# Patient Record
Sex: MALE | Race: BLACK OR AFRICAN AMERICAN | NOT HISPANIC OR LATINO | Employment: UNEMPLOYED | URBAN - METROPOLITAN AREA
[De-identification: names, ages, dates, MRNs, and addresses within clinical notes are randomized per-mention and may not be internally consistent; named-entity substitution may affect disease eponyms.]

---
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
## 2021-03-27 ENCOUNTER — IMMUNIZATION (OUTPATIENT)
Dept: PRIMARY CARE CLINIC | Facility: CLINIC | Age: 21
End: 2021-03-27
Payer: COMMERCIAL

## 2021-03-27 DIAGNOSIS — Z23 NEED FOR VACCINATION: Primary | ICD-10-CM

## 2021-03-27 PROCEDURE — 91300 COVID-19, MRNA, LNP-S, PF, 30 MCG/0.3 ML DOSE VACCINE: CPT | Mod: PBBFAC | Performed by: EMERGENCY MEDICINE

## 2021-04-17 ENCOUNTER — IMMUNIZATION (OUTPATIENT)
Dept: PRIMARY CARE CLINIC | Facility: CLINIC | Age: 21
End: 2021-04-17
Payer: COMMERCIAL

## 2021-04-17 DIAGNOSIS — Z23 NEED FOR VACCINATION: Primary | ICD-10-CM

## 2021-04-17 PROCEDURE — 91300 COVID-19, MRNA, LNP-S, PF, 30 MCG/0.3 ML DOSE VACCINE: CPT | Mod: S$GLB,,, | Performed by: FAMILY MEDICINE

## 2021-04-17 PROCEDURE — 91300 COVID-19, MRNA, LNP-S, PF, 30 MCG/0.3 ML DOSE VACCINE: ICD-10-PCS | Mod: S$GLB,,, | Performed by: FAMILY MEDICINE

## 2021-04-17 PROCEDURE — 0002A COVID-19, MRNA, LNP-S, PF, 30 MCG/0.3 ML DOSE VACCINE: CPT | Mod: CV19,S$GLB,, | Performed by: FAMILY MEDICINE

## 2021-04-17 PROCEDURE — 0002A COVID-19, MRNA, LNP-S, PF, 30 MCG/0.3 ML DOSE VACCINE: ICD-10-PCS | Mod: CV19,S$GLB,, | Performed by: FAMILY MEDICINE

## 2021-08-10 ENCOUNTER — OFFICE VISIT (OUTPATIENT)
Dept: OPTOMETRY | Facility: CLINIC | Age: 21
End: 2021-08-10
Payer: COMMERCIAL

## 2021-08-10 DIAGNOSIS — Z01.00 EXAMINATION OF EYES AND VISION: Primary | ICD-10-CM

## 2021-08-10 DIAGNOSIS — H52.222 REGULAR ASTIGMATISM OF LEFT EYE: ICD-10-CM

## 2021-08-10 DIAGNOSIS — Z13.5 SCREENING FOR EYE CONDITION: ICD-10-CM

## 2021-08-10 PROCEDURE — 99999 PR PBB SHADOW E&M-EST. PATIENT-LVL II: CPT | Mod: PBBFAC,,, | Performed by: OPTOMETRIST

## 2021-08-10 PROCEDURE — 1159F MED LIST DOCD IN RCRD: CPT | Mod: CPTII,S$GLB,, | Performed by: OPTOMETRIST

## 2021-08-10 PROCEDURE — 92015 DETERMINE REFRACTIVE STATE: CPT | Mod: S$GLB,,, | Performed by: OPTOMETRIST

## 2021-08-10 PROCEDURE — 92004 PR EYE EXAM, NEW PATIENT,COMPREHESV: ICD-10-PCS | Mod: S$GLB,,, | Performed by: OPTOMETRIST

## 2021-08-10 PROCEDURE — 99999 PR PBB SHADOW E&M-EST. PATIENT-LVL II: ICD-10-PCS | Mod: PBBFAC,,, | Performed by: OPTOMETRIST

## 2021-08-10 PROCEDURE — 92004 COMPRE OPH EXAM NEW PT 1/>: CPT | Mod: S$GLB,,, | Performed by: OPTOMETRIST

## 2021-08-10 PROCEDURE — 1159F PR MEDICATION LIST DOCUMENTED IN MEDICAL RECORD: ICD-10-PCS | Mod: CPTII,S$GLB,, | Performed by: OPTOMETRIST

## 2021-08-10 PROCEDURE — 92015 PR REFRACTION: ICD-10-PCS | Mod: S$GLB,,, | Performed by: OPTOMETRIST

## 2022-01-18 ENCOUNTER — PATIENT MESSAGE (OUTPATIENT)
Dept: ADMINISTRATIVE | Facility: HOSPITAL | Age: 22
End: 2022-01-18
Payer: COMMERCIAL

## 2022-05-31 ENCOUNTER — OFFICE VISIT (OUTPATIENT)
Dept: PRIMARY CARE CLINIC | Facility: CLINIC | Age: 22
End: 2022-05-31
Payer: COMMERCIAL

## 2022-05-31 VITALS
DIASTOLIC BLOOD PRESSURE: 70 MMHG | WEIGHT: 235.13 LBS | BODY MASS INDEX: 31.16 KG/M2 | TEMPERATURE: 98 F | HEART RATE: 74 BPM | SYSTOLIC BLOOD PRESSURE: 128 MMHG | RESPIRATION RATE: 18 BRPM | OXYGEN SATURATION: 99 % | HEIGHT: 73 IN

## 2022-05-31 DIAGNOSIS — M25.511 ACUTE PAIN OF RIGHT SHOULDER: Primary | ICD-10-CM

## 2022-05-31 PROCEDURE — 3078F DIAST BP <80 MM HG: CPT | Mod: CPTII,S$GLB,, | Performed by: NURSE PRACTITIONER

## 2022-05-31 PROCEDURE — 3078F PR MOST RECENT DIASTOLIC BLOOD PRESSURE < 80 MM HG: ICD-10-PCS | Mod: CPTII,S$GLB,, | Performed by: NURSE PRACTITIONER

## 2022-05-31 PROCEDURE — 1160F PR REVIEW ALL MEDS BY PRESCRIBER/CLIN PHARMACIST DOCUMENTED: ICD-10-PCS | Mod: CPTII,S$GLB,, | Performed by: NURSE PRACTITIONER

## 2022-05-31 PROCEDURE — 3008F PR BODY MASS INDEX (BMI) DOCUMENTED: ICD-10-PCS | Mod: CPTII,S$GLB,, | Performed by: NURSE PRACTITIONER

## 2022-05-31 PROCEDURE — 1160F RVW MEDS BY RX/DR IN RCRD: CPT | Mod: CPTII,S$GLB,, | Performed by: NURSE PRACTITIONER

## 2022-05-31 PROCEDURE — 1159F MED LIST DOCD IN RCRD: CPT | Mod: CPTII,S$GLB,, | Performed by: NURSE PRACTITIONER

## 2022-05-31 PROCEDURE — 3008F BODY MASS INDEX DOCD: CPT | Mod: CPTII,S$GLB,, | Performed by: NURSE PRACTITIONER

## 2022-05-31 PROCEDURE — 3074F SYST BP LT 130 MM HG: CPT | Mod: CPTII,S$GLB,, | Performed by: NURSE PRACTITIONER

## 2022-05-31 PROCEDURE — 99999 PR PBB SHADOW E&M-EST. PATIENT-LVL V: ICD-10-PCS | Mod: PBBFAC,,, | Performed by: NURSE PRACTITIONER

## 2022-05-31 PROCEDURE — 3074F PR MOST RECENT SYSTOLIC BLOOD PRESSURE < 130 MM HG: ICD-10-PCS | Mod: CPTII,S$GLB,, | Performed by: NURSE PRACTITIONER

## 2022-05-31 PROCEDURE — 99999 PR PBB SHADOW E&M-EST. PATIENT-LVL V: CPT | Mod: PBBFAC,,, | Performed by: NURSE PRACTITIONER

## 2022-05-31 PROCEDURE — 99213 OFFICE O/P EST LOW 20 MIN: CPT | Mod: S$GLB,,, | Performed by: NURSE PRACTITIONER

## 2022-05-31 PROCEDURE — 1159F PR MEDICATION LIST DOCUMENTED IN MEDICAL RECORD: ICD-10-PCS | Mod: CPTII,S$GLB,, | Performed by: NURSE PRACTITIONER

## 2022-05-31 PROCEDURE — 99213 PR OFFICE/OUTPT VISIT, EST, LEVL III, 20-29 MIN: ICD-10-PCS | Mod: S$GLB,,, | Performed by: NURSE PRACTITIONER

## 2022-05-31 NOTE — PROGRESS NOTES
"Disclaimer:  This note is prepared using voice recognition software and as such is likely to have errors and has not been proof read. Please contact me for questions.    Subjective:      Patient ID: Royal Martinez II is a 21 y.o. male.    Chief Complaint: Shoulder Pain    Right shoulder pain x 2 months ago after playing basketball. Arm got locked up with another player, pulled and felt like almost dislocated. Since then has similar sensation with certain movements. No pain at rest. No swelling, bruising, etc. Right hand dominant. NO past injuries of shoulder.      Review of Systems   Musculoskeletal: Positive for arthralgias (right shoulder).       Objective:   /70 (BP Location: Left arm, Patient Position: Sitting, BP Method: Large (Manual))   Pulse 74   Temp 98 °F (36.7 °C) (Tympanic)   Resp 18   Ht 6' 1" (1.854 m)   Wt 106.7 kg (235 lb 1.9 oz)   SpO2 99%   BMI 31.02 kg/m²   Physical Exam  Vitals reviewed.   Constitutional:       General: He is not in acute distress.     Appearance: Normal appearance. He is well-developed. He is not diaphoretic.   HENT:      Head: Normocephalic and atraumatic.      Right Ear: External ear normal.      Left Ear: External ear normal.      Nose: Nose normal.   Eyes:      General:         Right eye: No discharge.         Left eye: No discharge.      Conjunctiva/sclera: Conjunctivae normal.   Cardiovascular:      Rate and Rhythm: Normal rate.   Pulmonary:      Effort: Pulmonary effort is normal. No respiratory distress.   Musculoskeletal:         General: Tenderness (R lateral shoulder, no bony TTP or deformity, normal ROM but pain with abduction >90 degrees, no crepitus, no swelling, ecchymosis, or wounds; distal strength, sensation, and pulses are all normal) present. No swelling or deformity. Normal range of motion.      Cervical back: Normal range of motion and neck supple.   Skin:     General: Skin is warm and dry.      Findings: No rash.   Neurological:      General: No " focal deficit present.      Mental Status: He is alert and oriented to person, place, and time.   Psychiatric:         Behavior: Behavior normal.         Thought Content: Thought content normal.         Judgment: Judgment normal.       Assessment:     1. Acute pain of right shoulder       Plan:   Acute pain of right shoulder  -     Ambulatory referral/consult to Physical/Occupational Therapy; Future; Expected date: 06/07/2022  -     X-ray Shoulder 2 or More Views Right; Future; Expected date: 05/31/2022  -     Ambulatory referral/consult to Orthopedics; Future; Expected date: 06/07/2022    Rest, ice PRN, NSAIDs PRN, and ortho/physical therapy follow up. X-ray pending. Will review when results are available. Mr. Martinez verbalizes understanding of home care, follow up, and red flag symptoms that warrant immediate re-evaluation. He agrees to plan.      Follow up if symptoms worsen or fail to improve.    There are no Patient Instructions on file for this visit.

## 2022-07-11 ENCOUNTER — OFFICE VISIT (OUTPATIENT)
Dept: INTERNAL MEDICINE | Facility: CLINIC | Age: 22
End: 2022-07-11
Payer: COMMERCIAL

## 2022-07-11 ENCOUNTER — LAB VISIT (OUTPATIENT)
Dept: LAB | Facility: HOSPITAL | Age: 22
End: 2022-07-11
Attending: FAMILY MEDICINE
Payer: COMMERCIAL

## 2022-07-11 VITALS
WEIGHT: 234.38 LBS | BODY MASS INDEX: 30.08 KG/M2 | HEIGHT: 74 IN | DIASTOLIC BLOOD PRESSURE: 80 MMHG | OXYGEN SATURATION: 98 % | SYSTOLIC BLOOD PRESSURE: 148 MMHG | HEART RATE: 98 BPM | RESPIRATION RATE: 18 BRPM

## 2022-07-11 DIAGNOSIS — Z11.59 ENCOUNTER FOR HEPATITIS C SCREENING TEST FOR LOW RISK PATIENT: ICD-10-CM

## 2022-07-11 DIAGNOSIS — Z00.00 ROUTINE PHYSICAL EXAMINATION: Primary | ICD-10-CM

## 2022-07-11 DIAGNOSIS — Z11.4 ENCOUNTER FOR SCREENING FOR HIV: ICD-10-CM

## 2022-07-11 DIAGNOSIS — Z00.00 ROUTINE PHYSICAL EXAMINATION: ICD-10-CM

## 2022-07-11 LAB
ALBUMIN SERPL BCP-MCNC: 4.4 G/DL (ref 3.5–5.2)
ALP SERPL-CCNC: 73 U/L (ref 55–135)
ALT SERPL W/O P-5'-P-CCNC: 30 U/L (ref 10–44)
ANION GAP SERPL CALC-SCNC: 9 MMOL/L (ref 8–16)
AST SERPL-CCNC: 19 U/L (ref 10–40)
BILIRUB SERPL-MCNC: 0.4 MG/DL (ref 0.1–1)
BUN SERPL-MCNC: 19 MG/DL (ref 6–20)
CALCIUM SERPL-MCNC: 9.8 MG/DL (ref 8.7–10.5)
CHLORIDE SERPL-SCNC: 104 MMOL/L (ref 95–110)
CO2 SERPL-SCNC: 27 MMOL/L (ref 23–29)
CREAT SERPL-MCNC: 1.2 MG/DL (ref 0.5–1.4)
EST. GFR  (AFRICAN AMERICAN): >60 ML/MIN/1.73 M^2
EST. GFR  (NON AFRICAN AMERICAN): >60 ML/MIN/1.73 M^2
GLUCOSE SERPL-MCNC: 90 MG/DL (ref 70–110)
POTASSIUM SERPL-SCNC: 5 MMOL/L (ref 3.5–5.1)
PROT SERPL-MCNC: 7.5 G/DL (ref 6–8.4)
SODIUM SERPL-SCNC: 140 MMOL/L (ref 136–145)

## 2022-07-11 PROCEDURE — 3077F SYST BP >= 140 MM HG: CPT | Mod: CPTII,S$GLB,, | Performed by: FAMILY MEDICINE

## 2022-07-11 PROCEDURE — 1159F PR MEDICATION LIST DOCUMENTED IN MEDICAL RECORD: ICD-10-PCS | Mod: CPTII,S$GLB,, | Performed by: FAMILY MEDICINE

## 2022-07-11 PROCEDURE — 80053 COMPREHEN METABOLIC PANEL: CPT | Performed by: FAMILY MEDICINE

## 2022-07-11 PROCEDURE — 85025 COMPLETE CBC W/AUTO DIFF WBC: CPT | Performed by: FAMILY MEDICINE

## 2022-07-11 PROCEDURE — 99395 PR PREVENTIVE VISIT,EST,18-39: ICD-10-PCS | Mod: S$GLB,,, | Performed by: FAMILY MEDICINE

## 2022-07-11 PROCEDURE — 87389 HIV-1 AG W/HIV-1&-2 AB AG IA: CPT | Performed by: FAMILY MEDICINE

## 2022-07-11 PROCEDURE — 99395 PREV VISIT EST AGE 18-39: CPT | Mod: S$GLB,,, | Performed by: FAMILY MEDICINE

## 2022-07-11 PROCEDURE — 3077F PR MOST RECENT SYSTOLIC BLOOD PRESSURE >= 140 MM HG: ICD-10-PCS | Mod: CPTII,S$GLB,, | Performed by: FAMILY MEDICINE

## 2022-07-11 PROCEDURE — 3079F PR MOST RECENT DIASTOLIC BLOOD PRESSURE 80-89 MM HG: ICD-10-PCS | Mod: CPTII,S$GLB,, | Performed by: FAMILY MEDICINE

## 2022-07-11 PROCEDURE — 99999 PR PBB SHADOW E&M-EST. PATIENT-LVL III: ICD-10-PCS | Mod: PBBFAC,,, | Performed by: FAMILY MEDICINE

## 2022-07-11 PROCEDURE — 1159F MED LIST DOCD IN RCRD: CPT | Mod: CPTII,S$GLB,, | Performed by: FAMILY MEDICINE

## 2022-07-11 PROCEDURE — 3079F DIAST BP 80-89 MM HG: CPT | Mod: CPTII,S$GLB,, | Performed by: FAMILY MEDICINE

## 2022-07-11 PROCEDURE — 36415 COLL VENOUS BLD VENIPUNCTURE: CPT | Performed by: FAMILY MEDICINE

## 2022-07-11 PROCEDURE — 3008F BODY MASS INDEX DOCD: CPT | Mod: CPTII,S$GLB,, | Performed by: FAMILY MEDICINE

## 2022-07-11 PROCEDURE — 86803 HEPATITIS C AB TEST: CPT | Performed by: FAMILY MEDICINE

## 2022-07-11 PROCEDURE — 3008F PR BODY MASS INDEX (BMI) DOCUMENTED: ICD-10-PCS | Mod: CPTII,S$GLB,, | Performed by: FAMILY MEDICINE

## 2022-07-11 PROCEDURE — 99999 PR PBB SHADOW E&M-EST. PATIENT-LVL III: CPT | Mod: PBBFAC,,, | Performed by: FAMILY MEDICINE

## 2022-07-11 NOTE — PROGRESS NOTES
Royal Martinez   07/12/2022  [id number]    Lety Grande MD  Patient Care Team:  Lety Grande MD as PCP - General (Internal Medicine)  Deisi Hyde MA as Care Coordinator    Has the patient seen any provider outside of the network since the last visit ? (no). If yes, HIPPA forms completed and records requested.      Visit Type:a scheduled routine follow-up visit    Chief Complaint:  Chief Complaint   Patient presents with    Annual Exam       History of Present Illness:  HPI  Mr. Martinez presents today for his annual exam.   Reviewed medical, social, surgical and family history    Elevated BP today patient states this has never been the case.   Will continue healthy lifestyle repeat in 1week      Review of Systems   Constitutional: Negative for chills and fever.   HENT: Negative for congestion and tinnitus.    Eyes: Negative for blurred vision, pain and discharge.   Respiratory: Negative for cough and wheezing.    Cardiovascular: Negative for chest pain, palpitations, orthopnea and leg swelling.   Gastrointestinal: Negative for abdominal pain, blood in stool, constipation, diarrhea, heartburn, nausea and vomiting.   Genitourinary: Negative for dysuria, flank pain, frequency, hematuria and urgency.   Skin: Negative for itching and rash.   Neurological: Negative for dizziness, tingling and headaches.   Psychiatric/Behavioral: Negative for depression.         Screening Questionnaires:    In the last two weeks how often have you felt down, depressed, or hopeless ( no )    In the last two weeks how often have you had little interest or pleasure in doing  (no )    In the last two weeks how often have you been bothered by the following problems:  1. Feeling nervous, anxious, or on edge ( no )    2. Not being able to stop or control worrying ( no)    3. Worrying too much about different things ( no)    4. Trouble relaxing ( no )    5. Being so restless that it is hard to sit still  (no )    6. Becoming easily  annoyed or irritable (no)    7. Feeling afraid as if something awful might happen (no )    How often do you have a drink containing Alcohol? denied     Do you exercise  (yes ) very active    Do you take a baby Aspirin daily ( no)    Do you have an advance directive ( no ) The patient was given information regarding Living Will/Durable Power-of- if requested.     The following were reviewed: Active problem list, medication list, allergies, family history, social history, and Health Maintenance.     History:  Past Medical History:   Diagnosis Date    ALLERGIC RHINITIS     Allergic rhinitis 2020    Asthma, currently inactive     Childhood asthma 2020    Obesity     Overweight(278.02)     Patellar tendonitis of left knee      Past Surgical History:   Procedure Laterality Date    CIRCUMCISION       Family History   Problem Relation Age of Onset    Alcohol abuse Paternal Grandfather         now     Hypertension Father     Obesity Father     Hypertension Maternal Grandfather     Hypertension Mother     Breast cancer Mother 40    Diabetes Unknown         maternal side    Acne Maternal Aunt     Psoriasis Cousin     Diabetes Maternal Uncle     Depression Neg Hx     Suicidality Neg Hx     Eczema Neg Hx     Melanoma Neg Hx     Lupus Neg Hx      Social History     Socioeconomic History    Marital status: Single   Occupational History    Occupation: Student   Tobacco Use    Smoking status: Never Smoker    Smokeless tobacco: Never Used   Substance and Sexual Activity    Alcohol use: No    Drug use: Never    Sexual activity: Not Currently   Social History Narrative    Lives with parents and sib, no pets.    11th grade- [employer].                                  Patient Active Problem List   Diagnosis    Body mass index (BMI) greater than or equal to 95th percentile for age in pediatric patient    Acanthosis nigricans    Regular astigmatism of both eyes     Allergic rhinitis    Childhood asthma     Review of patient's allergies indicates:  No Known Allergies    Health Maintenance  Health Maintenance Topics with due status: Not Due       Topic Last Completion Date    Influenza Vaccine 11/23/2020     Health Maintenance Due   Topic Date Due    TETANUS VACCINE  07/12/2022       Medications:  Current Outpatient Medications on File Prior to Visit   Medication Sig Dispense Refill    cetirizine (ZYRTEC) 10 mg chewable tablet Take 10 mg by mouth once daily. (Patient not taking: No sig reported) 30 tablet 2     No current facility-administered medications on file prior to visit.       Medications have been reviewed and reconciled with patient at visit today.    Barriers to medications present (no )    Adverse reactions to current medications (no)    Over the counter medications reviewed (Yes) and if needed added to active Medication list.    Exam:  Vitals:    07/11/22 1337   BP: (!) 148/80   Pulse:    Resp:      Weight: 106.3 kg (234 lb 5.6 oz)   Body mass index is 30.09 kg/m².      Physical Exam  Vitals and nursing note reviewed.   Constitutional:       Appearance: He is not ill-appearing.   HENT:      Head: Normocephalic and atraumatic.      Right Ear: External ear normal.      Left Ear: External ear normal.   Eyes:      General: No scleral icterus.        Right eye: No discharge.         Left eye: No discharge.      Pupils: Pupils are equal, round, and reactive to light.   Neck:      Thyroid: No thyromegaly.   Cardiovascular:      Rate and Rhythm: Normal rate and regular rhythm.      Heart sounds: Normal heart sounds. No murmur heard.  Pulmonary:      Effort: Pulmonary effort is normal. No respiratory distress.      Breath sounds: Normal breath sounds. No wheezing.   Abdominal:      General: Bowel sounds are normal. There is no distension.      Palpations: Abdomen is soft.      Tenderness: There is no abdominal tenderness.   Musculoskeletal:         General: Normal  range of motion.      Cervical back: Normal range of motion and neck supple.   Skin:     General: Skin is warm.      Findings: No erythema or rash.   Neurological:      Mental Status: He is alert and oriented to person, place, and time.      Coordination: Coordination normal.      Deep Tendon Reflexes: Reflexes are normal and symmetric.   Psychiatric:         Behavior: Behavior normal.         Laboratory Reviewed: (Yes)  Lab Results   Component Value Date    WBC 7.33 07/11/2022    HGB 14.4 07/11/2022    HCT 45.6 07/11/2022     07/11/2022    CHOL 138 11/24/2020    TRIG 25 (L) 11/24/2020    HDL 67 11/24/2020    ALT 30 07/11/2022    AST 19 07/11/2022     07/11/2022    K 5.0 07/11/2022     07/11/2022    CREATININE 1.2 07/11/2022    BUN 19 07/11/2022    CO2 27 07/11/2022    TSH 1.992 11/24/2020    GLUF 91 10/26/2012    HGBA1C 5.2 09/19/2016       Assessment:  The primary encounter diagnosis was Routine physical examination. Diagnoses of Encounter for hepatitis C screening test for low risk patient and Encounter for screening for HIV were also pertinent to this visit.    Plan:  Routine physical examination  -     Comprehensive Metabolic Panel; Future; Expected date: 07/11/2022  -     CBC Auto Differential; Future; Expected date: 07/11/2022    Encounter for hepatitis C screening test for low risk patient  -     Hepatitis C Antibody; Future; Expected date: 07/11/2022    Encounter for screening for HIV  -     HIV 1/2 Ag/Ab (4th Gen); Future; Expected date: 07/11/2022      -Patient's lab results were reviewed and discussed with patient  -Treatment options and alternatives were discussed with the patient. Patient expressed understanding. Patient was given the opportunity to ask questions and be an active participant in their medical care. Patient had no further questions or concerns at this time.   -Documentation of patient's health and condition was obtained from family member who was present during  visit.  -Patient is an overall moderate risk for health complications from their medical conditions.     Follow up: follow up 1 week for BP check   1 year for annual       Care Plan/Goals: Reviewed No   Goals      30 Mins activity 4x/week       Healthy Plate at Meals                After visit summary printed and given to patient upon discharge.  Patient goals and care plan are included in After visit summary.

## 2022-07-12 LAB
BASOPHILS # BLD AUTO: 0.07 K/UL (ref 0–0.2)
BASOPHILS NFR BLD: 1 % (ref 0–1.9)
DIFFERENTIAL METHOD: ABNORMAL
EOSINOPHIL # BLD AUTO: 0.3 K/UL (ref 0–0.5)
EOSINOPHIL NFR BLD: 3.8 % (ref 0–8)
ERYTHROCYTE [DISTWIDTH] IN BLOOD BY AUTOMATED COUNT: 13.2 % (ref 11.5–14.5)
HCT VFR BLD AUTO: 45.6 % (ref 40–54)
HCV AB SERPL QL IA: NEGATIVE
HGB BLD-MCNC: 14.4 G/DL (ref 14–18)
HIV 1+2 AB+HIV1 P24 AG SERPL QL IA: NEGATIVE
IMM GRANULOCYTES # BLD AUTO: 0.01 K/UL (ref 0–0.04)
IMM GRANULOCYTES NFR BLD AUTO: 0.1 % (ref 0–0.5)
LYMPHOCYTES # BLD AUTO: 2 K/UL (ref 1–4.8)
LYMPHOCYTES NFR BLD: 27.1 % (ref 18–48)
MCH RBC QN AUTO: 30 PG (ref 27–31)
MCHC RBC AUTO-ENTMCNC: 31.6 G/DL (ref 32–36)
MCV RBC AUTO: 95 FL (ref 82–98)
MONOCYTES # BLD AUTO: 0.6 K/UL (ref 0.3–1)
MONOCYTES NFR BLD: 8.7 % (ref 4–15)
NEUTROPHILS # BLD AUTO: 4.3 K/UL (ref 1.8–7.7)
NEUTROPHILS NFR BLD: 59.3 % (ref 38–73)
NRBC BLD-RTO: 0 /100 WBC
PLATELET # BLD AUTO: 197 K/UL (ref 150–450)
PMV BLD AUTO: 11.5 FL (ref 9.2–12.9)
RBC # BLD AUTO: 4.8 M/UL (ref 4.6–6.2)
WBC # BLD AUTO: 7.33 K/UL (ref 3.9–12.7)

## 2022-07-18 ENCOUNTER — CLINICAL SUPPORT (OUTPATIENT)
Dept: INTERNAL MEDICINE | Facility: CLINIC | Age: 22
End: 2022-07-18
Payer: COMMERCIAL

## 2022-07-18 VITALS — DIASTOLIC BLOOD PRESSURE: 62 MMHG | SYSTOLIC BLOOD PRESSURE: 154 MMHG | HEART RATE: 106 BPM

## 2022-07-18 DIAGNOSIS — I10 HYPERTENSION, UNSPECIFIED TYPE: Primary | ICD-10-CM

## 2022-07-18 PROCEDURE — 99999 PR PBB SHADOW E&M-EST. PATIENT-LVL II: CPT | Mod: PBBFAC,,,

## 2022-07-18 PROCEDURE — 99999 PR PBB SHADOW E&M-EST. PATIENT-LVL II: ICD-10-PCS | Mod: PBBFAC,,,

## 2022-07-18 NOTE — PROGRESS NOTES
Pt here for bp check  bp 154/62 pulse 106  MD notified   Recommendation to send him to cardiology   appt made  And pt notified

## 2022-07-27 ENCOUNTER — OFFICE VISIT (OUTPATIENT)
Dept: CARDIOLOGY | Facility: CLINIC | Age: 22
End: 2022-07-27
Payer: COMMERCIAL

## 2022-07-27 ENCOUNTER — PATIENT MESSAGE (OUTPATIENT)
Dept: CARDIOLOGY | Facility: CLINIC | Age: 22
End: 2022-07-27

## 2022-07-27 VITALS
SYSTOLIC BLOOD PRESSURE: 138 MMHG | HEIGHT: 74 IN | DIASTOLIC BLOOD PRESSURE: 80 MMHG | HEART RATE: 92 BPM | BODY MASS INDEX: 30.21 KG/M2 | WEIGHT: 235.44 LBS

## 2022-07-27 DIAGNOSIS — R03.0 ELEVATED BLOOD PRESSURE READING WITHOUT DIAGNOSIS OF HYPERTENSION: ICD-10-CM

## 2022-07-27 PROCEDURE — 99203 PR OFFICE/OUTPT VISIT, NEW, LEVL III, 30-44 MIN: ICD-10-PCS | Mod: S$GLB,,, | Performed by: INTERNAL MEDICINE

## 2022-07-27 PROCEDURE — 1159F PR MEDICATION LIST DOCUMENTED IN MEDICAL RECORD: ICD-10-PCS | Mod: CPTII,S$GLB,, | Performed by: INTERNAL MEDICINE

## 2022-07-27 PROCEDURE — 3079F DIAST BP 80-89 MM HG: CPT | Mod: CPTII,S$GLB,, | Performed by: INTERNAL MEDICINE

## 2022-07-27 PROCEDURE — 3079F PR MOST RECENT DIASTOLIC BLOOD PRESSURE 80-89 MM HG: ICD-10-PCS | Mod: CPTII,S$GLB,, | Performed by: INTERNAL MEDICINE

## 2022-07-27 PROCEDURE — 99999 PR PBB SHADOW E&M-EST. PATIENT-LVL III: ICD-10-PCS | Mod: PBBFAC,,, | Performed by: INTERNAL MEDICINE

## 2022-07-27 PROCEDURE — 99999 PR PBB SHADOW E&M-EST. PATIENT-LVL III: CPT | Mod: PBBFAC,,, | Performed by: INTERNAL MEDICINE

## 2022-07-27 PROCEDURE — 3075F SYST BP GE 130 - 139MM HG: CPT | Mod: CPTII,S$GLB,, | Performed by: INTERNAL MEDICINE

## 2022-07-27 PROCEDURE — 3008F BODY MASS INDEX DOCD: CPT | Mod: CPTII,S$GLB,, | Performed by: INTERNAL MEDICINE

## 2022-07-27 PROCEDURE — 1159F MED LIST DOCD IN RCRD: CPT | Mod: CPTII,S$GLB,, | Performed by: INTERNAL MEDICINE

## 2022-07-27 PROCEDURE — 3075F PR MOST RECENT SYSTOLIC BLOOD PRESS GE 130-139MM HG: ICD-10-PCS | Mod: CPTII,S$GLB,, | Performed by: INTERNAL MEDICINE

## 2022-07-27 PROCEDURE — 1160F PR REVIEW ALL MEDS BY PRESCRIBER/CLIN PHARMACIST DOCUMENTED: ICD-10-PCS | Mod: CPTII,S$GLB,, | Performed by: INTERNAL MEDICINE

## 2022-07-27 PROCEDURE — 1160F RVW MEDS BY RX/DR IN RCRD: CPT | Mod: CPTII,S$GLB,, | Performed by: INTERNAL MEDICINE

## 2022-07-27 PROCEDURE — 99203 OFFICE O/P NEW LOW 30 MIN: CPT | Mod: S$GLB,,, | Performed by: INTERNAL MEDICINE

## 2022-07-27 PROCEDURE — 3008F PR BODY MASS INDEX (BMI) DOCUMENTED: ICD-10-PCS | Mod: CPTII,S$GLB,, | Performed by: INTERNAL MEDICINE

## 2022-07-27 NOTE — PROGRESS NOTES
Subjective:   Patient ID:  Royal Martinez II is a 22 y.o. male who presents for evaluation of elevated blood pressure without mention of hypertension      HPI: Very pleasant young man with no significant medical history or family history of cardiovascular disease presents because of a reading of 160mm Hg systolic found on a routine wellness visit at school.  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    He is doing well with no new symptoms or cardiovascular complaints and no change in exercise capacity.  He denies chest discomfort, VOGEL, palpitations, PND/orthopnea, lightheadedness and syncope.    He works out virtually every day and eats healthfully.  He's been keeping track of his BPs at home on an accurate cuff and every reading is under 125mm Hg systolic.    Past Medical History:   Diagnosis Date    ALLERGIC RHINITIS     Allergic rhinitis 2020    Asthma, currently inactive     Childhood asthma 2020    Obesity     Overweight(278.02)     Patellar tendonitis of left knee        Past Surgical History:   Procedure Laterality Date    CIRCUMCISION         Social History     Tobacco Use    Smoking status: Never Smoker    Smokeless tobacco: Never Used   Substance Use Topics    Alcohol use: No    Drug use: Never       Family History   Problem Relation Age of Onset    Alcohol abuse Paternal Grandfather         now     Hypertension Father     Obesity Father     Hypertension Maternal Grandfather     Hypertension Mother     Breast cancer Mother 40    Diabetes Unknown         maternal side    Acne Maternal Aunt     Psoriasis Cousin     Diabetes Maternal Uncle     Depression Neg Hx     Suicidality Neg Hx     Eczema Neg Hx     Melanoma Neg Hx     Lupus Neg Hx        No current outpatient medications on file.     No current facility-administered medications for this visit.       Review of patient's allergies indicates:  No  Known Allergies    Review of Systems   Constitutional: Negative.   HENT: Negative.    Eyes: Negative.    Cardiovascular: Negative.  Negative for chest pain, dyspnea on exertion, near-syncope, orthopnea and palpitations.   Respiratory: Negative.  Negative for cough and shortness of breath.    Endocrine: Negative.    Hematologic/Lymphatic: Negative.    Skin: Negative.    Musculoskeletal: Negative.    Gastrointestinal: Negative.    Genitourinary: Negative.    Neurological: Negative.    Psychiatric/Behavioral: Negative.      Objective:   Physical Exam  Vitals reviewed.   Constitutional:       Appearance: He is well-developed.   HENT:      Head: Normocephalic and atraumatic.   Eyes:      General: No scleral icterus.     Conjunctiva/sclera: Conjunctivae normal.   Neck:      Vascular: No JVD.   Cardiovascular:      Rate and Rhythm: Normal rate and regular rhythm.      Pulses: Intact distal pulses.      Heart sounds: Normal heart sounds. No murmur heard.    No friction rub. No gallop.   Pulmonary:      Effort: Pulmonary effort is normal.      Breath sounds: Normal breath sounds. No wheezing or rales.   Abdominal:      General: Bowel sounds are normal. There is no distension.      Palpations: Abdomen is soft.      Tenderness: There is no abdominal tenderness.   Musculoskeletal:         General: Normal range of motion.      Cervical back: Normal range of motion and neck supple.   Skin:     General: Skin is warm and dry.      Findings: No erythema or rash.   Neurological:      Mental Status: He is alert and oriented to person, place, and time.   Psychiatric:         Behavior: Behavior normal.         Thought Content: Thought content normal.         Judgment: Judgment normal.         Lab Results   Component Value Date    WBC 7.33 07/11/2022    HGB 14.4 07/11/2022    HCT 45.6 07/11/2022    MCV 95 07/11/2022     07/11/2022         Chemistry        Component Value Date/Time     07/11/2022 1350    K 5.0 07/11/2022 1350      07/11/2022 1350    CO2 27 07/11/2022 1350    BUN 19 07/11/2022 1350    CREATININE 1.2 07/11/2022 1350    GLU 90 07/11/2022 1350        Component Value Date/Time    CALCIUM 9.8 07/11/2022 1350    ALKPHOS 73 07/11/2022 1350    AST 19 07/11/2022 1350    ALT 30 07/11/2022 1350    BILITOT 0.4 07/11/2022 1350    ESTGFRAFRICA >60.0 07/11/2022 1350    EGFRNONAA >60.0 07/11/2022 1350            Lab Results   Component Value Date    CHOL 138 11/24/2020    CHOL 120 09/19/2016    CHOL 131 10/26/2012     Lab Results   Component Value Date    HDL 67 11/24/2020    HDL 40 09/19/2016    HDL 40 09/19/2016     Lab Results   Component Value Date    LDLCALC 66.0 11/24/2020    LDLCALC 65.6 09/19/2016    LDLCALC 69.0 10/26/2012     Lab Results   Component Value Date    TRIG 25 (L) 11/24/2020    TRIG 72 09/19/2016    TRIG 65 10/26/2012     Lab Results   Component Value Date    CHOLHDL 48.6 11/24/2020    CHOLHDL 33.3 09/19/2016    CHOLHDL 37.4 10/26/2012       Lab Results   Component Value Date    TSH 1.992 11/24/2020       Lab Results   Component Value Date    HGBA1C 5.2 09/19/2016         Assessment:     1. Elevated blood pressure reading without diagnosis of hypertension        Plan:     Reassurance    Continue current medicines.    Diet/exercise goals reinforced.      F/U PRN

## 2022-09-24 ENCOUNTER — IMMUNIZATION (OUTPATIENT)
Dept: INTERNAL MEDICINE | Facility: CLINIC | Age: 22
End: 2022-09-24
Payer: COMMERCIAL

## 2022-09-24 PROCEDURE — 90686 FLU VACCINE (QUAD) GREATER THAN OR EQUAL TO 3YO PRESERVATIVE FREE IM: ICD-10-PCS | Mod: S$GLB,,, | Performed by: FAMILY MEDICINE

## 2022-09-24 PROCEDURE — 90471 FLU VACCINE (QUAD) GREATER THAN OR EQUAL TO 3YO PRESERVATIVE FREE IM: ICD-10-PCS | Mod: S$GLB,,, | Performed by: FAMILY MEDICINE

## 2022-09-24 PROCEDURE — 90686 IIV4 VACC NO PRSV 0.5 ML IM: CPT | Mod: S$GLB,,, | Performed by: FAMILY MEDICINE

## 2022-09-24 PROCEDURE — 90471 IMMUNIZATION ADMIN: CPT | Mod: S$GLB,,, | Performed by: FAMILY MEDICINE

## 2022-11-22 ENCOUNTER — OFFICE VISIT (OUTPATIENT)
Dept: INTERNAL MEDICINE | Facility: CLINIC | Age: 22
End: 2022-11-22
Payer: COMMERCIAL

## 2022-11-22 VITALS
OXYGEN SATURATION: 100 % | HEIGHT: 74 IN | HEART RATE: 90 BPM | SYSTOLIC BLOOD PRESSURE: 138 MMHG | TEMPERATURE: 97 F | BODY MASS INDEX: 31.01 KG/M2 | DIASTOLIC BLOOD PRESSURE: 60 MMHG | WEIGHT: 241.63 LBS

## 2022-11-22 DIAGNOSIS — J06.9 VIRAL URI WITH COUGH: ICD-10-CM

## 2022-11-22 DIAGNOSIS — R05.9 COUGH, UNSPECIFIED TYPE: Primary | ICD-10-CM

## 2022-11-22 PROCEDURE — 99999 PR PBB SHADOW E&M-EST. PATIENT-LVL IV: CPT | Mod: PBBFAC,,, | Performed by: PHYSICIAN ASSISTANT

## 2022-11-22 PROCEDURE — 3078F PR MOST RECENT DIASTOLIC BLOOD PRESSURE < 80 MM HG: ICD-10-PCS | Mod: CPTII,S$GLB,, | Performed by: PHYSICIAN ASSISTANT

## 2022-11-22 PROCEDURE — 1160F RVW MEDS BY RX/DR IN RCRD: CPT | Mod: CPTII,S$GLB,, | Performed by: PHYSICIAN ASSISTANT

## 2022-11-22 PROCEDURE — 99214 PR OFFICE/OUTPT VISIT, EST, LEVL IV, 30-39 MIN: ICD-10-PCS | Mod: S$GLB,,, | Performed by: PHYSICIAN ASSISTANT

## 2022-11-22 PROCEDURE — 1160F PR REVIEW ALL MEDS BY PRESCRIBER/CLIN PHARMACIST DOCUMENTED: ICD-10-PCS | Mod: CPTII,S$GLB,, | Performed by: PHYSICIAN ASSISTANT

## 2022-11-22 PROCEDURE — 99214 OFFICE O/P EST MOD 30 MIN: CPT | Mod: S$GLB,,, | Performed by: PHYSICIAN ASSISTANT

## 2022-11-22 PROCEDURE — 1159F PR MEDICATION LIST DOCUMENTED IN MEDICAL RECORD: ICD-10-PCS | Mod: CPTII,S$GLB,, | Performed by: PHYSICIAN ASSISTANT

## 2022-11-22 PROCEDURE — 99999 PR PBB SHADOW E&M-EST. PATIENT-LVL IV: ICD-10-PCS | Mod: PBBFAC,,, | Performed by: PHYSICIAN ASSISTANT

## 2022-11-22 PROCEDURE — 3075F SYST BP GE 130 - 139MM HG: CPT | Mod: CPTII,S$GLB,, | Performed by: PHYSICIAN ASSISTANT

## 2022-11-22 PROCEDURE — 3075F PR MOST RECENT SYSTOLIC BLOOD PRESS GE 130-139MM HG: ICD-10-PCS | Mod: CPTII,S$GLB,, | Performed by: PHYSICIAN ASSISTANT

## 2022-11-22 PROCEDURE — 3078F DIAST BP <80 MM HG: CPT | Mod: CPTII,S$GLB,, | Performed by: PHYSICIAN ASSISTANT

## 2022-11-22 PROCEDURE — 1159F MED LIST DOCD IN RCRD: CPT | Mod: CPTII,S$GLB,, | Performed by: PHYSICIAN ASSISTANT

## 2022-11-22 PROCEDURE — 3008F BODY MASS INDEX DOCD: CPT | Mod: CPTII,S$GLB,, | Performed by: PHYSICIAN ASSISTANT

## 2022-11-22 PROCEDURE — 3008F PR BODY MASS INDEX (BMI) DOCUMENTED: ICD-10-PCS | Mod: CPTII,S$GLB,, | Performed by: PHYSICIAN ASSISTANT

## 2022-11-22 RX ORDER — PROMETHAZINE HYDROCHLORIDE AND DEXTROMETHORPHAN HYDROBROMIDE 6.25; 15 MG/5ML; MG/5ML
5 SYRUP ORAL EVERY 6 HOURS PRN
Qty: 118 ML | Refills: 0 | Status: SHIPPED | OUTPATIENT
Start: 2022-11-22 | End: 2022-11-29

## 2022-11-22 RX ORDER — BENZONATATE 200 MG/1
200 CAPSULE ORAL 3 TIMES DAILY PRN
Qty: 30 CAPSULE | Refills: 0 | Status: SHIPPED | OUTPATIENT
Start: 2022-11-22 | End: 2022-12-02

## 2022-11-22 NOTE — PROGRESS NOTES
Subjective:      Patient ID: Royal Martinez II is a 22 y.o. male.    Chief Complaint: Cough    Cough  This is a new problem. The current episode started today. The cough is Productive of sputum. Associated symptoms include nasal congestion, postnasal drip and rhinorrhea. Pertinent negatives include no chest pain, chills, ear congestion, ear pain, fever, headaches, heartburn, hemoptysis, myalgias, rash, sore throat, shortness of breath, sweats, weight loss or wheezing. He has tried nothing for the symptoms. His past medical history is significant for asthma. There is no history of bronchiectasis, bronchitis, COPD, emphysema, environmental allergies or pneumonia.     Patient Active Problem List   Diagnosis    Body mass index (BMI) greater than or equal to 95th percentile for age in pediatric patient    Acanthosis nigricans    Regular astigmatism of both eyes    Allergic rhinitis    Childhood asthma    Elevated blood pressure reading without diagnosis of hypertension         Current Outpatient Medications:     benzonatate (TESSALON) 200 MG capsule, Take 1 capsule (200 mg total) by mouth 3 (three) times daily as needed for Cough., Disp: 30 capsule, Rfl: 0    promethazine-dextromethorphan (PROMETHAZINE-DM) 6.25-15 mg/5 mL Syrp, Take 5 mLs by mouth every 6 (six) hours as needed (cough)., Disp: 118 mL, Rfl: 0    Review of Systems   Constitutional:  Negative for activity change, appetite change, chills, diaphoresis, fatigue, fever, unexpected weight change and weight loss.   HENT:  Positive for postnasal drip and rhinorrhea. Negative for congestion, ear pain, hearing loss, sore throat, trouble swallowing and voice change.    Eyes: Negative.  Negative for visual disturbance.   Respiratory:  Positive for cough. Negative for hemoptysis, choking, chest tightness, shortness of breath and wheezing.    Cardiovascular:  Negative for chest pain, palpitations and leg swelling.   Gastrointestinal:  Negative for abdominal  "distention, abdominal pain, blood in stool, constipation, diarrhea, heartburn, nausea and vomiting.   Endocrine: Negative for cold intolerance, heat intolerance, polydipsia and polyuria.   Genitourinary: Negative.  Negative for difficulty urinating and frequency.   Musculoskeletal:  Negative for arthralgias, back pain, gait problem, joint swelling and myalgias.   Skin:  Negative for color change, pallor, rash and wound.   Allergic/Immunologic: Negative for environmental allergies.   Neurological:  Negative for dizziness, tremors, weakness, light-headedness, numbness and headaches.   Hematological:  Negative for adenopathy.   Psychiatric/Behavioral:  Negative for behavioral problems, confusion, self-injury, sleep disturbance and suicidal ideas. The patient is not nervous/anxious.    Objective:   /60 (BP Location: Right arm, Patient Position: Sitting, BP Method: Large (Manual))   Pulse 90   Temp 97.2 °F (36.2 °C) (Tympanic)   Ht 6' 2" (1.88 m)   Wt 109.6 kg (241 lb 10 oz)   SpO2 100%   BMI 31.02 kg/m²     Physical Exam  Vitals and nursing note reviewed.   Constitutional:       General: He is not in acute distress.     Appearance: Normal appearance. He is well-developed. He is not ill-appearing, toxic-appearing or diaphoretic.   HENT:      Head: Normocephalic and atraumatic.      Right Ear: Hearing, tympanic membrane, ear canal and external ear normal. No tenderness. There is no impacted cerumen.      Left Ear: Hearing, tympanic membrane, ear canal and external ear normal. No tenderness. There is no impacted cerumen.      Nose: Mucosal edema, congestion and rhinorrhea present.      Right Sinus: Maxillary sinus tenderness and frontal sinus tenderness present.      Left Sinus: Maxillary sinus tenderness and frontal sinus tenderness present.      Mouth/Throat:      Mouth: Mucous membranes are moist. No oral lesions.      Dentition: Normal dentition. No dental caries or dental abscesses.      Tongue: No " lesions.      Palate: No lesions.      Pharynx: Uvula midline. No pharyngeal swelling, oropharyngeal exudate, posterior oropharyngeal erythema or uvula swelling.      Tonsils: No tonsillar exudate or tonsillar abscesses.   Eyes:      General:         Right eye: No discharge.         Left eye: No discharge.      Extraocular Movements: Extraocular movements intact.      Conjunctiva/sclera: Conjunctivae normal.      Pupils: Pupils are equal, round, and reactive to light.   Cardiovascular:      Rate and Rhythm: Normal rate and regular rhythm.      Heart sounds: Normal heart sounds. No murmur heard.    No friction rub. No gallop.   Pulmonary:      Effort: Pulmonary effort is normal. No respiratory distress.      Breath sounds: Normal breath sounds. No wheezing or rales.   Musculoskeletal:      Cervical back: Normal range of motion and neck supple.   Lymphadenopathy:      Cervical: No cervical adenopathy.   Skin:     General: Skin is warm.      Coloration: Skin is not pale.      Findings: No erythema or rash.   Neurological:      Mental Status: He is alert and oriented to person, place, and time.   Psychiatric:         Mood and Affect: Mood normal.         Behavior: Behavior normal.         Thought Content: Thought content normal.         Judgment: Judgment normal.       Assessment:     1. Cough, unspecified type    2. Viral URI with cough      Plan:   Cough, unspecified type  -     POCT Influenza A/B  -     POCT COVID-19 Rapid Screening  -     benzonatate (TESSALON) 200 MG capsule; Take 1 capsule (200 mg total) by mouth 3 (three) times daily as needed for Cough.  Dispense: 30 capsule; Refill: 0  -     promethazine-dextromethorphan (PROMETHAZINE-DM) 6.25-15 mg/5 mL Syrp; Take 5 mLs by mouth every 6 (six) hours as needed (cough).  Dispense: 118 mL; Refill: 0    Viral URI with cough  -otc symptomatic relief medications.     Follow up if symptoms worsen or fail to improve.

## 2022-12-24 PROBLEM — J32.9 SINUSITIS: Status: ACTIVE | Noted: 2022-12-24

## 2022-12-24 PROBLEM — H66.90 OTITIS MEDIA: Status: ACTIVE | Noted: 2022-12-24

## 2023-02-10 ENCOUNTER — OFFICE VISIT (OUTPATIENT)
Dept: INTERNAL MEDICINE | Facility: CLINIC | Age: 23
End: 2023-02-10
Payer: COMMERCIAL

## 2023-02-10 VITALS
TEMPERATURE: 98 F | SYSTOLIC BLOOD PRESSURE: 144 MMHG | DIASTOLIC BLOOD PRESSURE: 76 MMHG | HEIGHT: 74 IN | HEART RATE: 84 BPM | BODY MASS INDEX: 31.55 KG/M2 | WEIGHT: 245.81 LBS | OXYGEN SATURATION: 99 %

## 2023-02-10 DIAGNOSIS — T14.8XXA SPRAIN AND STRAIN: Primary | ICD-10-CM

## 2023-02-10 PROCEDURE — 3078F PR MOST RECENT DIASTOLIC BLOOD PRESSURE < 80 MM HG: ICD-10-PCS | Mod: CPTII,S$GLB,, | Performed by: EMERGENCY MEDICINE

## 2023-02-10 PROCEDURE — 3077F SYST BP >= 140 MM HG: CPT | Mod: CPTII,S$GLB,, | Performed by: EMERGENCY MEDICINE

## 2023-02-10 PROCEDURE — 3008F PR BODY MASS INDEX (BMI) DOCUMENTED: ICD-10-PCS | Mod: CPTII,S$GLB,, | Performed by: EMERGENCY MEDICINE

## 2023-02-10 PROCEDURE — 3078F DIAST BP <80 MM HG: CPT | Mod: CPTII,S$GLB,, | Performed by: EMERGENCY MEDICINE

## 2023-02-10 PROCEDURE — 99212 PR OFFICE/OUTPT VISIT, EST, LEVL II, 10-19 MIN: ICD-10-PCS | Mod: S$GLB,,, | Performed by: EMERGENCY MEDICINE

## 2023-02-10 PROCEDURE — 3008F BODY MASS INDEX DOCD: CPT | Mod: CPTII,S$GLB,, | Performed by: EMERGENCY MEDICINE

## 2023-02-10 PROCEDURE — 99999 PR PBB SHADOW E&M-EST. PATIENT-LVL III: ICD-10-PCS | Mod: PBBFAC,,, | Performed by: EMERGENCY MEDICINE

## 2023-02-10 PROCEDURE — 3077F PR MOST RECENT SYSTOLIC BLOOD PRESSURE >= 140 MM HG: ICD-10-PCS | Mod: CPTII,S$GLB,, | Performed by: EMERGENCY MEDICINE

## 2023-02-10 PROCEDURE — 1159F PR MEDICATION LIST DOCUMENTED IN MEDICAL RECORD: ICD-10-PCS | Mod: CPTII,S$GLB,, | Performed by: EMERGENCY MEDICINE

## 2023-02-10 PROCEDURE — 99999 PR PBB SHADOW E&M-EST. PATIENT-LVL III: CPT | Mod: PBBFAC,,, | Performed by: EMERGENCY MEDICINE

## 2023-02-10 PROCEDURE — 1159F MED LIST DOCD IN RCRD: CPT | Mod: CPTII,S$GLB,, | Performed by: EMERGENCY MEDICINE

## 2023-02-10 PROCEDURE — 99212 OFFICE O/P EST SF 10 MIN: CPT | Mod: S$GLB,,, | Performed by: EMERGENCY MEDICINE

## 2023-02-11 NOTE — PROGRESS NOTES
Subjective:       Patient ID: Royal Martinez II is a 22 y.o. male.    Chief Complaint: Ankle Injury    Ankle Injury   Pertinent negatives include no numbness.     22 yr AAM, no PMH, [employer] student, here c/o slight pain R ankle/Achilles area while playing basket ball yesterday, no fall. He can move around easily, no swelling or redness at the tendon, can felx and extend, invert jenny R foot, no tenderness over Achilles tendon.     Past Medical History:   Diagnosis Date    ALLERGIC RHINITIS     Allergic rhinitis 11/23/2020    Asthma, currently inactive     Childhood asthma 11/23/2020    Obesity     Overweight(278.02)     Patellar tendonitis of left knee      Past Surgical History:   Procedure Laterality Date    CIRCUMCISION       Past Surgical History:   Procedure Laterality Date    CIRCUMCISION       Social History     Socioeconomic History    Marital status: Single   Occupational History    Occupation: Student   Tobacco Use    Smoking status: Never    Smokeless tobacco: Never   Substance and Sexual Activity    Alcohol use: No    Drug use: Never    Sexual activity: Not Currently   Social History Narrative    Lives with parents and sib, no pets.    11th grade- [employer].                                  Review of patient's allergies indicates:  No Known Allergies  Current Outpatient Medications   Medication Sig    fluticasone propionate (FLONASE) 50 mcg/actuation nasal spray 1 spray (50 mcg total) by Each Nostril route 2 (two) times daily as needed for Rhinitis (nasal congestion). (Patient not taking: Reported on 2/10/2023)    ibuprofen (ADVIL,MOTRIN) 800 MG tablet Take 1 tablet (800 mg total) by mouth every 6 (six) hours as needed for Pain. (Patient not taking: Reported on 2/10/2023)     No current facility-administered medications for this visit.           Review of Systems   Constitutional: Negative.  Negative for appetite change, chills, fatigue and fever.   HENT: Negative.  Negative for congestion,  ear pain, facial swelling, hearing loss, nosebleeds, postnasal drip, rhinorrhea, sinus pressure, sneezing, sore throat, trouble swallowing and voice change.    Eyes: Negative.  Negative for pain and redness.   Respiratory: Negative.  Negative for cough, chest tightness, shortness of breath and wheezing.    Cardiovascular: Negative.  Negative for chest pain, palpitations and leg swelling.   Gastrointestinal:  Negative for abdominal pain, blood in stool, constipation, diarrhea, nausea and vomiting.   Endocrine: Negative for cold intolerance, heat intolerance, polydipsia, polyphagia and polyuria.   Genitourinary:  Negative for difficulty urinating, dysuria, flank pain, frequency, hematuria, scrotal swelling, testicular pain and urgency.   Musculoskeletal:  Negative for arthralgias, back pain, gait problem, joint swelling, myalgias, neck pain and neck stiffness.   Skin:  Negative for pallor, rash and wound.   Allergic/Immunologic: Negative for environmental allergies and food allergies.   Neurological: Negative.  Negative for dizziness, tremors, seizures, syncope, facial asymmetry, speech difficulty, weakness, light-headedness, numbness and headaches.   Hematological: Negative.  Does not bruise/bleed easily.   Psychiatric/Behavioral: Negative.     All other systems reviewed and are negative.    Objective:      Physical Exam  Vitals and nursing note reviewed.   Constitutional:       Appearance: Normal appearance. He is well-developed.      Comments: Big an tall man, NAD, normal gait   HENT:      Head: Normocephalic and atraumatic.      Right Ear: External ear normal.      Left Ear: External ear normal.      Nose: No congestion.      Mouth/Throat:      Mouth: Mucous membranes are moist.      Pharynx: Oropharynx is clear.      Comments: Very narrow oropharynx  Eyes:      General: No scleral icterus.     Conjunctiva/sclera: Conjunctivae normal.      Pupils: Pupils are equal, round, and reactive to light.   Cardiovascular:       Rate and Rhythm: Normal rate and regular rhythm.      Pulses: Normal pulses.      Heart sounds: Normal heart sounds.   Pulmonary:      Effort: Pulmonary effort is normal. No respiratory distress.      Breath sounds: Normal breath sounds. No wheezing or rales.   Abdominal:      General: Abdomen is flat. Bowel sounds are normal. There is no distension.      Palpations: Abdomen is soft.      Tenderness: There is no abdominal tenderness. There is no guarding.   Genitourinary:     Rectum: Normal.      Comments: deferred  Musculoskeletal:         General: Normal range of motion.      Cervical back: Normal range of motion and neck supple.   Skin:     General: Skin is warm and dry.      Capillary Refill: Capillary refill takes less than 2 seconds.      Coloration: Skin is not jaundiced.   Neurological:      General: No focal deficit present.      Mental Status: He is alert and oriented to person, place, and time.      Deep Tendon Reflexes: Reflexes are normal and symmetric.   Psychiatric:         Behavior: Behavior normal.         Thought Content: Thought content normal.         Judgment: Judgment normal.       Assessment:     Vitals:    02/10/23 1722   BP: (!) 144/76   Pulse: 84   Temp: 97.6 °F (36.4 °C)     1. Sprain and strain: rest, apply moist heat prn, Motrin prn.

## 2023-02-16 ENCOUNTER — PATIENT OUTREACH (OUTPATIENT)
Dept: ADMINISTRATIVE | Facility: HOSPITAL | Age: 23
End: 2023-02-16
Payer: COMMERCIAL

## 2023-02-16 NOTE — PROGRESS NOTES
Patient due for the following    COVID-19 Vaccine (4 - Booster for Pfizer series)    TETANUS VACCINE       Immunizations: reviewed and updated  Care Everywhere: triggered  Care Teams: up to date  Outreach:Sean

## 2023-04-05 ENCOUNTER — TELEPHONE (OUTPATIENT)
Dept: ORTHOPEDICS | Facility: CLINIC | Age: 23
End: 2023-04-05
Payer: COMMERCIAL

## 2023-04-05 DIAGNOSIS — M25.561 RIGHT KNEE PAIN, UNSPECIFIED CHRONICITY: Primary | ICD-10-CM

## 2023-04-06 ENCOUNTER — HOSPITAL ENCOUNTER (OUTPATIENT)
Dept: RADIOLOGY | Facility: HOSPITAL | Age: 23
Discharge: HOME | End: 2023-04-06
Attending: ORTHOPAEDIC SURGERY
Payer: COMMERCIAL

## 2023-04-06 ENCOUNTER — OFFICE VISIT (OUTPATIENT)
Dept: ORTHOPEDICS | Facility: CLINIC | Age: 23
End: 2023-04-06
Payer: COMMERCIAL

## 2023-04-06 VITALS — WEIGHT: 245.81 LBS | HEIGHT: 74 IN | BODY MASS INDEX: 31.55 KG/M2

## 2023-04-06 DIAGNOSIS — M25.561 RIGHT KNEE PAIN, UNSPECIFIED CHRONICITY: ICD-10-CM

## 2023-04-06 DIAGNOSIS — S83.281A TEAR OF LATERAL MENISCUS OF RIGHT KNEE, CURRENT, UNSPECIFIED TEAR TYPE, INITIAL ENCOUNTER: Primary | ICD-10-CM

## 2023-04-06 PROCEDURE — 99204 OFFICE O/P NEW MOD 45 MIN: CPT | Mod: S$GLB,,, | Performed by: ORTHOPAEDIC SURGERY

## 2023-04-06 PROCEDURE — 73564 X-RAY EXAM KNEE 4 OR MORE: CPT | Mod: 26,RT,, | Performed by: RADIOLOGY

## 2023-04-06 PROCEDURE — 73564 XR KNEE ORTHO RIGHT WITH FLEXION: ICD-10-PCS | Mod: 26,RT,, | Performed by: RADIOLOGY

## 2023-04-06 PROCEDURE — 73562 XR KNEE ORTHO RIGHT WITH FLEXION: ICD-10-PCS | Mod: 26,LT,, | Performed by: RADIOLOGY

## 2023-04-06 PROCEDURE — 3008F BODY MASS INDEX DOCD: CPT | Mod: CPTII,S$GLB,, | Performed by: ORTHOPAEDIC SURGERY

## 2023-04-06 PROCEDURE — 3008F PR BODY MASS INDEX (BMI) DOCUMENTED: ICD-10-PCS | Mod: CPTII,S$GLB,, | Performed by: ORTHOPAEDIC SURGERY

## 2023-04-06 PROCEDURE — 99999 PR PBB SHADOW E&M-EST. PATIENT-LVL III: ICD-10-PCS | Mod: PBBFAC,,, | Performed by: ORTHOPAEDIC SURGERY

## 2023-04-06 PROCEDURE — 1159F MED LIST DOCD IN RCRD: CPT | Mod: CPTII,S$GLB,, | Performed by: ORTHOPAEDIC SURGERY

## 2023-04-06 PROCEDURE — 73562 X-RAY EXAM OF KNEE 3: CPT | Mod: 26,LT,, | Performed by: RADIOLOGY

## 2023-04-06 PROCEDURE — 1159F PR MEDICATION LIST DOCUMENTED IN MEDICAL RECORD: ICD-10-PCS | Mod: CPTII,S$GLB,, | Performed by: ORTHOPAEDIC SURGERY

## 2023-04-06 PROCEDURE — 99999 PR PBB SHADOW E&M-EST. PATIENT-LVL III: CPT | Mod: PBBFAC,,, | Performed by: ORTHOPAEDIC SURGERY

## 2023-04-06 PROCEDURE — 99204 PR OFFICE/OUTPT VISIT, NEW, LEVL IV, 45-59 MIN: ICD-10-PCS | Mod: S$GLB,,, | Performed by: ORTHOPAEDIC SURGERY

## 2023-04-06 PROCEDURE — 73564 X-RAY EXAM KNEE 4 OR MORE: CPT | Mod: TC,RT

## 2023-04-06 NOTE — PATIENT INSTRUCTIONS
Assessment:  Royal Martinez II is a  22 y.o. male   [employer] Student with a chief complaint of Pain of the Right Knee    Right knee possible Lateral Meniscus Tear vs IT Band    Encounter Diagnosis   Name Primary?    Tear of lateral meniscus of right knee, current, unspecified tear type, initial encounter Yes      Plan:  MRI of Right Knee    Follow-up: After MRI or sooner if there are any problems between now and then.    Leave Review:   Google: Leave Google Review  Healthgrades: Leave Healthgrades Review    After Hours Number: [phone]

## 2023-04-06 NOTE — PROGRESS NOTES
Patient ID: Royal Martinez II  YOB: 2000  MRN: [id number]    Chief Complaint: Pain of the Right Knee      Referred By: Jones    History of Present Illness: Royal Martinez II is a  22 y.o. male   [employer] Student with a chief complaint of Pain of the Right Knee    Royal is here today for R knee pain. He rates his pain as a 2/10. He doesn't remember any kind of injury that caused the pain. He thinks it is most likely from playing basketball. He started having pain around 3/23/23. He describes his pain as dull. He is not taking any medication and is not going to PT. His pain is worsened by flexion. His pain is alleviated by RICE. He states that his pain has gotten better since the injury occurred.      HPI    Past Medical History:   Past Medical History:   Diagnosis Date    ALLERGIC RHINITIS     Allergic rhinitis 2020    Asthma, currently inactive     Childhood asthma 2020    Obesity     Overweight(278.02)     Patellar tendonitis of left knee      Past Surgical History:   Procedure Laterality Date    CIRCUMCISION       Family History   Problem Relation Age of Onset    Alcohol abuse Paternal Grandfather         now     Hypertension Father     Obesity Father     Hypertension Maternal Grandfather     Hypertension Mother     Breast cancer Mother 40    Diabetes Unknown         maternal side    Acne Maternal Aunt     Psoriasis Cousin     Diabetes Maternal Uncle     Depression Neg Hx     Suicidality Neg Hx     Eczema Neg Hx     Melanoma Neg Hx     Lupus Neg Hx      Social History     Socioeconomic History    Marital status: Single   Occupational History    Occupation: Student   Tobacco Use    Smoking status: Never    Smokeless tobacco: Never   Substance and Sexual Activity    Alcohol use: No    Drug use: Never    Sexual activity: Not Currently   Social History Narrative    Lives with parents and sib, no pets.    11th grade- [employer].                                   Medication List with Changes/Refills   Current Medications    FLUTICASONE PROPIONATE (FLONASE) 50 MCG/ACTUATION NASAL SPRAY    1 spray (50 mcg total) by Each Nostril route 2 (two) times daily as needed for Rhinitis (nasal congestion).    IBUPROFEN (ADVIL,MOTRIN) 800 MG TABLET    Take 1 tablet (800 mg total) by mouth every 6 (six) hours as needed for Pain.     Review of patient's allergies indicates:  No Known Allergies  ROS    Physical Exam:   Body mass index is 31.56 kg/m².  There were no vitals filed for this visit.   GENERAL: Well appearing, appropriate for stated age, no acute distress.  CARDIOVASCULAR: Pulses regular by peripheral palpation.  PULMONARY: Respirations are even and non-labored.  NEURO: Awake, alert, and oriented x 3.  PSYCH: Mood & affect are appropriate.  HEENT: Head is normocephalic and atraumatic.  Ortho/SPM Exam  Right knee: mild eff  + mcmurrays  Ttp lat JL  No pain along IT band  0-130  Stable to v/v at 0/30  1a lachman  Calf soft NT  Intact EHL, FHL, gastrocsoleus, and tibialis anterior. Sensation intact to light touch in superficial peroneal, deep peroneal, tibial, sural, and saphenous nerve distributions. Foot warm and well perfused with capillary refill of less than 2 seconds and palpable pedal pulses.    Imaging:    X-ray Knee Ortho Right with Flexion  Narrative: EXAMINATION:  XR KNEE ORTHO RIGHT WITH FLEXION    CLINICAL HISTORY:  Pain in right knee    TECHNIQUE:  AP standing views of both knees, AP flexion views of both knees, lateral view of the right knee and Merchant views of both knees    COMPARISON:  12/07/2017    FINDINGS:  The joint spaces of all 3 compartments of the right knee are well maintained.  No joint effusion.  No acute fracture or dislocation.  Impression: 1.  As above    Electronically signed by: Jefe Edouard DO  Date:    04/06/2023  Time:    14:03      Relevant imaging results reviewed and interpreted by me, discussed with the patient and / or family today.      Other Tests:         Patient Instructions   Assessment:  Royal Martinez II is a  22 y.o. male   [employer] Student with a chief complaint of Pain of the Right Knee    Right knee possible Lateral Meniscus Tear vs IT Band    Encounter Diagnosis   Name Primary?    Tear of lateral meniscus of right knee, current, unspecified tear type, initial encounter Yes      Plan:  MRI of Right Knee    Follow-up: After MRI or sooner if there are any problems between now and then.    Leave Review:   Google: Leave Google Review  Healthgrades: Leave Healthgrades Review    After Hours Number: [phone]      Provider Note/Medical Decision Making:       I discussed worrisome and red flag signs and symptoms with the patient. The patient expressed understanding and agreed to alert me immediately or to go to the emergency room if they experience any of these.   Treatment plan was developed with input from the patient/family, and they expressed understanding and agreement with the plan. All questions were answered today.          Brian Torres MD  Orthopaedic Surgery & Sports Medicine       Disclaimer: This note was prepared using a voice recognition system and is likely to have sound alike errors within the text.

## 2023-04-13 ENCOUNTER — HOSPITAL ENCOUNTER (OUTPATIENT)
Dept: RADIOLOGY | Facility: HOSPITAL | Age: 23
Discharge: HOME | End: 2023-04-13
Attending: ORTHOPAEDIC SURGERY
Payer: COMMERCIAL

## 2023-04-13 DIAGNOSIS — S83.281A TEAR OF LATERAL MENISCUS OF RIGHT KNEE, CURRENT, UNSPECIFIED TEAR TYPE, INITIAL ENCOUNTER: ICD-10-CM

## 2023-04-13 PROCEDURE — 73721 MRI JNT OF LWR EXTRE W/O DYE: CPT | Mod: TC,PN,RT

## 2023-04-13 PROCEDURE — 73721 MRI JNT OF LWR EXTRE W/O DYE: CPT | Mod: 26,RT,, | Performed by: RADIOLOGY

## 2023-04-13 PROCEDURE — 73721 MRI KNEE WITHOUT CONTRAST RIGHT: ICD-10-PCS | Mod: 26,RT,, | Performed by: RADIOLOGY

## 2023-04-14 ENCOUNTER — OFFICE VISIT (OUTPATIENT)
Dept: ORTHOPEDICS | Facility: CLINIC | Age: 23
End: 2023-04-14
Payer: COMMERCIAL

## 2023-04-14 VITALS — WEIGHT: 245 LBS | HEIGHT: 74 IN | BODY MASS INDEX: 31.44 KG/M2

## 2023-04-14 DIAGNOSIS — S83.261D PERIPHERAL TEAR OF LATERAL MENISCUS OF RIGHT KNEE AS CURRENT INJURY, SUBSEQUENT ENCOUNTER: Primary | ICD-10-CM

## 2023-04-14 PROCEDURE — 99214 PR OFFICE/OUTPT VISIT, EST, LEVL IV, 30-39 MIN: ICD-10-PCS | Mod: S$GLB,,, | Performed by: ORTHOPAEDIC SURGERY

## 2023-04-14 PROCEDURE — 99999 PR PBB SHADOW E&M-EST. PATIENT-LVL III: ICD-10-PCS | Mod: PBBFAC,,, | Performed by: ORTHOPAEDIC SURGERY

## 2023-04-14 PROCEDURE — 3008F PR BODY MASS INDEX (BMI) DOCUMENTED: ICD-10-PCS | Mod: CPTII,S$GLB,, | Performed by: ORTHOPAEDIC SURGERY

## 2023-04-14 PROCEDURE — 99214 OFFICE O/P EST MOD 30 MIN: CPT | Mod: S$GLB,,, | Performed by: ORTHOPAEDIC SURGERY

## 2023-04-14 PROCEDURE — 1159F MED LIST DOCD IN RCRD: CPT | Mod: CPTII,S$GLB,, | Performed by: ORTHOPAEDIC SURGERY

## 2023-04-14 PROCEDURE — 3008F BODY MASS INDEX DOCD: CPT | Mod: CPTII,S$GLB,, | Performed by: ORTHOPAEDIC SURGERY

## 2023-04-14 PROCEDURE — 1159F PR MEDICATION LIST DOCUMENTED IN MEDICAL RECORD: ICD-10-PCS | Mod: CPTII,S$GLB,, | Performed by: ORTHOPAEDIC SURGERY

## 2023-04-14 PROCEDURE — 99999 PR PBB SHADOW E&M-EST. PATIENT-LVL III: CPT | Mod: PBBFAC,,, | Performed by: ORTHOPAEDIC SURGERY

## 2023-04-14 NOTE — PROGRESS NOTES
Patient ID: Royal Martinez II  YOB: 2000  MRN: [id number]    Chief Complaint: Pain and Swelling of the Right Knee      Referred By: Jones    History of Present Illness: Royal Martinez II is a  22 y.o. male   [employer] Student with a chief complaint of Pain and Swelling of the Right Knee    The patient presents today for follow up evaluation of his right knee. He presents today to review his MRI. Overall no major changes since last visiti.   Recall from initial visit: Royal is here today for R knee pain. He rates his pain as a 2/10. He doesn't remember any kind of injury that caused the pain. He thinks it is most likely from playing basketball. He started having pain around 3/23/23. He describes his pain as dull. He is not taking any medication and is not going to PT. His pain is worsened by flexion. His pain is alleviated by RICE. He states that his pain has gotten better since the injury occurred.      HPI    Past Medical History:   Past Medical History:   Diagnosis Date    ALLERGIC RHINITIS     Allergic rhinitis 2020    Asthma, currently inactive     Childhood asthma 2020    Obesity     Overweight(278.02)     Patellar tendonitis of left knee      Past Surgical History:   Procedure Laterality Date    CIRCUMCISION       Family History   Problem Relation Age of Onset    Alcohol abuse Paternal Grandfather         now     Hypertension Father     Obesity Father     Hypertension Maternal Grandfather     Hypertension Mother     Breast cancer Mother 40    Diabetes Unknown         maternal side    Acne Maternal Aunt     Psoriasis Cousin     Diabetes Maternal Uncle     Depression Neg Hx     Suicidality Neg Hx     Eczema Neg Hx     Melanoma Neg Hx     Lupus Neg Hx      Social History     Socioeconomic History    Marital status: Single   Occupational History    Occupation: Student   Tobacco Use    Smoking status: Never    Smokeless tobacco: Never   Substance and Sexual Activity    Alcohol use: No     Drug use: Never    Sexual activity: Not Currently   Social History Narrative    Lives with parents and sib, no pets.    11th grade- [employer].                                  Medication List with Changes/Refills   Current Medications    FLUTICASONE PROPIONATE (FLONASE) 50 MCG/ACTUATION NASAL SPRAY    1 spray (50 mcg total) by Each Nostril route 2 (two) times daily as needed for Rhinitis (nasal congestion).    IBUPROFEN (ADVIL,MOTRIN) 800 MG TABLET    Take 1 tablet (800 mg total) by mouth every 6 (six) hours as needed for Pain.     Review of patient's allergies indicates:  No Known Allergies  ROS    Physical Exam:   Body mass index is 31.46 kg/m².  There were no vitals filed for this visit.   GENERAL: Well appearing, appropriate for stated age, no acute distress.  CARDIOVASCULAR: Pulses regular by peripheral palpation.  PULMONARY: Respirations are even and non-labored.  NEURO: Awake, alert, and oriented x 3.  PSYCH: Mood & affect are appropriate.  HEENT: Head is normocephalic and atraumatic.  Ortho/SPM Exam  Right knee: mild eff  + mcmurrays  Ttp lat JL  No pain along IT band  0-130  Stable to v/v at 0/30  1a lachman  Calf soft NT  Intact EHL, FHL, gastrocsoleus, and tibialis anterior. Sensation intact to light touch in superficial peroneal, deep peroneal, tibial, sural, and saphenous nerve distributions. Foot warm and well perfused with capillary refill of less than 2 seconds and palpable pedal pulses.    Imaging:    MRI Knee Without Contrast Right  Narrative: EXAMINATION:  MRI KNEE WITHOUT CONTRAST RIGHT    CLINICAL HISTORY:  Meniscal tear, untreated, new symptoms;Other tear of lateral meniscus, current injury, right knee, initial encounter    TECHNIQUE:  MR right knee performed multiplanar T1, proton density, T2, and STIR weighted sequences.    COMPARISON:  Right knee x-ray 04/06/2023.    FINDINGS:  There is extensive peripheral longitudinal tear involving the lateral meniscus, body and  posterior horn, involving the inferior articular surface.    There is additional horizontal tear component extending through the superior and inferior articular surfaces, near the free edge posterior horn lateral meniscus, towards the posterior junctional zone.    Medial meniscus, collateral ligaments, and cruciate ligaments maintain normal signal intensity morphology.    The extensor mechanism is intact.  There is a large knee joint effusion.  There is a thickened medial patella plica noted.    The chondral surfaces are well preserved.    The bone marrow signal intensity is normal.    There is no soft tissue swelling.  Impression: 1. Extensive lateral meniscal tear involving peripheral inferior articular surface contiguously involving body and posterior horn.  There is a separate oblique horizontal tear posterior horn lateral meniscus involving superior and inferior articular surfaces.  2. Large knee joint effusion.  Thickened medial patella plica.    Electronically signed by: Tariq Power MD  Date:    04/13/2023  Time:    16:54      Relevant imaging results reviewed and interpreted by me, discussed with the patient and / or family today.     Other Tests:         Patient Instructions   Assessment:  Royal Martinez II is a  22 y.o. male   [employer] Student with a chief complaint of Pain and Swelling of the Right Knee    Right knee lateral meniscus tear (acute)    Encounter Diagnosis   Name Primary?    Peripheral tear of lateral meniscus of right knee as current injury, subsequent encounter Yes        Plan:  Had a long discussion with the patient in regards to his diagnosis and treatment options as well as the surgical timing and rehabilitation course. Discussed surgery, pros,cons, risks, techniques and postoperative restrictions at length. All questions were answered.   Ultimately, Patient would like to proceed with surgery in [place]. He will let us know if he needs a referral.     Follow-up: PRN or sooner if there are  any problems between now and then.    Leave Review:   Google: Leave Google Review  Healthgrades: Leave Healthgrades Review    After Hours Number: [phone]      Provider Note/Medical Decision Making:       I had a long discussion with the patient and any present family regarding treatment options. I explained that although the meniscus will usually not heal on its own, not all meniscus tears need surgery. We discussed that many people will improve with therapy and nonoperative management. We discussed the blood supply of the meniscus and the fact that some patients and some tear patterns are more amenable to repair. We discussed that when performing operative treatment of meniscus tears, we attempt to repair tears that we think need to be repaired and have a good chance of healing. If we do perform a meniscectomy, we attempt to leave as much meniscus intact as possible. We discussed the implications of having a torn or deficient meniscus. We discussed the rehab, weight bearing, and postoperative differences between repair and resection, and we discussed postoperative expectations.  I had a long discussion with the patient about treatment options, including operative and nonoperative treatments. We discussed pros and cons of each including risks pertinent to surgery including pain, infection, bleeding, damage to adjacent structures like nerves and blood vessels, failure to heal, need for future surgeries, stiffness, instability, loss of limb, anesthesia risks like stroke, blood clot, loss of life. We discussed the possibility of need for later hardware removal in the case that hardware was used. We discussed common and uncommon risks, and discussed patient specific factors that may increase the risks present with surgery. All questions were answered. The patient expressed understanding of the pros and cons of surgery and wanted to proceed with surgical treatment.  I discussed worrisome and red flag signs and  symptoms with the patient. The patient expressed understanding and agreed to alert me immediately or to go to the emergency room if they experience any of these.   Treatment plan was developed with input from the patient/family, and they expressed understanding and agreement with the plan. All questions were answered today.          Brian Torres MD  Orthopaedic Surgery & Sports Medicine       Disclaimer: This note was prepared using a voice recognition system and is likely to have sound alike errors within the text.     I, Ruth Campbell, acted as a scribe for Brian Torres MD for the duration of this office visit.

## 2023-04-14 NOTE — PATIENT INSTRUCTIONS
Assessment:  Royal Martinez II is a  22 y.o. male   [employer] Student with a chief complaint of Pain and Swelling of the Right Knee    Right knee lateral meniscus tear (acute)    Encounter Diagnosis   Name Primary?    Peripheral tear of lateral meniscus of right knee as current injury, subsequent encounter Yes        Plan:  Had a long discussion with the patient in regards to his diagnosis and treatment options as well as the surgical timing and rehabilitation course. Discussed surgery, pros,cons, risks, techniques and postoperative restrictions at length. All questions were answered.   Ultimately, Patient would like to proceed with surgery in [place]. He will let us know if he needs a referral.     Follow-up: PRN or sooner if there are any problems between now and then.    Leave Review:   Google: Leave Google Review  Healthgrades: Leave Healthgrades Review    After Hours Number: [phone]

## 2023-04-17 ENCOUNTER — PATIENT MESSAGE (OUTPATIENT)
Dept: ORTHOPEDICS | Facility: CLINIC | Age: 23
End: 2023-04-17
Payer: COMMERCIAL

## 2023-04-24 ENCOUNTER — PATIENT OUTREACH (OUTPATIENT)
Dept: ADMINISTRATIVE | Facility: HOSPITAL | Age: 23
End: 2023-04-24
Payer: COMMERCIAL

## 2023-04-24 ENCOUNTER — PATIENT MESSAGE (OUTPATIENT)
Dept: ADMINISTRATIVE | Facility: HOSPITAL | Age: 23
End: 2023-04-24
Payer: COMMERCIAL

## 2023-04-24 NOTE — PROGRESS NOTES
Patient due for the following    COVID-19 Vaccine (4 - Booster for Pfizer series)    TETANUS VACCINE       Immunizations: reviewed and updated  Care Everywhere: triggered  Care Teams: up to date  Outreach: completed

## 2023-04-25 ENCOUNTER — TELEPHONE (OUTPATIENT)
Dept: PRIMARY CARE CLINIC | Facility: CLINIC | Age: 23
End: 2023-04-25
Payer: COMMERCIAL

## 2023-04-25 ENCOUNTER — PATIENT OUTREACH (OUTPATIENT)
Dept: ADMINISTRATIVE | Facility: HOSPITAL | Age: 23
End: 2023-04-25
Payer: COMMERCIAL

## 2023-04-25 VITALS — SYSTOLIC BLOOD PRESSURE: 123 MMHG | DIASTOLIC BLOOD PRESSURE: 65 MMHG

## 2023-04-25 NOTE — PROGRESS NOTES
Patient due for the following   Health Maintenance Due   Topic Date Due    COVID-19 Vaccine (4 - Booster for Pfizer series) 03/23/2022    TETANUS VACCINE  07/12/2022      Recorded remote blood pressure reading 123/65

## 2023-05-09 ENCOUNTER — OFFICE VISIT (OUTPATIENT)
Dept: SPORTS MEDICINE | Facility: CLINIC | Age: 23
End: 2023-05-09
Payer: COMMERCIAL

## 2023-05-09 VITALS
BODY MASS INDEX: 31.83 KG/M2 | HEIGHT: 74 IN | SYSTOLIC BLOOD PRESSURE: 145 MMHG | WEIGHT: 248 LBS | HEART RATE: 91 BPM | DIASTOLIC BLOOD PRESSURE: 79 MMHG

## 2023-05-09 DIAGNOSIS — M25.461 EFFUSION OF RIGHT KNEE: ICD-10-CM

## 2023-05-09 DIAGNOSIS — M23.91 INTERNAL DERANGEMENT OF RIGHT KNEE: ICD-10-CM

## 2023-05-09 DIAGNOSIS — S83.281A ACUTE LATERAL MENISCUS TEAR OF RIGHT KNEE, INITIAL ENCOUNTER: Primary | ICD-10-CM

## 2023-05-09 PROCEDURE — 99204 PR OFFICE/OUTPT VISIT, NEW, LEVL IV, 45-59 MIN: ICD-10-PCS | Mod: S$GLB,,, | Performed by: PHYSICIAN ASSISTANT

## 2023-05-09 PROCEDURE — 3008F BODY MASS INDEX DOCD: CPT | Mod: CPTII,S$GLB,, | Performed by: PHYSICIAN ASSISTANT

## 2023-05-09 PROCEDURE — 3077F PR MOST RECENT SYSTOLIC BLOOD PRESSURE >= 140 MM HG: ICD-10-PCS | Mod: CPTII,S$GLB,, | Performed by: PHYSICIAN ASSISTANT

## 2023-05-09 PROCEDURE — 3078F PR MOST RECENT DIASTOLIC BLOOD PRESSURE < 80 MM HG: ICD-10-PCS | Mod: CPTII,S$GLB,, | Performed by: PHYSICIAN ASSISTANT

## 2023-05-09 PROCEDURE — 1159F MED LIST DOCD IN RCRD: CPT | Mod: CPTII,S$GLB,, | Performed by: PHYSICIAN ASSISTANT

## 2023-05-09 PROCEDURE — 3077F SYST BP >= 140 MM HG: CPT | Mod: CPTII,S$GLB,, | Performed by: PHYSICIAN ASSISTANT

## 2023-05-09 PROCEDURE — 3078F DIAST BP <80 MM HG: CPT | Mod: CPTII,S$GLB,, | Performed by: PHYSICIAN ASSISTANT

## 2023-05-09 PROCEDURE — 3008F PR BODY MASS INDEX (BMI) DOCUMENTED: ICD-10-PCS | Mod: CPTII,S$GLB,, | Performed by: PHYSICIAN ASSISTANT

## 2023-05-09 PROCEDURE — 99999 PR PBB SHADOW E&M-EST. PATIENT-LVL III: ICD-10-PCS | Mod: PBBFAC,,, | Performed by: PHYSICIAN ASSISTANT

## 2023-05-09 PROCEDURE — 1160F PR REVIEW ALL MEDS BY PRESCRIBER/CLIN PHARMACIST DOCUMENTED: ICD-10-PCS | Mod: CPTII,S$GLB,, | Performed by: PHYSICIAN ASSISTANT

## 2023-05-09 PROCEDURE — 99204 OFFICE O/P NEW MOD 45 MIN: CPT | Mod: S$GLB,,, | Performed by: PHYSICIAN ASSISTANT

## 2023-05-09 PROCEDURE — 1160F RVW MEDS BY RX/DR IN RCRD: CPT | Mod: CPTII,S$GLB,, | Performed by: PHYSICIAN ASSISTANT

## 2023-05-09 PROCEDURE — 99999 PR PBB SHADOW E&M-EST. PATIENT-LVL III: CPT | Mod: PBBFAC,,, | Performed by: PHYSICIAN ASSISTANT

## 2023-05-09 PROCEDURE — 1159F PR MEDICATION LIST DOCUMENTED IN MEDICAL RECORD: ICD-10-PCS | Mod: CPTII,S$GLB,, | Performed by: PHYSICIAN ASSISTANT

## 2023-05-23 ENCOUNTER — OFFICE VISIT (OUTPATIENT)
Dept: SPORTS MEDICINE | Facility: CLINIC | Age: 23
End: 2023-05-23
Payer: COMMERCIAL

## 2023-05-23 ENCOUNTER — HOSPITAL ENCOUNTER (OUTPATIENT)
Dept: RADIOLOGY | Facility: HOSPITAL | Age: 23
Discharge: HOME | End: 2023-05-23
Attending: STUDENT IN AN ORGANIZED HEALTH CARE EDUCATION/TRAINING PROGRAM
Payer: COMMERCIAL

## 2023-05-23 VITALS
HEIGHT: 74 IN | SYSTOLIC BLOOD PRESSURE: 138 MMHG | BODY MASS INDEX: 31.83 KG/M2 | WEIGHT: 248 LBS | DIASTOLIC BLOOD PRESSURE: 71 MMHG | HEART RATE: 82 BPM

## 2023-05-23 DIAGNOSIS — S83.281A ACUTE LATERAL MENISCUS TEAR OF RIGHT KNEE, INITIAL ENCOUNTER: Primary | ICD-10-CM

## 2023-05-23 DIAGNOSIS — S83.281A ACUTE LATERAL MENISCUS TEAR OF RIGHT KNEE, INITIAL ENCOUNTER: ICD-10-CM

## 2023-05-23 DIAGNOSIS — S83.271A COMPLEX TEAR OF LATERAL MENISCUS OF RIGHT KNEE, UNSPECIFIED WHETHER OLD OR CURRENT TEAR, INITIAL ENCOUNTER: Primary | ICD-10-CM

## 2023-05-23 PROCEDURE — 1159F PR MEDICATION LIST DOCUMENTED IN MEDICAL RECORD: ICD-10-PCS | Mod: CPTII,S$GLB,, | Performed by: ORTHOPAEDIC SURGERY

## 2023-05-23 PROCEDURE — 99999 PR PBB SHADOW E&M-EST. PATIENT-LVL III: ICD-10-PCS | Mod: PBBFAC,,, | Performed by: ORTHOPAEDIC SURGERY

## 2023-05-23 PROCEDURE — 77073 BONE LENGTH STUDIES: CPT | Mod: 26,,, | Performed by: RADIOLOGY

## 2023-05-23 PROCEDURE — 3078F DIAST BP <80 MM HG: CPT | Mod: CPTII,S$GLB,, | Performed by: ORTHOPAEDIC SURGERY

## 2023-05-23 PROCEDURE — 99215 OFFICE O/P EST HI 40 MIN: CPT | Mod: S$GLB,,, | Performed by: ORTHOPAEDIC SURGERY

## 2023-05-23 PROCEDURE — 3075F SYST BP GE 130 - 139MM HG: CPT | Mod: CPTII,S$GLB,, | Performed by: ORTHOPAEDIC SURGERY

## 2023-05-23 PROCEDURE — 1159F MED LIST DOCD IN RCRD: CPT | Mod: CPTII,S$GLB,, | Performed by: ORTHOPAEDIC SURGERY

## 2023-05-23 PROCEDURE — 3078F PR MOST RECENT DIASTOLIC BLOOD PRESSURE < 80 MM HG: ICD-10-PCS | Mod: CPTII,S$GLB,, | Performed by: ORTHOPAEDIC SURGERY

## 2023-05-23 PROCEDURE — 77073 BONE LENGTH STUDIES: CPT | Mod: TC

## 2023-05-23 PROCEDURE — 3008F PR BODY MASS INDEX (BMI) DOCUMENTED: ICD-10-PCS | Mod: CPTII,S$GLB,, | Performed by: ORTHOPAEDIC SURGERY

## 2023-05-23 PROCEDURE — 3075F PR MOST RECENT SYSTOLIC BLOOD PRESS GE 130-139MM HG: ICD-10-PCS | Mod: CPTII,S$GLB,, | Performed by: ORTHOPAEDIC SURGERY

## 2023-05-23 PROCEDURE — 77073 XR HIP TO ANKLE: ICD-10-PCS | Mod: 26,,, | Performed by: RADIOLOGY

## 2023-05-23 PROCEDURE — 3008F BODY MASS INDEX DOCD: CPT | Mod: CPTII,S$GLB,, | Performed by: ORTHOPAEDIC SURGERY

## 2023-05-23 PROCEDURE — 99999 PR PBB SHADOW E&M-EST. PATIENT-LVL III: CPT | Mod: PBBFAC,,, | Performed by: ORTHOPAEDIC SURGERY

## 2023-05-23 PROCEDURE — 99215 PR OFFICE/OUTPT VISIT, EST, LEVL V, 40-54 MIN: ICD-10-PCS | Mod: S$GLB,,, | Performed by: ORTHOPAEDIC SURGERY

## 2023-05-23 NOTE — PROGRESS NOTES
CC: Right knee pain    22 y.o. Male who presents as a new patient to me.  He recently graduated from [employer] in [employer] Science. Complaint is right knee pain and swelling after an injury sustained playing basketball 1.5 month ago. He was seen at [facility] in [place] (Dr. Brian Torres) and referred here to us for consideration of surgery as he is moving back home after graduation. Patient says he felt a pop whenever he was jumping up playing basketball. He felt immediate pain and swelling in the knee afterwards. MRI ordered by outside provider demonstrates extensive tear of the lateral meniscus. He states his pain was initially lateral in the knee. Denies pain today although he has been limiting activities due to injury and has not returned to  basketball.  No prominent mechanical symptoms, occasional popping but no locking. Worse with activities but hasn't really been active since injury. Better with rest. Treatment thus far has included rest, activity modifications, oral medications and self directed therapy. Here today to discuss diagnosis and treatment options.      BMI 32    PMHx notable for none.   Negative for tobacco.   Negative for diabetes.     Pain Score:   1    REVIEW OF SYSTEMS:   Constitution: Negative. Negative for chills, fever and night sweats.    Hematologic/Lymphatic: Negative for bleeding problem. Does not bruise/bleed easily.   Skin: Negative for dry skin, itching and rash.   Musculoskeletal: Negative for falls. Positive for right knee pain and muscle weakness.     All other review of symptoms were reviewed and found to be noncontributory.     PAST MEDICAL HISTORY:   Past Medical History:   Diagnosis Date    ALLERGIC RHINITIS     Allergic rhinitis 11/23/2020    Asthma, currently inactive     Childhood asthma 11/23/2020    Obesity     Overweight(278.02)     Patellar tendonitis of left knee      PAST SURGICAL HISTORY:   Past Surgical History:   Procedure Laterality Date    CIRCUMCISION    "    FAMILY HISTORY:   Family History   Problem Relation Age of Onset    Alcohol abuse Paternal Grandfather         now     Hypertension Father     Obesity Father     Hypertension Maternal Grandfather     Hypertension Mother     Breast cancer Mother 40    Diabetes Unknown         maternal side    Acne Maternal Aunt     Psoriasis Cousin     Diabetes Maternal Uncle     Depression Neg Hx     Suicidality Neg Hx     Eczema Neg Hx     Melanoma Neg Hx     Lupus Neg Hx      SOCIAL HISTORY:   Social History     Socioeconomic History    Marital status: Single   Occupational History    Occupation: Student   Tobacco Use    Smoking status: Never    Smokeless tobacco: Never   Substance and Sexual Activity    Alcohol use: No    Drug use: Never    Sexual activity: Not Currently   Social History Narrative    Lives with parents and sib, no pets.    11th grade- [employer].                                  MEDICATIONS:     Current Outpatient Medications:     fluticasone propionate (FLONASE) 50 mcg/actuation nasal spray, 1 spray (50 mcg total) by Each Nostril route 2 (two) times daily as needed for Rhinitis (nasal congestion). (Patient not taking: Reported on 2023), Disp: 15 g, Rfl: 0    ibuprofen (ADVIL,MOTRIN) 800 MG tablet, Take 1 tablet (800 mg total) by mouth every 6 (six) hours as needed for Pain. (Patient not taking: Reported on 2023), Disp: 20 tablet, Rfl: 0    ALLERGIES:   Review of patient's allergies indicates:  No Known Allergies     PHYSICAL EXAMINATION:  /71   Pulse 82   Ht 6' 2" (1.88 m)   Wt 112.5 kg (248 lb)   BMI 31.84 kg/m²   General: Well-developed well-nourished 22 y.o. malein no acute distress   Cardiovascular: Regular rhythm by palpation of distal pulse, normal color and temperature, no concerning varicosities on symptomatic side   Lungs: No labored breathing or wheezing appreciated   Neuro: Alert and oriented ×3   Psychiatric: well oriented to person, place and time, " demonstrates normal mood and affect   Skin: No rashes, lesions or ulcers, normal temperature, turgor, and texture on involved extremity    Ortho/SPM Exam  Examination of the right knee demonstrates intact extensor mechanism. 1-2+ effusion. Central patellar tracking. No patellar apprehension. Normal patellar mobility. Full extension. Flexion to 130. Mild pain/discomfort posterior/laterally with forced flexion.Tenderness along the lateral joint line, none medially. Positive Imelda's for pain laterally today.  Negative Lachman.  Negative pivot shift.  Stable to varus/valgus stress testing at 0 and 30 deg. Negative posterior drawer. NVI.    IMAGING:  X-rays including standing, weight bearing AP and flexion bilateral knees, RIGHT knee lateral and sunrise views ordered and images reviewed by me show:    No fracture or dislocation. No degenerative changes    Full-length standing radiographs show valgus standing alignment more so on the right than the left with an approximate 6.5 degree correction angle.    MRI Right Knee 4/13/23:  1. Extensive lateral meniscal tear involving peripheral inferior articular surface contiguously involving body and posterior horn.  There is a separate oblique horizontal tear posterior horn lateral meniscus involving superior and inferior articular surfaces.  2. Large knee joint effusion.  Thickened medial patella plica.    MRI reviewed by me and discussed with patient. Study shows: Complex lateral meniscal tear with longitudinal tear involving body as well as horizontal component extending into posterior horn.    ASSESSMENT:      ICD-10-CM ICD-9-CM   1. Complex tear of lateral meniscus of right knee, unspecified whether old or current tear, initial encounter  S83.271A 836.1     Genu valgum    PLAN:       -Findings and treatment options were discussed with the patient and his mother, both operative and non operative.  This is a complex appearing lateral meniscus tear and I think there likely  is some degree of a chronic component.  Perhaps pre-existing pathology recently symptomatic.  We discussed the significance of his underlying valgus malalignment.  This measures at about 6.5° correction angle.  No pre-existing clinical issues by report.  Surgery is recommended given his ongoing pain and swelling.  We discussed the details of arthroscopic intervention to include possible inside-out lateral meniscus repair versus partial meniscectomy in the event of very poor tissue quality and irreparability.  The goal would be to repair this tissue however I do think there is some increased risk for tissue nonhealing or retear in the setting of his malalignment.  The only way to correct that would be a distal femoral osteotomy but we agreed to hold off on that for now.  He does not have interest in that.  The expected postop rehab and recovery course was reviewed with the patient and his mother.  They do wish to proceed.  -Plan for a Right knee arthroscopic lateral meniscus repair versus partial meniscectomy as indicated. The details of such were discussed to include the expected postop rehab and recovery course.    -Planned DOS for 6/12/23.   -No clearance needed.  I would like to have him measured for a lateral  brace at his preop appointment.  We will plan to convert to a lateral  brace at the 6 week appointment post surgery.  -RTC for preoperative appointment   -All questions answered    Informed Consent:    The details of the surgical procedure were explained, including the location of probable incisions and a description of possible hardware and/or grafts to be used. Alternatives to both operative and non-operative options with associated risks and benefits were discussed. The patient understands the likely convalescence after surgery and, in particular, the expected postop rehab and recovery course. The outlined risks and potential complications of the proposed procedure include but are not  limited to: infection, poor wound healing, scarring, deformity, stiffness, swelling, continued or recurrent pain, instability, hardware or prosthetic failure if implanted, symptomatic hardware requiring removal, weakness, neurovascular injury, numbness, chronic regional pain disorder, tissue nonhealing/irreparability/retear, subsequent contralateral limb injury or pathology, chondral injury, arthritis, fracture, blood clot formation, inability to return to previous level of activity, anesthetic or regional block complication up to death, need for additional procedure as indicated intraoperatively, and potential need for further surgery.    The patient was also informed and understands that the risks of surgery are greater for patients with a current condition or history of heart disease, obesity, clotting disorders, recurrent infections, steroid use, current or past smoking, and factors such as sedentary lifestyle and noncompliance with medications, therapy or follow-up. The degree of the increased risk is hard to estimate with any degree of precision. If applicable, smoking cessation was discussed.     All questions were answered. The patient has verbalized understanding of these issues and wishes to proceed with the surgery as discussed.

## 2023-05-26 ENCOUNTER — PATIENT MESSAGE (OUTPATIENT)
Dept: PREADMISSION TESTING | Facility: HOSPITAL | Age: 23
End: 2023-05-26
Payer: COMMERCIAL

## 2023-05-26 NOTE — ANESTHESIA PAT ROS NOTE
05/26/2023  Royal Martinez II is a 22 y.o., male.      Pre-op Assessment    I have reviewed the Patient Summary Reports.       I have reviewed the Medications.     Review of Systems  Anesthesia Hx:  No previous Anesthesia   Neg history of prior surgery.             Social:  Non-Smoker, No Alcohol Use       Hematology/Oncology:  Hematology Normal   Oncology Normal                                   EENT/Dental:  chronic allergic rhinitis Regular astigmatism of both eyes          Cardiovascular:  Cardiovascular Normal Exercise tolerance: good                Denies VOGEL.                            Pulmonary:    Asthma asymptomatic  Denies Shortness of breath.   Childhood asthma               Renal/:  Renal/ Normal  Denies Chronic Renal Disease.   Circumcision             Hepatic/GI:  Hepatic/GI Normal     Denies GERD. Denies Liver Disease.            Musculoskeletal:     Acute lateral meniscus tear of right knee,  Patellar Tendinitis of Left Knee              Neurological:  Neurology Normal      Denies Headaches. Denies Seizures.                                Endocrine:  Denies Diabetes. Denies Hypothyroidism.        Obesity / BMI > 30  Dermatological:  Acanthosis nigricans   Psych:  Psychiatric Normal                   Past Medical History:   Diagnosis Date    ALLERGIC RHINITIS     Allergic rhinitis 11/23/2020    Asthma, currently inactive     Childhood asthma 11/23/2020    Obesity     Overweight(278.02)     Patellar tendonitis of left knee      Past Surgical History:   Procedure Laterality Date    CIRCUMCISION         Anesthesia Assessment: Preoperative EQUATION    Planned Procedure: Procedure(s) (LRB):  ARTHROSCOPY,KNEE,WITH MENISCUS REPAIR, LATERAL (Right)  Requested Anesthesia Type:General  Surgeon: ALEXANDER Emanuel MD  Service: Orthopedics  Known or anticipated Date of Surgery:6/12/2023    Surgeon notes:  reviewed    Electronic QUestionnaire Assessment completed via nurse interview with patient.        Triage considerations:     The patient has no apparent active cardiac condition (No unstable coronary Syndrome such as severe unstable angina or recent [<1 month] myocardial infarction, decompensated CHF, severe valvular   disease or significant arrhythmia)    Previous anesthesia records:None    Last PCP note: 3-6 months ago , [facility]       Other important co-morbidities: Obesity        EKG 11/23/2020:  Vent. Rate : 077 BPM     Atrial Rate : 077 BPM      P-R Int : 174 ms          QRS Dur : 098 ms       QT Int : 384 ms       P-R-T Axes : 062 063 035 degrees      QTc Int : 434 ms   Normal sinus rhythm   Early repolarization   Normal ECG   When compared with ECG of 2001 09:09,   Voltage in the precordial leads has increased   Axis is now normal   Confirmed by Bryce Brur MD ([id number]) on 11/23/2020 4:34:20 PM               Instructions given. (See in Nurse's note)      Ht: 6'2  Wt: 248 lb  BMI: 31.84  VAccinated

## 2023-06-08 ENCOUNTER — OFFICE VISIT (OUTPATIENT)
Dept: SPORTS MEDICINE | Facility: CLINIC | Age: 23
End: 2023-06-08
Payer: COMMERCIAL

## 2023-06-08 VITALS
WEIGHT: 242 LBS | HEART RATE: 82 BPM | SYSTOLIC BLOOD PRESSURE: 137 MMHG | DIASTOLIC BLOOD PRESSURE: 75 MMHG | HEIGHT: 74 IN | BODY MASS INDEX: 31.06 KG/M2

## 2023-06-08 DIAGNOSIS — S83.271A COMPLEX TEAR OF LATERAL MENISCUS OF RIGHT KNEE, UNSPECIFIED WHETHER OLD OR CURRENT TEAR, INITIAL ENCOUNTER: Primary | ICD-10-CM

## 2023-06-08 PROCEDURE — 99999 PR PBB SHADOW E&M-EST. PATIENT-LVL III: CPT | Mod: PBBFAC,,, | Performed by: PHYSICIAN ASSISTANT

## 2023-06-08 PROCEDURE — 99999 PR PBB SHADOW E&M-EST. PATIENT-LVL III: ICD-10-PCS | Mod: PBBFAC,,, | Performed by: PHYSICIAN ASSISTANT

## 2023-06-08 PROCEDURE — 99499 NO LOS: ICD-10-PCS | Mod: S$GLB,,, | Performed by: PHYSICIAN ASSISTANT

## 2023-06-08 PROCEDURE — 99499 UNLISTED E&M SERVICE: CPT | Mod: S$GLB,,, | Performed by: PHYSICIAN ASSISTANT

## 2023-06-08 RX ORDER — SODIUM CHLORIDE 9 MG/ML
INJECTION, SOLUTION INTRAVENOUS CONTINUOUS
Status: CANCELLED | OUTPATIENT
Start: 2023-06-08

## 2023-06-08 RX ORDER — CEFAZOLIN SODIUM 2 G/50ML
2 SOLUTION INTRAVENOUS
Status: CANCELLED | OUTPATIENT
Start: 2023-06-08

## 2023-06-08 RX ORDER — ASPIRIN 81 MG/1
81 TABLET ORAL 2 TIMES DAILY
Qty: 28 TABLET | Refills: 0 | Status: SHIPPED | OUTPATIENT
Start: 2023-06-08 | End: 2024-03-05

## 2023-06-08 RX ORDER — OXYCODONE HYDROCHLORIDE 5 MG/1
5-10 TABLET ORAL
Qty: 28 TABLET | Refills: 0 | Status: SHIPPED | OUTPATIENT
Start: 2023-06-08 | End: 2024-03-05

## 2023-06-08 RX ORDER — ONDANSETRON 4 MG/1
4 TABLET, ORALLY DISINTEGRATING ORAL EVERY 8 HOURS PRN
Qty: 30 TABLET | Refills: 0 | Status: SHIPPED | OUTPATIENT
Start: 2023-06-08 | End: 2024-03-05

## 2023-06-08 NOTE — H&P
Royal Martinez II  is here for a completion of his perioperative paperwork. he  Is scheduled to undergo  Right knee arthroscopic lateral meniscus repair versus partial meniscectomy as indicated on 2023.  He is a healthy individual and does not need clearance for this procedure.     Risks, indications and benefits of the surgical procedure were discussed with the patient. All questions with regard to surgery, rehab, expected return to functional activities, activities of daily living and recreational endeavors were answered to his satisfaction.    Discussed COVID-19 with the patient, they are aware of our current policies and procedures, were given the option of delaying surgery, and they elect to proceed.    Patient was informed and understands the risks of surgery are greater for patients with a current condition or hx of heart disease, obesity, clotting disorders, recurrent infections, steroid use, current or past smoking, and factors such as sedentary lifestyle and noncompliance with medications, therapy or f/u. The degree of the increased risk is hard to estimate w/ any degree of precision.    Once no other questions were asked, a brief history and physical exam was then performed.    PAST MEDICAL HISTORY:   Past Medical History:   Diagnosis Date    ALLERGIC RHINITIS     Allergic rhinitis 2020    Asthma, currently inactive     Childhood asthma 2020    Obesity     Overweight(278.02)     Patellar tendonitis of left knee      PAST SURGICAL HISTORY:   Past Surgical History:   Procedure Laterality Date    CIRCUMCISION       FAMILY HISTORY:   Family History   Problem Relation Age of Onset    Alcohol abuse Paternal Grandfather         now     Hypertension Father     Obesity Father     Hypertension Maternal Grandfather     Hypertension Mother     Breast cancer Mother 40    Diabetes Unknown         maternal side    Acne Maternal Aunt     Psoriasis Cousin     Diabetes Maternal Uncle     Depression  Neg Hx     Suicidality Neg Hx     Eczema Neg Hx     Melanoma Neg Hx     Lupus Neg Hx      SOCIAL HISTORY:   Social History     Socioeconomic History    Marital status: Single   Occupational History    Occupation: Student   Tobacco Use    Smoking status: Never    Smokeless tobacco: Never   Substance and Sexual Activity    Alcohol use: No    Drug use: Never    Sexual activity: Not Currently   Social History Narrative    Lives with parents and sib, no pets.    11th grade- [employer] and [employer].                                    MEDICATIONS:   Current Outpatient Medications:     fluticasone propionate (FLONASE) 50 mcg/actuation nasal spray, 1 spray (50 mcg total) by Each Nostril route 2 (two) times daily as needed for Rhinitis (nasal congestion). (Patient not taking: Reported on 4/14/2023), Disp: 15 g, Rfl: 0    ibuprofen (ADVIL,MOTRIN) 800 MG tablet, Take 1 tablet (800 mg total) by mouth every 6 (six) hours as needed for Pain. (Patient not taking: Reported on 4/14/2023), Disp: 20 tablet, Rfl: 0  ALLERGIES: Review of patient's allergies indicates:  No Known Allergies    Review of Systems   Constitution: Negative. Negative for chills, fever and night sweats.   HENT: Negative for congestion and headaches.    Eyes: Negative for blurred vision, left vision loss and right vision loss.   Cardiovascular: Negative for chest pain and syncope.   Respiratory: Negative for cough and shortness of breath.    Endocrine: Negative for polydipsia, polyphagia and polyuria.   Hematologic/Lymphatic: Negative for bleeding problem. Does not bruise/bleed easily.   Skin: Negative for dry skin, itching and rash.   Musculoskeletal: Negative for falls and muscle weakness.   Gastrointestinal: Negative for abdominal pain and bowel incontinence.   Genitourinary: Negative for bladder incontinence and nocturia.   Neurological: Negative for disturbances in coordination, loss of balance and seizures.   Psychiatric/Behavioral: Negative for  depression. The patient does not have insomnia.    Allergic/Immunologic: Negative for hives and persistent infections.     PHYSICAL EXAM:  GEN: A&Ox3, WD WN NAD  HEENT: WNL  CHEST: CTAB, no W/R/R  HEART: RRR, no M/R/G   ABD: Soft, NT ND, BS x4 QUADS  MS: Refer to previous note for detailed MS exam  NEURO: CN II-XII intact       The surgical consent was then reviewed with the patient, who agreed with all the contents of the consent form and it was signed.     PHYSICAL THERAPY:  He was also instructed regarding physical therapy and will begin on POD#1-3. He is doing physical therapy at [facility].    POST OP CARE: Instructions were reviewed including care of the wound and dressing after surgery and when he can shower.     PAIN MANAGEMENT: Royal Juan RIOS was instructed regarding the Polar ice unit that will be in place after surgery and his postoperative pain medications.     MEDICATION:  Roxicodone 5 mg 1-2 q 4 hours PRN for pain  Zofran 4 mg q 8 hours PRN for nausea and vomiting.  Aspirin 81mg BID x 2 weeks for DVT prophylaxis starting on the evening after surgery.      Post op meds to be delivered bedside prior to discharge. Deliver to family if patient is in surgery at 5pm.     Patient was instructed to purchase and take Colace to counter possible GI side effects of taking opiates.     DVT prophylaxis was discussed with the patient today including risk factors for developing DVTs and history of DVTs. The patient was asked if any specific recommendations were given from the doctor/s that did pre-operative surgical clearance.      If the patient was previously taking 81mg baby aspirin, they were told to not take additional baby aspirin, using the above stated aspirin and to restart the 81mg aspirin daily after completion of the aspirin dose.      Patient was also told to buy over the counter Prilosec medication and take it once daily for GI protection as long as they are taking NSAIDs or  Aspirin.     The patient was told that narcotic pain medications may make them drowsy and instructions were given to not sign legal documents, drive or operate heavy machinery, cars, or equipment while under the influence of narcotic medications.     As there were no other questions to be asked, he was given my business card along with Dr. Emanuel's business card if he has any questions or concerns prior to surgery or in the postop period.

## 2023-06-08 NOTE — H&P (VIEW-ONLY)
Royal Martinez II  is here for a completion of his perioperative paperwork. he  Is scheduled to undergo  Right knee arthroscopic lateral meniscus repair versus partial meniscectomy as indicated on 2023.  He is a healthy individual and does not need clearance for this procedure.     Risks, indications and benefits of the surgical procedure were discussed with the patient. All questions with regard to surgery, rehab, expected return to functional activities, activities of daily living and recreational endeavors were answered to his satisfaction.    Discussed COVID-19 with the patient, they are aware of our current policies and procedures, were given the option of delaying surgery, and they elect to proceed.    Patient was informed and understands the risks of surgery are greater for patients with a current condition or hx of heart disease, obesity, clotting disorders, recurrent infections, steroid use, current or past smoking, and factors such as sedentary lifestyle and noncompliance with medications, therapy or f/u. The degree of the increased risk is hard to estimate w/ any degree of precision.    Once no other questions were asked, a brief history and physical exam was then performed.    PAST MEDICAL HISTORY:   Past Medical History:   Diagnosis Date    ALLERGIC RHINITIS     Allergic rhinitis 2020    Asthma, currently inactive     Childhood asthma 2020    Obesity     Overweight(278.02)     Patellar tendonitis of left knee      PAST SURGICAL HISTORY:   Past Surgical History:   Procedure Laterality Date    CIRCUMCISION       FAMILY HISTORY:   Family History   Problem Relation Age of Onset    Alcohol abuse Paternal Grandfather         now     Hypertension Father     Obesity Father     Hypertension Maternal Grandfather     Hypertension Mother     Breast cancer Mother 40    Diabetes Unknown         maternal side    Acne Maternal Aunt     Psoriasis Cousin     Diabetes Maternal Uncle     Depression  Neg Hx     Suicidality Neg Hx     Eczema Neg Hx     Melanoma Neg Hx     Lupus Neg Hx      SOCIAL HISTORY:   Social History     Socioeconomic History    Marital status: Single   Occupational History    Occupation: Student   Tobacco Use    Smoking status: Never    Smokeless tobacco: Never   Substance and Sexual Activity    Alcohol use: No    Drug use: Never    Sexual activity: Not Currently   Social History Narrative    Lives with parents and sib, no pets.    11th grade- [employer].                                    MEDICATIONS:   Current Outpatient Medications:     fluticasone propionate (FLONASE) 50 mcg/actuation nasal spray, 1 spray (50 mcg total) by Each Nostril route 2 (two) times daily as needed for Rhinitis (nasal congestion). (Patient not taking: Reported on 4/14/2023), Disp: 15 g, Rfl: 0    ibuprofen (ADVIL,MOTRIN) 800 MG tablet, Take 1 tablet (800 mg total) by mouth every 6 (six) hours as needed for Pain. (Patient not taking: Reported on 4/14/2023), Disp: 20 tablet, Rfl: 0  ALLERGIES: Review of patient's allergies indicates:  No Known Allergies    Review of Systems   Constitution: Negative. Negative for chills, fever and night sweats.   HENT: Negative for congestion and headaches.    Eyes: Negative for blurred vision, left vision loss and right vision loss.   Cardiovascular: Negative for chest pain and syncope.   Respiratory: Negative for cough and shortness of breath.    Endocrine: Negative for polydipsia, polyphagia and polyuria.   Hematologic/Lymphatic: Negative for bleeding problem. Does not bruise/bleed easily.   Skin: Negative for dry skin, itching and rash.   Musculoskeletal: Negative for falls and muscle weakness.   Gastrointestinal: Negative for abdominal pain and bowel incontinence.   Genitourinary: Negative for bladder incontinence and nocturia.   Neurological: Negative for disturbances in coordination, loss of balance and seizures.   Psychiatric/Behavioral: Negative for  depression. The patient does not have insomnia.    Allergic/Immunologic: Negative for hives and persistent infections.     PHYSICAL EXAM:  GEN: A&Ox3, WD WN NAD  HEENT: WNL  CHEST: CTAB, no W/R/R  HEART: RRR, no M/R/G   ABD: Soft, NT ND, BS x4 QUADS  MS: Refer to previous note for detailed MS exam  NEURO: CN II-XII intact       The surgical consent was then reviewed with the patient, who agreed with all the contents of the consent form and it was signed.     PHYSICAL THERAPY:  He was also instructed regarding physical therapy and will begin on POD#1-3. He is doing physical therapy at [facility].    POST OP CARE: Instructions were reviewed including care of the wound and dressing after surgery and when he can shower.     PAIN MANAGEMENT: Royal Juan RIOS was instructed regarding the Polar ice unit that will be in place after surgery and his postoperative pain medications.     MEDICATION:  Roxicodone 5 mg 1-2 q 4 hours PRN for pain  Zofran 4 mg q 8 hours PRN for nausea and vomiting.  Aspirin 81mg BID x 2 weeks for DVT prophylaxis starting on the evening after surgery.      Post op meds to be delivered bedside prior to discharge. Deliver to family if patient is in surgery at 5pm.     Patient was instructed to purchase and take Colace to counter possible GI side effects of taking opiates.     DVT prophylaxis was discussed with the patient today including risk factors for developing DVTs and history of DVTs. The patient was asked if any specific recommendations were given from the doctor/s that did pre-operative surgical clearance.      If the patient was previously taking 81mg baby aspirin, they were told to not take additional baby aspirin, using the above stated aspirin and to restart the 81mg aspirin daily after completion of the aspirin dose.      Patient was also told to buy over the counter Prilosec medication and take it once daily for GI protection as long as they are taking NSAIDs or  Aspirin.     The patient was told that narcotic pain medications may make them drowsy and instructions were given to not sign legal documents, drive or operate heavy machinery, cars, or equipment while under the influence of narcotic medications.     As there were no other questions to be asked, he was given my business card along with Dr. Emanuel's business card if he has any questions or concerns prior to surgery or in the postop period.

## 2023-06-09 ENCOUNTER — ANESTHESIA EVENT (OUTPATIENT)
Dept: SURGERY | Facility: HOSPITAL | Age: 23
End: 2023-06-09
Payer: COMMERCIAL

## 2023-06-09 ENCOUNTER — TELEPHONE (OUTPATIENT)
Dept: SPORTS MEDICINE | Facility: CLINIC | Age: 23
End: 2023-06-09
Payer: COMMERCIAL

## 2023-06-12 ENCOUNTER — ANESTHESIA (OUTPATIENT)
Dept: SURGERY | Facility: HOSPITAL | Age: 23
End: 2023-06-12
Payer: COMMERCIAL

## 2023-06-13 ENCOUNTER — HOSPITAL ENCOUNTER (OUTPATIENT)
Facility: HOSPITAL | Age: 23
Discharge: HOME | End: 2023-06-13
Attending: ORTHOPAEDIC SURGERY | Admitting: ORTHOPAEDIC SURGERY
Payer: COMMERCIAL

## 2023-06-13 ENCOUNTER — PATIENT MESSAGE (OUTPATIENT)
Dept: SURGERY | Facility: HOSPITAL | Age: 23
End: 2023-06-13
Payer: COMMERCIAL

## 2023-06-13 VITALS
HEART RATE: 90 BPM | SYSTOLIC BLOOD PRESSURE: 136 MMHG | RESPIRATION RATE: 16 BRPM | OXYGEN SATURATION: 96 % | DIASTOLIC BLOOD PRESSURE: 62 MMHG | TEMPERATURE: 98 F | HEIGHT: 74 IN | BODY MASS INDEX: 29.52 KG/M2 | WEIGHT: 230 LBS

## 2023-06-13 DIAGNOSIS — S83.271A COMPLEX TEAR OF LATERAL MENISCUS OF RIGHT KNEE, UNSPECIFIED WHETHER OLD OR CURRENT TEAR, INITIAL ENCOUNTER: ICD-10-CM

## 2023-06-13 DIAGNOSIS — S83.281A ACUTE LATERAL MENISCUS TEAR OF RIGHT KNEE: Primary | ICD-10-CM

## 2023-06-13 PROCEDURE — D9220A PRA ANESTHESIA: ICD-10-PCS | Mod: ANES,,, | Performed by: ANESTHESIOLOGY

## 2023-06-13 PROCEDURE — 99900035 HC TECH TIME PER 15 MIN (STAT)

## 2023-06-13 PROCEDURE — 64448 NJX AA&/STRD FEM NRV NFS IMG: CPT | Performed by: ANESTHESIOLOGY

## 2023-06-13 PROCEDURE — 63600175 PHARM REV CODE 636 W HCPCS: Performed by: ANESTHESIOLOGY

## 2023-06-13 PROCEDURE — G0289 PR ARTHRO, LOOSE BODY + CHONDRO: ICD-10-PCS | Mod: RT,,, | Performed by: ORTHOPAEDIC SURGERY

## 2023-06-13 PROCEDURE — 63600175 PHARM REV CODE 636 W HCPCS: Performed by: STUDENT IN AN ORGANIZED HEALTH CARE EDUCATION/TRAINING PROGRAM

## 2023-06-13 PROCEDURE — 37000008 HC ANESTHESIA 1ST 15 MINUTES: Performed by: ORTHOPAEDIC SURGERY

## 2023-06-13 PROCEDURE — 71000039 HC RECOVERY, EACH ADD'L HOUR: Performed by: ORTHOPAEDIC SURGERY

## 2023-06-13 PROCEDURE — 29882 ARTHRS KNE SRG MNISC RPR M/L: CPT | Mod: 22,RT,, | Performed by: ORTHOPAEDIC SURGERY

## 2023-06-13 PROCEDURE — 63600175 PHARM REV CODE 636 W HCPCS: Performed by: PHYSICIAN ASSISTANT

## 2023-06-13 PROCEDURE — C1713 ANCHOR/SCREW BN/BN,TIS/BN: HCPCS | Performed by: ORTHOPAEDIC SURGERY

## 2023-06-13 PROCEDURE — 27201423 OPTIME MED/SURG SUP & DEVICES STERILE SUPPLY: Performed by: ORTHOPAEDIC SURGERY

## 2023-06-13 PROCEDURE — 36000711: Performed by: ORTHOPAEDIC SURGERY

## 2023-06-13 PROCEDURE — 64448 ADDUCTOR CANAL CATHETER: ICD-10-PCS | Mod: 59,RT,, | Performed by: ANESTHESIOLOGY

## 2023-06-13 PROCEDURE — 71000015 HC POSTOP RECOV 1ST HR: Performed by: ORTHOPAEDIC SURGERY

## 2023-06-13 PROCEDURE — 94761 N-INVAS EAR/PLS OXIMETRY MLT: CPT

## 2023-06-13 PROCEDURE — 25000003 PHARM REV CODE 250: Performed by: PHYSICIAN ASSISTANT

## 2023-06-13 PROCEDURE — D9220A PRA ANESTHESIA: Mod: CRNA,,, | Performed by: NURSE ANESTHETIST, CERTIFIED REGISTERED

## 2023-06-13 PROCEDURE — 37000009 HC ANESTHESIA EA ADD 15 MINS: Performed by: ORTHOPAEDIC SURGERY

## 2023-06-13 PROCEDURE — 25000003 PHARM REV CODE 250: Performed by: NURSE ANESTHETIST, CERTIFIED REGISTERED

## 2023-06-13 PROCEDURE — 29882 PR KNEE SCOPE,MED OR LAT MENIS REPAIR: ICD-10-PCS | Mod: 22,RT,, | Performed by: ORTHOPAEDIC SURGERY

## 2023-06-13 PROCEDURE — G0289 ARTHRO, LOOSE BODY + CHONDRO: HCPCS | Mod: RT,,, | Performed by: ORTHOPAEDIC SURGERY

## 2023-06-13 PROCEDURE — 63600175 PHARM REV CODE 636 W HCPCS: Performed by: NURSE ANESTHETIST, CERTIFIED REGISTERED

## 2023-06-13 PROCEDURE — 63600175 PHARM REV CODE 636 W HCPCS: Performed by: ORTHOPAEDIC SURGERY

## 2023-06-13 PROCEDURE — 25000003 PHARM REV CODE 250: Performed by: ORTHOPAEDIC SURGERY

## 2023-06-13 PROCEDURE — 36000710: Performed by: ORTHOPAEDIC SURGERY

## 2023-06-13 PROCEDURE — 25000003 PHARM REV CODE 250: Performed by: ANESTHESIOLOGY

## 2023-06-13 PROCEDURE — D9220A PRA ANESTHESIA: Mod: ANES,,, | Performed by: ANESTHESIOLOGY

## 2023-06-13 PROCEDURE — 71000033 HC RECOVERY, INTIAL HOUR: Performed by: ORTHOPAEDIC SURGERY

## 2023-06-13 PROCEDURE — D9220A PRA ANESTHESIA: ICD-10-PCS | Mod: CRNA,,, | Performed by: NURSE ANESTHETIST, CERTIFIED REGISTERED

## 2023-06-13 PROCEDURE — 25000003 PHARM REV CODE 250: Performed by: STUDENT IN AN ORGANIZED HEALTH CARE EDUCATION/TRAINING PROGRAM

## 2023-06-13 DEVICE — SYS NDL DELIVERY FAST FIX 360: Type: IMPLANTABLE DEVICE | Site: KNEE | Status: FUNCTIONAL

## 2023-06-13 RX ORDER — ONDANSETRON 2 MG/ML
INJECTION INTRAMUSCULAR; INTRAVENOUS
Status: DISCONTINUED | OUTPATIENT
Start: 2023-06-13 | End: 2023-06-13

## 2023-06-13 RX ORDER — PROPOFOL 10 MG/ML
VIAL (ML) INTRAVENOUS
Status: DISCONTINUED | OUTPATIENT
Start: 2023-06-13 | End: 2023-06-13

## 2023-06-13 RX ORDER — FENTANYL CITRATE 50 UG/ML
25-200 INJECTION, SOLUTION INTRAMUSCULAR; INTRAVENOUS
Status: DISCONTINUED | OUTPATIENT
Start: 2023-06-13 | End: 2023-06-13 | Stop reason: HOSPADM

## 2023-06-13 RX ORDER — BUPIVACAINE HYDROCHLORIDE 5 MG/ML
INJECTION, SOLUTION EPIDURAL; INTRACAUDAL
Status: DISCONTINUED | OUTPATIENT
Start: 2023-06-13 | End: 2023-06-13 | Stop reason: HOSPADM

## 2023-06-13 RX ORDER — SODIUM CHLORIDE 9 MG/ML
INJECTION, SOLUTION INTRAVENOUS CONTINUOUS
Status: DISCONTINUED | OUTPATIENT
Start: 2023-06-13 | End: 2023-06-13 | Stop reason: HOSPADM

## 2023-06-13 RX ORDER — FAMOTIDINE 10 MG/ML
INJECTION INTRAVENOUS
Status: DISCONTINUED | OUTPATIENT
Start: 2023-06-13 | End: 2023-06-13

## 2023-06-13 RX ORDER — LIDOCAINE HYDROCHLORIDE 20 MG/ML
INJECTION INTRAVENOUS
Status: DISCONTINUED | OUTPATIENT
Start: 2023-06-13 | End: 2023-06-13

## 2023-06-13 RX ORDER — METHOCARBAMOL 500 MG/1
500 TABLET, FILM COATED ORAL 4 TIMES DAILY
Status: DISCONTINUED | OUTPATIENT
Start: 2023-06-14 | End: 2023-06-13 | Stop reason: HOSPADM

## 2023-06-13 RX ORDER — ROPIVACAINE HYDROCHLORIDE 5 MG/ML
INJECTION, SOLUTION EPIDURAL; INFILTRATION; PERINEURAL
Status: COMPLETED | OUTPATIENT
Start: 2023-06-13 | End: 2023-06-13

## 2023-06-13 RX ORDER — FENTANYL CITRATE 50 UG/ML
25 INJECTION, SOLUTION INTRAMUSCULAR; INTRAVENOUS EVERY 5 MIN PRN
Status: DISCONTINUED | OUTPATIENT
Start: 2023-06-13 | End: 2023-06-13 | Stop reason: HOSPADM

## 2023-06-13 RX ORDER — ACETAMINOPHEN 500 MG
1000 TABLET ORAL
Status: COMPLETED | OUTPATIENT
Start: 2023-06-13 | End: 2023-06-13

## 2023-06-13 RX ORDER — MIDAZOLAM HYDROCHLORIDE 1 MG/ML
INJECTION, SOLUTION INTRAMUSCULAR; INTRAVENOUS
Status: DISCONTINUED | OUTPATIENT
Start: 2023-06-13 | End: 2023-06-13

## 2023-06-13 RX ORDER — ROPIVACAINE HYDROCHLORIDE 2 MG/ML
INJECTION, SOLUTION EPIDURAL; INFILTRATION; PERINEURAL CONTINUOUS
Status: DISCONTINUED | OUTPATIENT
Start: 2023-06-13 | End: 2023-06-13 | Stop reason: HOSPADM

## 2023-06-13 RX ORDER — EPINEPHRINE 1 MG/ML
INJECTION INTRAMUSCULAR; INTRAVENOUS; SUBCUTANEOUS
Status: DISCONTINUED | OUTPATIENT
Start: 2023-06-13 | End: 2023-06-13 | Stop reason: HOSPADM

## 2023-06-13 RX ORDER — OXYCODONE HYDROCHLORIDE 5 MG/1
5 TABLET ORAL
Status: DISCONTINUED | OUTPATIENT
Start: 2023-06-13 | End: 2023-06-13 | Stop reason: HOSPADM

## 2023-06-13 RX ORDER — HALOPERIDOL 5 MG/ML
0.5 INJECTION INTRAMUSCULAR EVERY 10 MIN PRN
Status: DISCONTINUED | OUTPATIENT
Start: 2023-06-13 | End: 2023-06-13 | Stop reason: HOSPADM

## 2023-06-13 RX ORDER — CELECOXIB 200 MG/1
400 CAPSULE ORAL ONCE
Status: COMPLETED | OUTPATIENT
Start: 2023-06-13 | End: 2023-06-13

## 2023-06-13 RX ORDER — MIDAZOLAM HYDROCHLORIDE 1 MG/ML
.5-4 INJECTION INTRAMUSCULAR; INTRAVENOUS
Status: DISCONTINUED | OUTPATIENT
Start: 2023-06-13 | End: 2023-06-13 | Stop reason: HOSPADM

## 2023-06-13 RX ORDER — VANCOMYCIN HYDROCHLORIDE 500 MG/10ML
INJECTION, POWDER, LYOPHILIZED, FOR SOLUTION INTRAVENOUS
Status: DISCONTINUED | OUTPATIENT
Start: 2023-06-13 | End: 2023-06-13 | Stop reason: HOSPADM

## 2023-06-13 RX ORDER — DEXAMETHASONE SODIUM PHOSPHATE 4 MG/ML
INJECTION, SOLUTION INTRA-ARTICULAR; INTRALESIONAL; INTRAMUSCULAR; INTRAVENOUS; SOFT TISSUE
Status: DISCONTINUED | OUTPATIENT
Start: 2023-06-13 | End: 2023-06-13

## 2023-06-13 RX ORDER — TRANEXAMIC ACID 100 MG/ML
INJECTION, SOLUTION INTRAVENOUS
Status: DISCONTINUED | OUTPATIENT
Start: 2023-06-13 | End: 2023-06-13

## 2023-06-13 RX ORDER — METHOCARBAMOL 500 MG/1
1000 TABLET, FILM COATED ORAL ONCE
Status: COMPLETED | OUTPATIENT
Start: 2023-06-13 | End: 2023-06-13

## 2023-06-13 RX ORDER — HYDROMORPHONE HYDROCHLORIDE 2 MG/ML
INJECTION, SOLUTION INTRAMUSCULAR; INTRAVENOUS; SUBCUTANEOUS
Status: DISCONTINUED | OUTPATIENT
Start: 2023-06-13 | End: 2023-06-13

## 2023-06-13 RX ORDER — FENTANYL CITRATE 50 UG/ML
INJECTION, SOLUTION INTRAMUSCULAR; INTRAVENOUS
Status: DISCONTINUED | OUTPATIENT
Start: 2023-06-13 | End: 2023-06-13

## 2023-06-13 RX ORDER — SODIUM CHLORIDE 0.9 % (FLUSH) 0.9 %
3 SYRINGE (ML) INJECTION
Status: DISCONTINUED | OUTPATIENT
Start: 2023-06-13 | End: 2023-06-13 | Stop reason: HOSPADM

## 2023-06-13 RX ORDER — KETAMINE HCL IN 0.9 % NACL 50 MG/5 ML
SYRINGE (ML) INTRAVENOUS
Status: DISCONTINUED | OUTPATIENT
Start: 2023-06-13 | End: 2023-06-13

## 2023-06-13 RX ADMIN — CELECOXIB 400 MG: 200 CAPSULE ORAL at 11:06

## 2023-06-13 RX ADMIN — PROPOFOL 200 MG: 10 INJECTION, EMULSION INTRAVENOUS at 01:06

## 2023-06-13 RX ADMIN — HYDROMORPHONE HYDROCHLORIDE 1 MG: 2 INJECTION, SOLUTION INTRAMUSCULAR; INTRAVENOUS; SUBCUTANEOUS at 03:06

## 2023-06-13 RX ADMIN — Medication 20 MG: at 01:06

## 2023-06-13 RX ADMIN — SODIUM CHLORIDE, SODIUM GLUCONATE, SODIUM ACETATE, POTASSIUM CHLORIDE, MAGNESIUM CHLORIDE, SODIUM PHOSPHATE, DIBASIC, AND POTASSIUM PHOSPHATE: .53; .5; .37; .037; .03; .012; .00082 INJECTION, SOLUTION INTRAVENOUS at 01:06

## 2023-06-13 RX ADMIN — FENTANYL CITRATE 50 MCG: 50 INJECTION, SOLUTION INTRAMUSCULAR; INTRAVENOUS at 12:06

## 2023-06-13 RX ADMIN — CEFAZOLIN 2 G: 2 INJECTION, POWDER, FOR SOLUTION INTRAMUSCULAR; INTRAVENOUS at 01:06

## 2023-06-13 RX ADMIN — MIDAZOLAM HYDROCHLORIDE 1 MG: 1 INJECTION, SOLUTION INTRAMUSCULAR; INTRAVENOUS at 01:06

## 2023-06-13 RX ADMIN — ROPIVACAINE HYDROCHLORIDE 10 ML: 5 INJECTION EPIDURAL; INFILTRATION; PERINEURAL at 12:06

## 2023-06-13 RX ADMIN — FAMOTIDINE 20 MG: 10 INJECTION, SOLUTION INTRAVENOUS at 01:06

## 2023-06-13 RX ADMIN — HYDROMORPHONE HYDROCHLORIDE 0.2 MG: 2 INJECTION, SOLUTION INTRAMUSCULAR; INTRAVENOUS; SUBCUTANEOUS at 03:06

## 2023-06-13 RX ADMIN — OXYCODONE HYDROCHLORIDE 5 MG: 5 TABLET ORAL at 04:06

## 2023-06-13 RX ADMIN — METHOCARBAMOL 1000 MG: 500 TABLET ORAL at 04:06

## 2023-06-13 RX ADMIN — MIDAZOLAM HYDROCHLORIDE 1 MG: 1 INJECTION, SOLUTION INTRAMUSCULAR; INTRAVENOUS at 12:06

## 2023-06-13 RX ADMIN — HYDROMORPHONE HYDROCHLORIDE 0.4 MG: 2 INJECTION, SOLUTION INTRAMUSCULAR; INTRAVENOUS; SUBCUTANEOUS at 03:06

## 2023-06-13 RX ADMIN — ONDANSETRON 4 MG: 2 INJECTION, SOLUTION INTRAMUSCULAR; INTRAVENOUS at 03:06

## 2023-06-13 RX ADMIN — SODIUM CHLORIDE: 9 INJECTION, SOLUTION INTRAVENOUS at 11:06

## 2023-06-13 RX ADMIN — TRANEXAMIC ACID 1000 MG: 100 INJECTION, SOLUTION INTRAVENOUS at 01:06

## 2023-06-13 RX ADMIN — FENTANYL CITRATE 100 MCG: 50 INJECTION, SOLUTION INTRAMUSCULAR; INTRAVENOUS at 01:06

## 2023-06-13 RX ADMIN — ACETAMINOPHEN 1000 MG: 500 TABLET ORAL at 11:06

## 2023-06-13 RX ADMIN — MIDAZOLAM HYDROCHLORIDE 2 MG: 1 INJECTION, SOLUTION INTRAMUSCULAR; INTRAVENOUS at 12:06

## 2023-06-13 RX ADMIN — Medication: at 04:06

## 2023-06-13 RX ADMIN — LIDOCAINE HYDROCHLORIDE 100 MG: 20 INJECTION INTRAVENOUS at 01:06

## 2023-06-13 RX ADMIN — DEXAMETHASONE SODIUM PHOSPHATE 8 MG: 4 INJECTION, SOLUTION INTRAMUSCULAR; INTRAVENOUS at 01:06

## 2023-06-13 NOTE — ANESTHESIA PROCEDURE NOTES
Intubation    Date/Time: 6/13/2023 1:10 PM  Performed by: Jesus Fernandez CRNA  Authorized by: Wilber Dai MD     Intubation:     Induction:  Intravenous    Intubated:  Postinduction    Mask Ventilation:  Easy mask    Attempts:  1    Attempted By:  CRNA    Difficult Airway Encountered?: No      Complications:  None    Airway Device:  Supraglottic airway/LMA    Airway Device Size:  4.5    Style/Cuff Inflation:  Cuffed    Secured at:  The lips    Placement Verified By:  Capnometry    Complicating Factors:  None    Findings Post-Intubation:  BS equal bilateral and atraumatic/condition of teeth unchanged

## 2023-06-13 NOTE — ANESTHESIA PREPROCEDURE EVALUATION
06/13/2023  Royal Martinez II is a 22 y.o., male.      Pre-op Assessment    I have reviewed the Patient Summary Reports.     I have reviewed the Nursing Notes. I have reviewed the NPO Status.   I have reviewed the Medications.     Review of Systems  Anesthesia Hx:  No previous Anesthesia  Neg history of prior surgery.   Social:  Non-Smoker, No Alcohol Use    Hematology/Oncology:  Hematology Normal   Oncology Normal     EENT/Dental:   chronic allergic rhinitis Regular astigmatism of both eyes   Cardiovascular:  Cardiovascular Normal Exercise tolerance: good   Denies VOGEL.    Pulmonary:   Asthma asymptomatic Denies Shortness of breath. Childhood asthma   Renal/:  Renal/ Normal  Denies Chronic Renal Disease.  Circumcision   Hepatic/GI:  Hepatic/GI Normal  Denies GERD. Denies Liver Disease.    Musculoskeletal:   Acute lateral meniscus tear of right knee,  Patellar Tendinitis of Left Knee   Neurological:  Neurology Normal  Denies Headaches. Denies Seizures.    Endocrine:   Denies Diabetes. Denies Hypothyroidism.  Obesity / BMI > 30  Dermatological:   Acanthosis nigricans   Psych:  Psychiatric Normal           Physical Exam  General: Well nourished and Cooperative    Airway:  Mallampati: II   Mouth Opening: Normal  TM Distance: Normal  Tongue: Normal    Dental:  Intact    Chest/Lungs:  Normal Respiratory Rate, Clear to auscultation    Heart:  Rhythm: Regular Rhythm  Sounds: Normal      Past Medical History:   Diagnosis Date    ALLERGIC RHINITIS     Allergic rhinitis 11/23/2020    Asthma, currently inactive     Childhood asthma 11/23/2020    Obesity     Overweight(278.02)     Patellar tendonitis of left knee      Past Surgical History:   Procedure Laterality Date    CIRCUMCISION         Anesthesia Assessment: Preoperative EQUATION    Planned Procedure: Procedure(s) (LRB):  ARTHROSCOPY,KNEE,WITH MENISCUS  REPAIR, LATERAL (Right)  Requested Anesthesia Type:General  Surgeon: ALEXANDER Emanuel MD  Service: Orthopedics  Known or anticipated Date of Surgery:6/12/2023    Surgeon notes: reviewed    Electronic QUestionnaire Assessment completed via nurse interview with patient.        Triage considerations:     The patient has no apparent active cardiac condition (No unstable coronary Syndrome such as severe unstable angina or recent [<1 month] myocardial infarction, decompensated CHF, severe valvular   disease or significant arrhythmia)    Previous anesthesia records:None    Last PCP note: 3-6 months ago , within [facility]       Other important co-morbidities: Obesity        EKG 11/23/2020:  Vent. Rate : 077 BPM     Atrial Rate : 077 BPM      P-R Int : 174 ms          QRS Dur : 098 ms       QT Int : 384 ms       P-R-T Axes : 062 063 035 degrees      QTc Int : 434 ms   Normal sinus rhythm   Early repolarization   Normal ECG   When compared with ECG of 2001 09:09,   Voltage in the precordial leads has increased   Axis is now normal   Confirmed by Bryce Burr MD ([id number]) on 11/23/2020 4:34:20 PM               Instructions given. (See in Nurse's note)      Ht: 6'2  Wt: 248 lb  BMI: 31.84  VAccinated      Anesthesia Plan  Type of Anesthesia, risks & benefits discussed:    Anesthesia Type: Gen ETT, Regional  Intra-op Monitoring Plan: Standard ASA Monitors  Post Op Pain Control Plan: multimodal analgesia, IV/PO Opioids PRN and peripheral nerve block  Induction:  IV  Informed Consent: Informed consent signed with the Patient and all parties understand the risks and agree with anesthesia plan.  All questions answered.   ASA Score: 1    Ready For Surgery From Anesthesia Perspective.       .

## 2023-06-13 NOTE — PLAN OF CARE
Pre op complete. Patient resting comfortably. Call light in reach. Mother to bedside shortly, will take belongings. Waiting on anesthesia consent, site braxton, and H&P update. Patient needs crutches for discharge/home use.

## 2023-06-13 NOTE — ANESTHESIA POSTPROCEDURE EVALUATION
Anesthesia Post Evaluation    Patient: Royal Martinez II    Procedure(s) Performed: Procedure(s) (LRB):  ARTHROSCOPY KNEE ,WITH MENISCUS REPAIR (Right)  ARTHROSCOPY, KNEE, WITH CHONDROPLASTY (Right)    Final Anesthesia Type: general      Patient location during evaluation: PACU  Patient participation: Yes- Able to Participate  Level of consciousness: awake and alert  Post-procedure vital signs: reviewed and stable  Pain management: adequate  Airway patency: patent    PONV status at discharge: No PONV  Anesthetic complications: no      Cardiovascular status: blood pressure returned to baseline  Respiratory status: unassisted  Hydration status: euvolemic  Follow-up not needed.          Vitals Value Taken Time   /63 06/13/23 1701   Temp 36.7 °C (98 °F) 06/13/23 1613   Pulse 90 06/13/23 1714   Resp 13 06/13/23 1714   SpO2 96 % 06/13/23 1714   Vitals shown include unvalidated device data.      No case tracking events are documented in the log.      Pain/Arleen Score: Pain Rating Prior to Med Admin: 3 (6/13/2023  4:33 PM)  Arleen Score: 9 (6/13/2023  4:13 PM)

## 2023-06-13 NOTE — OP NOTE
[facility]   OPERATIVE REPORT   ORTHOPAEDIC SURGERY   PROVIDER: DR. NEERAJ STEWART    PATIENT INFORMATION   Royal Martinez II 22 y.o. male 2000   MRN: [id number]   LOCATION: [facility]     DATE OF PROCEDURE: 6/13/2023     PREOPERATIVE DIAGNOSES:   Right knee chronic lateral meniscus tear, complex  Right knee chondromalacia  Right knee genu valgum  Right knee medial synovial plica     POSTOPERATIVE DIAGNOSES:   Right knee chronic lateral meniscus tear, complex  Right knee chondromalacia  Right knee genu valgum  Right knee medial synovial plica     PROCEDURES PERFORMED:   Right knee arthroscopic combined inside-out and all inside lateral meniscus repair, complex (CPT 40374-92)  Right knee arthroscopic chondroplasty (patella, lateral femoral condyle, lateral tibial plateau) (CPT 34341)  Right knee arthroscopic limited notchplasty  Right knee arthroscopic plica excision     COMPLEX PROCEDURE:    This was a more complex than usual meniscus repair given the complex multiplanar tear pattern of the lateral meniscus.  There were significant signs of chronicity and the tear extended from the otherwise intact posterior root to the anterior body.  This was an extensive tear that required a combined approach using multiple techniques.  Additional time and implants were needed in the operating room given the large size of the tear in the extensive nature of the pathology.     Surgeon(s) and Role:     * ALEXANDER Stewart MD - Primary Surgeon     * Lionel Oro MD - Resident     ANESTHESIA: General with adductor block     ESTIMATED BLOOD LOSS: 45 cc    IMPLANTS:   Implant Name Type Inv. Item Serial No.  Lot No. LRB No. Used Action   SYS NDL DELIVERY FAST  - [id number]  SYS NDL DELIVERY FAST   [employer] [id number] Right 3 Implanted   SYS NDL DELIVERY FAST  - [id number]  SYS NDL DELIVERY FAST   [employer] [id number] Right 2 Implanted   SYS NDL DELIVERY FAST   - [id number]  [facility] NDL DELIVERY FAST   [employer] [id number] Right 2 Implanted and Explanted      FINDINGS: This was a significantly complex and degenerative lateral meniscus tear extending from the posterior root to the anterior body.  Tissue quality was overall fairly poor.  There was an undersurface horizontal cleavage tear with a radial undersurface tear component with complete displacement.  This portion was non repairable.  In addition there was a separate vertical longitudinal tear of the superior leaflet that extended from the posterior horn to the anterior body.  There was a near complete radial tear of that segment adjacent to the otherwise intact posterior root.  Again tissue quality was quite compromised and there was significant signs of chronicity here.  There was a 25 x 20 mm area of grade 3 chondromalacia of the lateral femoral condyle.  Fairly diffuse grade 2 wear of the lateral tibial plateau.  Small area of grade 2 chondromalacia involving the central eminence of the patella with some lateral patellar maltracking.  Otherwise the articular cartilage elsewhere the knee was intact.  There was no medial meniscus tear.  The cruciate ligaments were normal.    SPECIMENS: None.    COMPLICATIONS: None.     INTRAOPERATIVE COUNTS: Correct.     PROPHYLACTIC IV ANTIBIOTICS: Given per [facility] Protocol.    INDICATIONS FOR PROCEDURE: Royal recently presented to me with a three-month history of right knee pain.  Recent exam and imaging has shown a complex significant lateral meniscus tear with underlying lateral compartment chondromalacia and genu valgum alignment.  The patient has been indicated for arthroscopy.  Full informed consent was obtained prior to proceeding.    DETAILS OF PROCEDURE: After the operative site was identified and marked, informed consent for the procedure was once again obtained today. The patient was then transferred to the OR and placed supine on the table. General anesthesia was  administered. Examination under anesthesia of the right knee demonstrated normal ligamentous stability and full passive range of motion. An arthroscopic leg camacho was utilized and the right lower extremity was prepped and draped in the usual sterile fashion. All bony prominences were well padded. The contralateral lower extremity was supported in a stirrup.     Verbal timeout was confirmed to identify the correct operative site, patient identity and planned operative procedure.     The anterolateral portal was position a bit more lateral than usual for access.  The anteromedial portal was positioned in standard fashion. The scope was introduced and diagnostic arthroscopy was performed.  Findings were documented as above.  This was an extremely complex and degenerative type tear, multi planer.  Tissue quality was poor.  Care was taken to preserve as much meniscal tissue as possible.  Nonetheless there were segments of this tear that were not repairable.  The arthroscopic shaver and biter devices were utilized to remove the undersurface component with the radial tear extension.  The upper leaf had a longitudinal tear through the capsular junction which was unstable.  This upper leaf portion also had a near complete radial tear adjacent to the otherwise intact posterior root.  The arthroscopic shaver was used to gently debride both sides of the tear.  A ball rasp also was utilized to abrasively prep the repair site.  A spinal needle was used for trephination.  The arthroscopic shaver was introduced to perform a limited chondroplasty over the lateral tibial plateau, lateral femoral condyle.  All unstable flaps of torn articular cartilage were removed.  Additionally as noted above, the arthroscopic shaver was utilized to perform chondroplasty over the patella.  The medial synovial plica also was taken down and care was taken to maintain hemostasis throughout.  Attention was turned back towards the lateral meniscus  repair.  Given the complexity to the tear was felt that an inside-out approach would be best in this case.     A total of 5 [employer]hew all-inside 360 fixators were placed over the posterior aspect of the tear. These were placed in a combined vertical mattress, circumferential hay bale, and side to side fashion. The side to side fixator was placed across the posterior, root equivalent, radial tear portion.  An inside-out approach was also utilized in this case.  Dissection was carried down to the interval between the biceps femoris in the ITB band to gain access to the posterolateral capsule.  A spoon was used to protect the posterior neurovascular structures after mobilization of the lateral gastrocnemius musculature.  A total of 9 Arthrex inside-out suture tape needles were placed also in vertical mattress fashion over the femoral and tibial surfaces of the tear for repair of the posterior horn to anterior body segments of the tear. Some sutures were placed in circumferential hay bale configuration given the poor tissue quality.  These sutures were then tied carefully to secure the remainder of the tear. There were a total of 14 suture fixators or suture pairs placed to complete this repair construct.  A probe was introduced afterwards to demonstrate good stability of the remainder of the meniscus.  Good tissue apposition was achieved.  Again care was taken to preserve as much meniscal tissue as possible in this case.  Unfortunately this appeared to be a chronic tear to some extent with lateral compartment breakdown.  This is an at risk knee.     A bur was then used to perform a limited notchplasty anterior to the intact ACL insertion on the medial wall of the lateral femoral condyle.  Bleeding from this area was confirmed. This was performed to augment the healing environment biologically. Incisions were closed in standard fashion and sterile dressings applied. The knee was placed in a T-scope brace set from  0-90 degrees and locked in full hyperextension. A thigh-high SHAMAR hose was also placed. The patient was then extubated and transferred to PACU in stable condition.     POSTOPERATIVE PLAN: TTWB x 6 weeks with the brace locked in extension given the size of the tear and the repair required. Flexion 0-90 degrees x 6 weeks. ASA per protocol x 2 weeks for DVT prophylaxis. I'll see the patient back in my clinic in 2 weeks. PT to start this week. Will transition to a lateral  brace after week 6, which he will wear through week 12 to offload the repair site.

## 2023-06-13 NOTE — PLAN OF CARE
Patient is AAO and VSS.  Tolerating PO and states pain is tolerable.  Dressing CDI.  Patient states they are ready for d/c.  IV removed.  Catheter tip intact.  Caregiver at bedside.  Discharge instructions reviewed and copy given to the patient and caregiver.  Questions answered.  Both verbalized understanding.  Medication delivered to bedside. .  crutches to bedside. Nimbus taught to patient and mother who verbalize understanding. Patient wheeled to car by RN/PCT.

## 2023-06-13 NOTE — TRANSFER OF CARE
"Anesthesia Transfer of Care Note    Patient: Royal Martinez II    Procedure(s) Performed: Procedure(s) (LRB):  ARTHROSCOPY KNEE ,WITH MENISCUS REPAIR (Right)  ARTHROSCOPY, KNEE, WITH CHONDROPLASTY (Right)    Patient location: PACU    Anesthesia Type: general    Transport from OR: Transported from OR on 6-10 L/min O2 by face mask with adequate spontaneous ventilation    Post pain: adequate analgesia    Post assessment: no apparent anesthetic complications    Post vital signs: stable    Level of consciousness: awake and alert    Nausea/Vomiting: no nausea/vomiting    Complications: none    Transfer of care protocol was followed      Last vitals:   Visit Vitals  /61 (BP Location: Left arm, Patient Position: Lying)   Pulse (!) 58   Temp 37.1 °C (98.8 °F) (Tympanic)   Resp 11   Ht 6' 2" (1.88 m)   Wt 104.3 kg (230 lb)   SpO2 99%   BMI 29.53 kg/m²     "

## 2023-06-13 NOTE — ANESTHESIA PROCEDURE NOTES
Adductor canal catheter    Patient location during procedure: pre-op   Block not for primary anesthetic.  Reason for block: at surgeon's request and post-op pain management   Post-op Pain Location: right knee pain   Start time: 6/13/2023 12:52 PM  Timeout: 6/13/2023 12:50 PM   End time: 6/13/2023 12:58 PM    Staffing  Authorizing Provider: Cheryl Byrne MD  Performing Provider: Cheryl Byrne MD    Preanesthetic Checklist  Completed: patient identified, IV checked, site marked, risks and benefits discussed, surgical consent, monitors and equipment checked, pre-op evaluation and timeout performed  Peripheral Block  Patient position: supine  Prep: ChloraPrep and site prepped and draped  Patient monitoring: heart rate, cardiac monitor, continuous pulse ox, continuous capnometry and frequent blood pressure checks  Block type: adductor canal  Laterality: right  Injection technique: continuous  Needle  Needle type: Tuohy   Needle gauge: 17 G  Needle length: 3.5 in  Needle localization: anatomical landmarks and ultrasound guidance  Catheter type: spring wound  Catheter size: 19 G  Test dose: lidocaine 1.5% with Epi 1-to-200,000 and negative   -ultrasound image captured on disc.  Assessment  Injection assessment: negative aspiration, negative parasthesia and local visualized surrounding nerve  Paresthesia pain: none  Heart rate change: no  Slow fractionated injection: yes    Medications:    Medications: ropivacaine (NAROPIN) injection 0.5% - Perineural   10 mL - 6/13/2023 12:55:00 PM    Additional Notes  VSS.  [facility] RN monitoring vitals throughout procedure.  Patient tolerated procedure well.     20 cc of 0.25% ropivacaine with 1/300k epi used as local

## 2023-06-14 NOTE — ANESTHESIA POST-OP PAIN MANAGEMENT
"Acute Pain Service Progress Note    6/14/2023 1546    Patient contacted regarding [facility] infusion follow up. Reports that pain has been well controlled with the infusion pump. Denies signs of local anesthetic toxicity. PNC dressing remains clean, dry, and intact. All questions answered. Reminded patient that the infusion should be discontinued and PNC removed whenever the "infusion complete" alert is seen on the display screen or by POD 5 (6/18/23) at 12pm, whichever comes first. Encouraged patient to call the [facility] 24/7 support line at [phone] or the [facility] line at [phone] for any [facility] related questions/issues. Verbalizes understanding. Will continue to follow up.        "

## 2023-06-14 NOTE — DISCHARGE SUMMARY
[facility])  Orthopedics  Discharge Summary      Patient Name: Royal aMrtinez II  MRN: [id number]  Admission Date: 6/13/2023  Hospital Length of Stay: 0 days  Discharge Date and Time: 6/13/2023  5:31 PM  Attending Physician: Tessa att. providers found   Discharging Provider: Lionel Oro MD  Primary Care Provider: Lety Grande MD    HPI: Royal Martinez II  is here for a completion of his perioperative paperwork. he  Is scheduled to undergo  Right knee arthroscopic lateral meniscus repair versus partial meniscectomy as indicated on 6/12/2023.  He is a healthy individual and does not need clearance for this procedure.      Risks, indications and benefits of the surgical procedure were discussed with the patient. All questions with regard to surgery, rehab, expected return to functional activities, activities of daily living and recreational endeavors were answered to his satisfaction.     Discussed COVID-19 with the patient, they are aware of our current policies and procedures, were given the option of delaying surgery, and they elect to proceed.     Patient was informed and understands the risks of surgery are greater for patients with a current condition or hx of heart disease, obesity, clotting disorders, recurrent infections, steroid use, current or past smoking, and factors such as sedentary lifestyle and noncompliance with medications, therapy or f/u. The degree of the increased risk is hard to estimate w/ any degree of precision.     Once no other questions were asked, a brief history and physical exam was then performed.    Procedure(s) (LRB):  ARTHROSCOPY KNEE ,WITH MENISCUS REPAIR (Right)  ARTHROSCOPY, KNEE, WITH CHONDROPLASTY (Right)      Hospital Course: Patient presented for above procedure.  Tolerated it well and was discharged home POD0 after voiding, tolerating diet, ambulating, pain controlled.  Discharge instructions, follow-up appointment, and med rec are below.          Significant  "Diagnostic Studies: Labs: All labs within the past 24 hours have been reviewed    Pending Diagnostic Studies:       None          There are no hospital problems to display for this patient.     Discharged Condition: good    Disposition: Home or Self Care    Follow Up:    Patient Instructions:      CRUTCHES FOR HOME USE     Order Specific Question Answer Comments   Type: Axillary    Height: 6' 2" (1.88 m)    Weight: 109.8 kg (242 lb)    Length of need (1-99 months): 3      Diet Adult Regular     Other restrictions (specify):   Order Comments: In hinged knee brace locked in -10 degrees hyperextention     Leave dressing on - Keep it clean, dry, and intact until clinic visit     Notify your health care provider if you experience any of the following:  temperature >100.4     Notify your health care provider if you experience any of the following:  persistent nausea and vomiting or diarrhea     Notify your health care provider if you experience any of the following:  severe uncontrolled pain     Notify your health care provider if you experience any of the following:  redness, tenderness, or signs of infection (pain, swelling, redness, odor or green/yellow discharge around incision site)     Notify your health care provider if you experience any of the following:  difficulty breathing or increased cough     Notify your health care provider if you experience any of the following:  severe persistent headache     Notify your health care provider if you experience any of the following:  worsening rash     Notify your health care provider if you experience any of the following:  persistent dizziness, light-headedness, or visual disturbances     Notify your health care provider if you experience any of the following:  increased confusion or weakness     Weight bearing restrictions (specify):   Order Comments: TTWB RLE in hinged knee brace     Medications:  Reconciled Home Medications:      Medication List        CONTINUE taking " these medications      aspirin 81 MG EC tablet  Commonly known as: ECOTRIN  Take 1 tablet (81 mg total) by mouth 2 (two) times a day. for 14 days     ondansetron 4 MG Tbdl  Commonly known as: ZOFRAN-ODT  Take 1 tablet (4 mg total) by mouth every 8 (eight) hours as needed (nausea).     oxyCODONE 5 MG immediate release tablet  Commonly known as: ROXICODONE  Take 1-2 tablets (5-10 mg total) by mouth every 4 to 6 hours as needed for Pain.            ASK your doctor about these medications      fluticasone propionate 50 mcg/actuation nasal spray  Commonly known as: FLONASE  1 spray (50 mcg total) by Each Nostril route 2 (two) times daily as needed for Rhinitis (nasal congestion).     ibuprofen 800 MG tablet  Commonly known as: ADVIL,MOTRIN  Take 1 tablet (800 mg total) by mouth every 6 (six) hours as needed for Pain.              Lionel Oro MD  Orthopedics  [facility])

## 2023-06-16 ENCOUNTER — CLINICAL SUPPORT (OUTPATIENT)
Dept: REHABILITATION | Facility: HOSPITAL | Age: 23
End: 2023-06-16
Payer: COMMERCIAL

## 2023-06-16 DIAGNOSIS — M25.661 DECREASED RANGE OF MOTION (ROM) OF RIGHT KNEE: ICD-10-CM

## 2023-06-16 DIAGNOSIS — M62.81 QUADRICEPS WEAKNESS: ICD-10-CM

## 2023-06-16 DIAGNOSIS — S83.271A COMPLEX TEAR OF LATERAL MENISCUS OF RIGHT KNEE, UNSPECIFIED WHETHER OLD OR CURRENT TEAR, INITIAL ENCOUNTER: ICD-10-CM

## 2023-06-16 PROCEDURE — 97110 THERAPEUTIC EXERCISES: CPT | Performed by: PHYSICAL THERAPIST

## 2023-06-16 PROCEDURE — 97161 PT EVAL LOW COMPLEX 20 MIN: CPT | Performed by: PHYSICAL THERAPIST

## 2023-06-16 NOTE — PLAN OF CARE
[facility]  Physical Therapy Initial Evaluation    Name: Royal Martinez II  Clinic Number: [id number]    Therapy Diagnosis:   Encounter Diagnoses   Name Primary?    Complex tear of lateral meniscus of right knee, unspecified whether old or current tear, initial encounter     Decreased range of motion (ROM) of right knee     Quadriceps weakness      Physician: Rossy Patton*    Physician Orders: PT Eval and Treat  Medical Diagnosis from Referral: Complex tear of lateral meniscus of right knee, unspecified whether old or current tear, initial encounter [S83.271A]  Evaluation Date: 6/16/2023  Authorization Period Expiration: 12/31/2023  Plan of Care Expiration: 12/31/2023  Visit # / Visits authorized: 1/ Eval    Time In: 1500  Time Out: 1615  Total Billable Time: 90 minutes    DOS: 6/13/23    S/p: Right knee arthroscopic combined inside-out and all inside lateral meniscus repair, complex (CPT 58053-25)  Right knee arthroscopic chondroplasty (patella, lateral femoral condyle, lateral tibial plateau) (CPT 26027)  Right knee arthroscopic limited notchplasty  Right knee arthroscopic plica excision    Post-Op Precautions: TTWB x 6 weeks with the brace locked in extension given the size of the tear and the repair required. Flexion 0-90 degrees x 6 weeks. ASA per protocol x 2 weeks for DVT prophylaxis. I'll see the patient back in my clinic in 2 weeks. PT to start this week. Will transition to a lateral  brace after week 6, which he will wear through week 12 to offload the repair site.     Precautions: Standard and Elevated BP reading w/o dx of hypertension    Subjective     Date of onset: 6/13/2023  History of current condition - Royal reports: suffering knee injury after landing from a jump while playing basketball and hearing a pop with associated increased pain and swelling. Pt elected to receive the above mentioned surgical procedure. Pt denies current pain 2/2 to intact pain pump  and pain medication compliance. Pt denies current N&T and radiating sx. Pt denies constitutional signs and sx and sx of infection.     Imaging   MRI KNEE WITHOUT CONTRAST RIGHT     CLINICAL HISTORY:  Meniscal tear, untreated, new symptoms;Other tear of lateral meniscus, current injury, right knee, initial encounter     TECHNIQUE:  MR right knee performed multiplanar T1, proton density, T2, and STIR weighted sequences.     COMPARISON:  Right knee x-ray 04/06/2023.     FINDINGS:  There is extensive peripheral longitudinal tear involving the lateral meniscus, body and posterior horn, involving the inferior articular surface.     There is additional horizontal tear component extending through the superior and inferior articular surfaces, near the free edge posterior horn lateral meniscus, towards the posterior junctional zone.     Medial meniscus, collateral ligaments, and cruciate ligaments maintain normal signal intensity morphology.     The extensor mechanism is intact.  There is a large knee joint effusion.  There is a thickened medial patella plica noted.     The chondral surfaces are well preserved.     The bone marrow signal intensity is normal.     There is no soft tissue swelling.     Impression:     1. Extensive lateral meniscal tear involving peripheral inferior articular surface contiguously involving body and posterior horn.  There is a separate oblique horizontal tear posterior horn lateral meniscus involving superior and inferior articular surfaces.  2. Large knee joint effusion.  Thickened medial patella plica.        Electronically signed by: Tariq Power MD  Date:                                            04/13/2023  Time:                                           16:54    XR KNEE ORTHO RIGHT WITH FLEXION     CLINICAL HISTORY:  Pain in right knee     TECHNIQUE:  AP standing views of both knees, AP flexion views of both knees, lateral view of the right knee and Merchant views of both knees      COMPARISON:  12/07/2017     FINDINGS:  The joint spaces of all 3 compartments of the right knee are well maintained.  No joint effusion.  No acute fracture or dislocation.     Impression:     1.  As above        Electronically signed by: Jefe Edouard DO  Date:                                            04/06/2023  Time:                                           14:03    XR HIP TO ANKLE     CLINICAL HISTORY:  Other tear of lateral meniscus, current injury, right knee, initial encounter     TECHNIQUE:  AP view of lower extremities with metal ruler vertically situated between legs.     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  Minimal mild DJD medial right ankle joint mortise.  Evidence of some foreshortening left femoral length and/or limited elevation left hemipelvis.     Impression:     See results above.        Electronically signed by: Cem Tyler MD  Date:                                            05/23/2023  Time:                                           14:58    Prior Therapy: Not for current condition  Exercise Routine/Sport Participation: Recreational basketball, resistance training  Social History: Staying with parents over summer  Occupation: Student at [employer]  Prior Level of Function: Independent with ADLs/IADLs, and recreational and fitness activities  Current Level of Function: Limited with ADLs/IADLs, and recreational and fitness activities    Pain:  Current: Pt denies current pain 2/2 to intact pain pump and pain medication compliance (pt verbalized 3/10 pain with bending and heel prop)  Location: right knee  Description: Stiff, achy  Aggravating Factors: Sitting, Standing, Bending, Walking, Extension, Flexing, and Getting out of bed/chair  Easing Factors: pain medication, ice, and rest    Pts goals: Return to basketball and weightlifting      Medical History:   Past Medical History:   Diagnosis Date     ALLERGIC RHINITIS     Allergic rhinitis 11/23/2020    Asthma, currently inactive     Childhood asthma 11/23/2020    Obesity     Overweight(278.02)     Patellar tendonitis of left knee        Surgical History:   Royal Martinez II  has a past surgical history that includes Circumcision; arthroscopy,knee,with meniscus repair (Right, 6/13/2023); and Arthroscopic chondroplasty of knee joint (Right, 6/13/2023).    Medications:   Royal has a current medication list which includes the following prescription(s): aspirin, fluticasone propionate, ibuprofen, ondansetron, and oxycodone.    Allergies:   Review of patient's allergies indicates:  No Known Allergies     Objective     Observation: Pt presents A&O x 3 with bandages intact, ACE wrap, SHAAMR hose, and T-scope brace donned    Gait: Pt ambulates NWB with B Aux crutches using a 3-point gait swing through gait pattern      Knee Passive Range of Motion:   Right  Left    Flexion 30 135   Extension Lacking 5 5       Ankle Active Range of Motion: Comparable to unaffected LE      Quad Set: Able to perform trace quad set with practice      Joint Mobility: Unable to formally assess 2/2 to bandages donned       Palpation: TTP inferior and lateral knee through bandaging      Sensation: Intact      Pulses: Intact      Edema:  Visible edema through bandaging. Unable to formally assess 2/2 to bandages donned    Special Tests: Not performed today due to post-op status   Strength: Not performed today due to post-op status.        CMS Impairment/Limitation/Restriction for FOTO Knee Survey    Therapist reviewed FOTO scores for Royal Martinez II on 6/16/2023.   FOTO documents entered into [employer] - see Media section.    Limitation Score: 44%         Treatment     Treatment Time In: 1500  Treatment Time Out: 1630  Total Treatment time separate from Evaluation: 30 minutes     Royal received the treatments listed below:      All units billed as There-ex as per Medicaid rules    Therapeutic exercises to  develop strength and ROM for 30 minutes including:  Heel prop 10' pre and post  Heel slides x 20 w/ 2-3s holds (pt educated not to push past first resistance barrier 2/2 to pain pump and acute post-op status)  Quad set x 10 w/ 10s holds (multiple bouts)  Ankle pumps x 20 (education on use of edema management  T-scope brace adjustment  Education on TTWB during ambulation        Home Exercises and Patient Education Provided     Education provided:   - As mentioned above  - Prognosis, activity modification, goals for therapy, role of therapy for care, exercises/HEP    Written Home Exercises Provided: Yes.  Exercises were reviewed and Royal was able to demonstrate them prior to the end of the session.   Pt received a written copy of exercises to perform at home. Royal demonstrated good  understanding of the education provided.     See EMR under patient instructions for exercises given.     Assessment     Royal is a 22 y.o. male referred to outpatient Physical Therapy with a medical dx of Complex tear of lateral meniscus of right knee, unspecified whether old or current tear, initial encounter [S83.370A], s/p the procedures mentioned above. Pt presents with increased pain and swelling, decreased knee ROM and strength, healing incision sites with limitations in ADLs/IADLs, transfers, ambulation, and stair negotiation with limited ability to participate in work, educational, recreational, and health-related activities.      Pt will benefit from skilled outpatient Physical Therapy to address the deficits stated above and in the chart below, provide pt/family education, and to maximize pt's level of independence. Pt prognosis is Excellent.     Plan of care discussed with patient: Yes  Pt's spiritual, cultural and educational needs considered and patient is agreeable to the plan of care and goals as stated below:       Anticipated Barriers for therapy: None      Medical Necessity is demonstrated by the  following  History  Co-morbidities and personal factors that may impact the plan of care Co-morbidities:   None    Personal Factors:   no deficits     low   Examination  Body Structures and Functions, activity limitations and participation restrictions that may impact the plan of care Body Regions:   lower extremities    Body Systems:    gross symmetry  ROM  strength  gross coordinated movement  balance  gait  transfers  transitions  motor control  edema  scar formation  skin integrity    Participation Restrictions:   Work, educational, recreational, and health-related activities     Activity limitations:   Learning and applying knowledge  no deficits    General Tasks and Commands  no deficits    Communication  no deficits    Mobility  lifting and carrying objects  walking  driving (bike, car, motorcycle)    Self care  washing oneself (bathing, drying, washing hands)  caring for body parts (brushing teeth, shaving, grooming)  toileting  dressing    Domestic Life  shopping  cooking  doing house work (cleaning house, washing dishes, laundry)  assisting others    Interactions/Relationships  no deficits    Life Areas  employment    Community and Social Life  community life  recreation and leisure         moderate   Clinical Presentation stable and uncomplicated low   Decision Making/ Complexity Score: low      Goals:  Short Term Goals: 8-10 weeks  1. Pt will be compliant with HEP 50% of prescribed amount.   2. The pt to demo improvement in R knee ROM to equal L knee ROM pain free   3.  The pt to demo good quad set with proper hyperextension moment of the R knee   4. The pt to demo ambualting with elast restricitve AD witout major compensatory pattern R knee for at least 20 feet      Long Term Goals: 24-36 weeks   Pt will be compliant with % of prescribed amount.   The pt to demo pain free and uncompensated running mechanics x10 min on an indoor treadmill  The pt to demo tolerance to a squat at or bellow parallel  with uncompensated mechanics x10   The pt to demo strength of R LE within 10% of L LE as demo'd on the biodex machine   The pt to demo a deficit of 10% or less on a triple hop, single leg broad jump and crossover hop compared to non operative LE.   The pt will report full participation in ADLs and IADLs without restrictions related to R knee.       Plan   Plan of care Certification: 6/16/2023 to 12/31/2023.    Outpatient Physical Therapy 2-3 times weekly for 36 weeks to include the following interventions: Electrical Stimulation  , Gait Training, Manual Therapy, Moist Heat/ Ice, Neuromuscular Re-ed, Patient Education, Therapeutic Activities, and Therapeutic Exercise.     Charbel Lawrence, PT , DPT  Tristin Santana, PT, DPT, SCS

## 2023-06-18 NOTE — ANESTHESIA POST-OP PAIN MANAGEMENT
Attempted to call Royal Martinez GABRIEL at 4:58 PM on 06/18/2023 regarding the PNC and Nimbus pump and to remind him that the PNC should be removed today.  A voicemail was left encouraging the patient to call with any problems or questions.      Felipe Crane MD   Fellow  Regional Anesthesiology and Acute Pain Medicine  [facility]

## 2023-06-19 ENCOUNTER — CLINICAL SUPPORT (OUTPATIENT)
Dept: REHABILITATION | Facility: HOSPITAL | Age: 23
End: 2023-06-19
Payer: COMMERCIAL

## 2023-06-19 DIAGNOSIS — M25.661 DECREASED RANGE OF MOTION (ROM) OF RIGHT KNEE: Primary | ICD-10-CM

## 2023-06-19 DIAGNOSIS — M62.81 QUADRICEPS WEAKNESS: ICD-10-CM

## 2023-06-19 PROCEDURE — 97110 THERAPEUTIC EXERCISES: CPT | Performed by: PHYSICAL THERAPIST

## 2023-06-19 PROCEDURE — 97140 MANUAL THERAPY 1/> REGIONS: CPT | Performed by: PHYSICAL THERAPIST

## 2023-06-19 PROCEDURE — 97112 NEUROMUSCULAR REEDUCATION: CPT | Performed by: PHYSICAL THERAPIST

## 2023-06-22 ENCOUNTER — CLINICAL SUPPORT (OUTPATIENT)
Dept: REHABILITATION | Facility: HOSPITAL | Age: 23
End: 2023-06-22
Payer: COMMERCIAL

## 2023-06-22 DIAGNOSIS — M25.661 DECREASED RANGE OF MOTION (ROM) OF RIGHT KNEE: Primary | ICD-10-CM

## 2023-06-22 DIAGNOSIS — M62.81 QUADRICEPS WEAKNESS: ICD-10-CM

## 2023-06-22 PROCEDURE — 97112 NEUROMUSCULAR REEDUCATION: CPT

## 2023-06-22 PROCEDURE — 97110 THERAPEUTIC EXERCISES: CPT

## 2023-06-22 PROCEDURE — 97140 MANUAL THERAPY 1/> REGIONS: CPT

## 2023-06-22 NOTE — PROGRESS NOTES
[facility]   Physical Therapy Treatment Note      Name: Royal Martinez II  Clinic Number: [id number]    Therapy Diagnosis:   Encounter Diagnoses   Name Primary?    Decreased range of motion (ROM) of right knee Yes    Quadriceps weakness      Physician: Rossy Patton*    Visit Date: 6/19/2023  Physician Orders: PT Eval and Treat  Medical Diagnosis from Referral: Complex tear of lateral meniscus of right knee, unspecified whether old or current tear, initial encounter [S83.271A]  Evaluation Date: 6/16/2023  Authorization Period Expiration: 12/31/2023  Plan of Care Expiration: 12/31/2023  Visit # / Visits authorized:2/20  PTA Visit #: 0/5     Time In: 1700  Time Out: 1800  Total Billable Time: 60 minutes      PROCEDURES PERFORMED:   Right knee arthroscopic combined inside-out and all inside lateral meniscus repair, complex (CPT 47437-84)  Right knee arthroscopic chondroplasty (patella, lateral femoral condyle, lateral tibial plateau) (CPT 05376)  Right knee arthroscopic limited notchplasty  Right knee arthroscopic plica excision      POSTOPERATIVE PLAN: TTWB x 6 weeks with the brace locked in extension given the size of the tear and the repair required. Flexion 0-90 degrees x 6 weeks. ASA per protocol x 2 weeks for DVT prophylaxis. I'll see the patient back in my clinic in 2 weeks. PT to start this week. Will transition to a lateral  brace after week 6, which he will wear through week 12 to offload the repair site.     Subjective     Pt reports: pt states he is doing well. Got a job offer in  so he may be moving once he can walk.  He was compliant with home exercise program.  Response to previous treatment: n/a  Functional change: n/a    Pain: 3/10  Location: right knee     Objective      Objective Measures updated at progress report unless specified.   Knee Passive Range of Motion:    Right  Left    Flexion 80 135   Extension 2 hyper 5      Treatment     Royal received the  treatments listed below:      therapeutic exercises to develop strength, endurance, and ROM for 20 minutes including:  Passive heel slides  Calf stretch  EOT leg assisted flexion extension    manual therapy techniques: Joint mobilizations were applied to the: R knee for 15 minutes, includin D patella mobs  Hinge mobilization  EOT passive knee flexion.    neuromuscular re-education activities to improve: muscle reeducation for 25 minutes. The following activities were included:  Quad set, 3 position towel rolls 10 x 10 seconds 3 rounds with NMES  SLR in brace  Flexion 3  x10  abd, ext  2 x 20    therapeutic activities to improve functional performance for   minutes, including:  hot pack for  minutes to .    cold pack for  minutes to .    Patient Education and Home Exercises       Education provided:   - reviewed hep and TTWB precaution    Written Home Exercises Provided: yes. Exercises were reviewed and Royal was able to demonstrate them prior to the end of the session.  Royal demonstrated good  understanding of the education provided. See EMR under Patient Instructions for exercises provided during therapy sessions    Assessment     Pt ROM progressing well. Swelling improving, pain reduced. Difficulty with quad engagement at first but improves throughout session. Increased quad sets to on the hour.     Royal Is progressing well towards his goals.   Pt prognosis is Excellent.     Pt will continue to benefit from skilled outpatient physical therapy to address the deficits listed in the problem list box on initial evaluation, provide pt/family education and to maximize pt's level of independence in the home and community environment.     Pt's spiritual, cultural and educational needs considered and pt agreeable to plan of care and goals.     Anticipated barriers to physical therapy: none  Goals:  Short Term Goals: 8-10 weeks  1. Pt will be compliant with HEP 50% of prescribed amount.   2. The pt to demo  improvement in R knee ROM to equal L knee ROM pain free   3.  The pt to demo good quad set with proper hyperextension moment of the R knee   4. The pt to demo ambualting with elast restricitve AD witout major compensatory pattern R knee for at least 20 feet      Long Term Goals: 24-36 weeks   Pt will be compliant with % of prescribed amount.   The pt to demo pain free and uncompensated running mechanics x10 min on an indoor treadmill  The pt to demo tolerance to a squat at or bellow parallel with uncompensated mechanics x10   The pt to demo strength of R LE within 10% of L LE as demo'd on the biodex machine   The pt to demo a deficit of 10% or less on a triple hop, single leg broad jump and crossover hop compared to non operative LE.   The pt will report full participation in ADLs and IADLs without restrictions related to R knee.         Plan   Plan of care Certification: 6/16/2023 to 12/31/2023.     Outpatient Physical Therapy 2-3 times weekly for 36 weeks to include the following interventions: Electrical Stimulation  , Gait Training, Manual Therapy, Moist Heat/ Ice, Neuromuscular Re-ed, Patient Education, Therapeutic Activities, and Therapeutic Exercise.     Tristin Santana, PT

## 2023-06-22 NOTE — PROGRESS NOTES
[facility]   Physical Therapy Treatment Note      Name: Royal Martinez II  Clinic Number: [id number]    Therapy Diagnosis:   Encounter Diagnoses   Name Primary?    Decreased range of motion (ROM) of right knee Yes    Quadriceps weakness      Physician: Rossy Patton*    Visit Date: 6/22/2023  Physician Orders: PT Eval and Treat  Medical Diagnosis from Referral: Complex tear of lateral meniscus of right knee, unspecified whether old or current tear, initial encounter [S83.271A]  Evaluation Date: 6/16/2023  Authorization Period Expiration: 12/31/2023  Plan of Care Expiration: 12/31/2023  Visit # / Visits authorized:3/20  PTA Visit #: 0/5     Time In: 1500  Time Out: 1615  Total Billable Time: 60 minutes      PROCEDURES PERFORMED:   Right knee arthroscopic combined inside-out and all inside lateral meniscus repair, complex (CPT 89038-20)  Right knee arthroscopic chondroplasty (patella, lateral femoral condyle, lateral tibial plateau) (CPT 83306)  Right knee arthroscopic limited notchplasty  Right knee arthroscopic plica excision      POSTOPERATIVE PLAN: TTWB x 6 weeks with the brace locked in extension given the size of the tear and the repair required. Flexion 0-90 degrees x 6 weeks. ASA per protocol x 2 weeks for DVT prophylaxis. I'll see the patient back in my clinic in 2 weeks. PT to start this week. Will transition to a lateral  brace after week 6, which he will wear through week 12 to offload the repair site.     Subjective     Pt reports: the knee is feeling good, just a little anterior knee pain with heel props    He was compliant with home exercise program.  Response to previous treatment: n/a  Functional change: n/a    Pain: 3/10  Location: right knee     Objective      Objective Measures updated at progress report unless specified.   Knee Passive Range of Motion:    Right  Left    Flexion 80 135   Extension 5 hyper 5      Treatment     Royal received the treatments  listed below:      therapeutic exercises to develop strength, endurance, and ROM for 19 minutes including:  Heel prop 10'  Heel slides w/ 2-3s holds and quad sets at the bottom x 20      NP  Passive heel slides  Calf stretch  EOT leg assisted flexion extension    manual therapy techniques: Joint mobilizations were applied to the: R knee for 11 minutes, includin D patella mobs  Fat pad mobs  Hinge mobilization  Pt education on self patellar and fat pad mobs  Fibular head mobs    EOT passive knee flexion.    neuromuscular re-education activities to improve: muscle reeducation for 45 minutes. The following activities were included:    Quad sets x 10 w/ 10s holds  Quad sets w/ strap assist x 10s holds (multpile bouts)    NMES for quad 10/10 on/off w/ 5s ramp for 15' plus set up   -Quad set w/ strap assist 10'   -SAQ w/ strap assist 10'    SLR in brace x 20 each   -flexion   -ABD   -ext     therapeutic activities to improve functional performance for   minutes, including:  hot pack for  minutes to .    cold pack for  minutes to .    Patient Education and Home Exercises       Education provided:   - reviewed hep and TTWB precaution    Written Home Exercises Provided: yes. Exercises were reviewed and Royal was able to demonstrate them prior to the end of the session.  Royal demonstrated good  understanding of the education provided. See EMR under Patient Instructions for exercises provided during therapy sessions    Assessment     Pt ROM continues to progress well. Pt demo'd appropriate quad activation requiring strap assist for only last 5* by EOS. Pt demo'd ability to self-manage patellar and fat pad mobs to improve anterior knee pain with practice.    Royal Is progressing well towards his goals.   Pt prognosis is Excellent.     Pt will continue to benefit from skilled outpatient physical therapy to address the deficits listed in the problem list box on initial evaluation, provide pt/family education and to maximize  pt's level of independence in the home and community environment.     Pt's spiritual, cultural and educational needs considered and pt agreeable to plan of care and goals.     Anticipated barriers to physical therapy: none  Goals:  Short Term Goals: 8-10 weeks  1. Pt will be compliant with HEP 50% of prescribed amount.   2. The pt to demo improvement in R knee ROM to equal L knee ROM pain free   3.  The pt to demo good quad set with proper hyperextension moment of the R knee   4. The pt to demo ambualting with elast restricitve AD witout major compensatory pattern R knee for at least 20 feet      Long Term Goals: 24-36 weeks   Pt will be compliant with % of prescribed amount.   The pt to demo pain free and uncompensated running mechanics x10 min on an indoor treadmill  The pt to demo tolerance to a squat at or bellow parallel with uncompensated mechanics x10   The pt to demo strength of R LE within 10% of L LE as demo'd on the biodex machine   The pt to demo a deficit of 10% or less on a triple hop, single leg broad jump and crossover hop compared to non operative LE.   The pt will report full participation in ADLs and IADLs without restrictions related to R knee.         Plan   Plan of care Certification: 6/16/2023 to 12/31/2023.     Outpatient Physical Therapy 2-3 times weekly for 36 weeks to include the following interventions: Electrical Stimulation  , Gait Training, Manual Therapy, Moist Heat/ Ice, Neuromuscular Re-ed, Patient Education, Therapeutic Activities, and Therapeutic Exercise.     Charbel Lawrence, PT

## 2023-06-26 ENCOUNTER — OFFICE VISIT (OUTPATIENT)
Dept: SPORTS MEDICINE | Facility: CLINIC | Age: 23
End: 2023-06-26
Payer: COMMERCIAL

## 2023-06-26 ENCOUNTER — CLINICAL SUPPORT (OUTPATIENT)
Dept: REHABILITATION | Facility: HOSPITAL | Age: 23
End: 2023-06-26
Payer: COMMERCIAL

## 2023-06-26 VITALS
DIASTOLIC BLOOD PRESSURE: 76 MMHG | SYSTOLIC BLOOD PRESSURE: 127 MMHG | BODY MASS INDEX: 30.54 KG/M2 | HEIGHT: 74 IN | WEIGHT: 238 LBS | HEART RATE: 82 BPM

## 2023-06-26 DIAGNOSIS — M62.81 QUADRICEPS WEAKNESS: ICD-10-CM

## 2023-06-26 DIAGNOSIS — Z98.890 S/P LATERAL MENISCUS REPAIR OF RIGHT KNEE: Primary | ICD-10-CM

## 2023-06-26 DIAGNOSIS — M25.661 DECREASED RANGE OF MOTION (ROM) OF RIGHT KNEE: Primary | ICD-10-CM

## 2023-06-26 PROCEDURE — 99999 PR PBB SHADOW E&M-EST. PATIENT-LVL III: ICD-10-PCS | Mod: PBBFAC,,, | Performed by: PHYSICIAN ASSISTANT

## 2023-06-26 PROCEDURE — 99024 POSTOP FOLLOW-UP VISIT: CPT | Mod: S$GLB,,, | Performed by: PHYSICIAN ASSISTANT

## 2023-06-26 PROCEDURE — 3074F SYST BP LT 130 MM HG: CPT | Mod: CPTII,S$GLB,, | Performed by: PHYSICIAN ASSISTANT

## 2023-06-26 PROCEDURE — 99999 PR PBB SHADOW E&M-EST. PATIENT-LVL III: CPT | Mod: PBBFAC,,, | Performed by: PHYSICIAN ASSISTANT

## 2023-06-26 PROCEDURE — 1160F RVW MEDS BY RX/DR IN RCRD: CPT | Mod: CPTII,S$GLB,, | Performed by: PHYSICIAN ASSISTANT

## 2023-06-26 PROCEDURE — 99024 PR POST-OP FOLLOW-UP VISIT: ICD-10-PCS | Mod: S$GLB,,, | Performed by: PHYSICIAN ASSISTANT

## 2023-06-26 PROCEDURE — 1159F MED LIST DOCD IN RCRD: CPT | Mod: CPTII,S$GLB,, | Performed by: PHYSICIAN ASSISTANT

## 2023-06-26 PROCEDURE — 3008F BODY MASS INDEX DOCD: CPT | Mod: CPTII,S$GLB,, | Performed by: PHYSICIAN ASSISTANT

## 2023-06-26 PROCEDURE — 3078F DIAST BP <80 MM HG: CPT | Mod: CPTII,S$GLB,, | Performed by: PHYSICIAN ASSISTANT

## 2023-06-26 PROCEDURE — 3078F PR MOST RECENT DIASTOLIC BLOOD PRESSURE < 80 MM HG: ICD-10-PCS | Mod: CPTII,S$GLB,, | Performed by: PHYSICIAN ASSISTANT

## 2023-06-26 PROCEDURE — 3074F PR MOST RECENT SYSTOLIC BLOOD PRESSURE < 130 MM HG: ICD-10-PCS | Mod: CPTII,S$GLB,, | Performed by: PHYSICIAN ASSISTANT

## 2023-06-26 PROCEDURE — 1160F PR REVIEW ALL MEDS BY PRESCRIBER/CLIN PHARMACIST DOCUMENTED: ICD-10-PCS | Mod: CPTII,S$GLB,, | Performed by: PHYSICIAN ASSISTANT

## 2023-06-26 PROCEDURE — 1159F PR MEDICATION LIST DOCUMENTED IN MEDICAL RECORD: ICD-10-PCS | Mod: CPTII,S$GLB,, | Performed by: PHYSICIAN ASSISTANT

## 2023-06-26 PROCEDURE — 3008F PR BODY MASS INDEX (BMI) DOCUMENTED: ICD-10-PCS | Mod: CPTII,S$GLB,, | Performed by: PHYSICIAN ASSISTANT

## 2023-06-26 PROCEDURE — 97112 NEUROMUSCULAR REEDUCATION: CPT

## 2023-06-26 PROCEDURE — 97140 MANUAL THERAPY 1/> REGIONS: CPT

## 2023-06-26 PROCEDURE — 97110 THERAPEUTIC EXERCISES: CPT

## 2023-06-26 NOTE — PROGRESS NOTES
S:Royal Martinez II presents for post-operative evaluation.     DATE OF PROCEDURE: 6/13/2023      PROCEDURES PERFORMED:   Right knee arthroscopic combined inside-out and all inside lateral meniscus repair, complex (CPT 06280-55)  Right knee arthroscopic chondroplasty (patella, lateral femoral condyle, lateral tibial plateau) (CPT 71442)  Right knee arthroscopic limited notchplasty  Right knee arthroscopic plica excision    Royal Martinez II reports to be doing well 2wk s/p the above mentioned procedure. Denies fevers, chills, night sweats, chest pain, difficulty breathing, calf pain or tenderness. Going to PT 2xWeek at the [place] location with Katherine. Seeing good progress daily. Has been compliant with weightbearing restrictions. Ambulating using crutches. Pain levels are improving. Has d/c'd pain medication. Completed ASA.    O: The incisions are healing well.  No signs of infection.  Sutures were removed. No significant pain or unusual tenderness. aROM 0-85. Quad activating.    A/P: Patient doing well. Continue TTWB. Will fit for a lateral  brace today. Incisions healing well. Ok to shower with incisions uncovered. No submerging at this point. Plan to follow the rehab plan as previously outlined. RTC in 4 weeks.     POSTOPERATIVE PLAN: TTWB x 6 weeks with the brace locked in extension given the size of the tear and the repair required. Flexion 0-90 degrees x 6 weeks. ASA per protocol x 2 weeks for DVT prophylaxis. I'll see the patient back in my clinic in 2 weeks. PT to start this week. Will transition to a lateral  brace after week 6, which he will wear through week 12 to offload the repair site.

## 2023-06-26 NOTE — PROGRESS NOTES
[facility]   Physical Therapy Treatment Note      Name: Royal Martinez II  Clinic Number: [id number]    Therapy Diagnosis:   Encounter Diagnoses   Name Primary?    Decreased range of motion (ROM) of right knee Yes    Quadriceps weakness      Physician: Rossy Patton*    Visit Date: 6/26/2023  Physician Orders: PT Eval and Treat  Medical Diagnosis from Referral: Complex tear of lateral meniscus of right knee, unspecified whether old or current tear, initial encounter [S83.271A]  Evaluation Date: 6/16/2023  Authorization Period Expiration: 12/31/2023  Plan of Care Expiration: 12/31/2023  Visit # / Visits authorized:3/20  PTA Visit #: 0/5     Time In: 1100  Time Out: 1200  Total Billable Time: 60 minutes      PROCEDURES PERFORMED:   Right knee arthroscopic combined inside-out and all inside lateral meniscus repair, complex (CPT 15731-86)  Right knee arthroscopic chondroplasty (patella, lateral femoral condyle, lateral tibial plateau) (CPT 81796)  Right knee arthroscopic limited notchplasty  Right knee arthroscopic plica excision      POSTOPERATIVE PLAN: TTWB x 6 weeks with the brace locked in extension given the size of the tear and the repair required. Flexion 0-90 degrees x 6 weeks. ASA per protocol x 2 weeks for DVT prophylaxis. I'll see the patient back in my clinic in 2 weeks. PT to start this week. Will transition to a lateral  brace after week 6, which he will wear through week 12 to offload the repair site.     Subjective     Pt reports: the knee is feeling good overall    He was compliant with home exercise program.  Response to previous treatment: n/a  Functional change: n/a    Pain: 3/10  Location: right knee     Objective      Objective Measures updated at progress report unless specified.   Knee Passive Range of Motion:    Right  Left    Flexion 80 135   Extension 5 hyper 5      Treatment     Royal received the treatments listed below:      therapeutic exercises  to develop strength, endurance, and ROM for 16 minutes including:  Heel prop 10'  Heel slides w/ 2-3s holds and quad sets at the bottom x 20      NP  Passive heel slides  Calf stretch  EOT leg assisted flexion extension    manual therapy techniques: Joint mobilizations were applied to the: R knee for 08 minutes, includin D patella mobs  Fat pad mobs  Hinge mobilization      neuromuscular re-education activities to improve: muscle reeducation for 36 minutes. The following activities were included:    Quad sets x 10 w/ 10s holds  Quad sets w/ strap assist x 10s holds (multpile bouts)    NMES for quad 10/10 on/off w/ 5s ramp for 15' plus set up   -Quad set w/ strap assist 5'   -SAQ w/ strap assist 5'   -SLR 5'    SLR 3 x 5    SLR in brace x 20 each   -ABD   -ext     therapeutic activities to improve functional performance for   minutes, including:  hot pack for  minutes to .    cold pack for  minutes to .    Patient Education and Home Exercises       Education provided:   - reviewed hep and TTWB precaution    Written Home Exercises Provided: yes. Exercises were reviewed and Royal was able to demonstrate them prior to the end of the session.  Royal demonstrated good  understanding of the education provided. See EMR under Patient Instructions for exercises provided during therapy sessions    Assessment     Pt presented today with slight stickiness into ext ROM that improved with LLLD, manual, and t/o session. Pt continues to benefit from strap assist for NM priming of quad. Pt demo'd ability to perform SLR with no lag by EOS    Royal Is progressing well towards his goals.   Pt prognosis is Excellent.     Pt will continue to benefit from skilled outpatient physical therapy to address the deficits listed in the problem list box on initial evaluation, provide pt/family education and to maximize pt's level of independence in the home and community environment.     Pt's spiritual, cultural and educational needs  considered and pt agreeable to plan of care and goals.     Anticipated barriers to physical therapy: none  Goals:  Short Term Goals: 8-10 weeks  1. Pt will be compliant with HEP 50% of prescribed amount.   2. The pt to demo improvement in R knee ROM to equal L knee ROM pain free   3.  The pt to demo good quad set with proper hyperextension moment of the R knee   4. The pt to demo ambualting with elast restricitve AD witout major compensatory pattern R knee for at least 20 feet      Long Term Goals: 24-36 weeks   Pt will be compliant with % of prescribed amount.   The pt to demo pain free and uncompensated running mechanics x10 min on an indoor treadmill  The pt to demo tolerance to a squat at or bellow parallel with uncompensated mechanics x10   The pt to demo strength of R LE within 10% of L LE as demo'd on the biodex machine   The pt to demo a deficit of 10% or less on a triple hop, single leg broad jump and crossover hop compared to non operative LE.   The pt will report full participation in ADLs and IADLs without restrictions related to R knee.         Plan   Plan of care Certification: 6/16/2023 to 12/31/2023.     Outpatient Physical Therapy 2-3 times weekly for 36 weeks to include the following interventions: Electrical Stimulation  , Gait Training, Manual Therapy, Moist Heat/ Ice, Neuromuscular Re-ed, Patient Education, Therapeutic Activities, and Therapeutic Exercise.     Charbel Lawrence, PT, DPT

## 2023-06-28 ENCOUNTER — CLINICAL SUPPORT (OUTPATIENT)
Dept: REHABILITATION | Facility: HOSPITAL | Age: 23
End: 2023-06-28
Payer: COMMERCIAL

## 2023-06-28 DIAGNOSIS — M62.81 QUADRICEPS WEAKNESS: ICD-10-CM

## 2023-06-28 DIAGNOSIS — M25.661 DECREASED RANGE OF MOTION (ROM) OF RIGHT KNEE: Primary | ICD-10-CM

## 2023-06-28 PROCEDURE — 97112 NEUROMUSCULAR REEDUCATION: CPT | Performed by: PHYSICAL THERAPIST

## 2023-06-28 PROCEDURE — 97110 THERAPEUTIC EXERCISES: CPT | Performed by: PHYSICAL THERAPIST

## 2023-06-28 PROCEDURE — 97140 MANUAL THERAPY 1/> REGIONS: CPT | Performed by: PHYSICAL THERAPIST

## 2023-06-28 NOTE — PROGRESS NOTES
[facility]   Physical Therapy Treatment Note      Name: Royal Martinez II  Clinic Number: [id number]    Therapy Diagnosis:   Encounter Diagnoses   Name Primary?    Decreased range of motion (ROM) of right knee Yes    Quadriceps weakness      Physician: Rossy Patton*    Visit Date: 6/28/2023  Physician Orders: PT Eval and Treat  Medical Diagnosis from Referral: Complex tear of lateral meniscus of right knee, unspecified whether old or current tear, initial encounter [S83.271A]  Evaluation Date: 6/16/2023  Authorization Period Expiration: 12/31/2023  Plan of Care Expiration: 12/31/2023  Visit # / Visits authorized:5/20  PTA Visit #: 0/5     Time In: 1500  Time Out: 1615  Total Billable Time: 60 minutes      PROCEDURES PERFORMED:   Right knee arthroscopic combined inside-out and all inside lateral meniscus repair, complex (CPT 77253-19)  Right knee arthroscopic chondroplasty (patella, lateral femoral condyle, lateral tibial plateau) (CPT 28530)  Right knee arthroscopic limited notchplasty  Right knee arthroscopic plica excision      POSTOPERATIVE PLAN: TTWB x 6 weeks with the brace locked in extension given the size of the tear and the repair required. Flexion 0-90 degrees x 6 weeks. ASA per protocol x 2 weeks for DVT prophylaxis. I'll see the patient back in my clinic in 2 weeks. PT to start this week. Will transition to a lateral  brace after week 6, which he will wear through week 12 to offload the repair site.     Subjective     Pt reports: doing well. Feels knee is progressing. Still has some swelling he feels is limiting quad control    He was compliant with home exercise program.  Response to previous treatment: n/a  Functional change: n/a    Pain: 3/10  Location: right knee     Objective      Objective Measures updated at progress report unless specified.   Knee Passive Range of Motion:    Right  Left    Flexion 90    Extension 5 hyper 5      Treatment      Royal received the treatments listed below:      therapeutic exercises to develop strength, endurance, and ROM for 16 minutes including:  Heel prop 6, 5 lbs 2 x   Heel slides w/ 2-3s holds and quad sets at the bottom x 20  EOT leg assisted flexion extension    manual therapy techniques: Joint mobilizations were applied to the: R knee for 08 minutes, includin D patella mobs  Fat pad mobs  Hinge mobilization  Scar mobilization lateral incision      neuromuscular re-education activities to improve: muscle reeducation for 36 minutes. The following activities were included:    Quad sets x 10 w/ 10s holds  Quad sets w/ strap assist x 10s holds (multpile bouts)     -Quad set w/ strap assist 5'   -SAQ w/ strap assist 5'   -SLR 5'    SLR 3 x 5    SLR in brace x 20 each   -ABD   -ext     therapeutic activities to improve functional performance for   minutes, including:  hot pack for  minutes to .    cold pack for  minutes to .    Patient Education and Home Exercises       Education provided:   - reviewed hep and TTWB precaution    Written Home Exercises Provided: yes. Exercises were reviewed and Royal was able to demonstrate them prior to the end of the session.  Royal demonstrated good  understanding of the education provided. See EMR under Patient Instructions for exercises provided during therapy sessions    Assessment     Patella and fat pad stiff start of tx. Improved with mobilization and ROM, quad reps. Improved knee flexion from 80-90 at EOT after scar mobilization.     Royal Is progressing well towards his goals.   Pt prognosis is Excellent.     Pt will continue to benefit from skilled outpatient physical therapy to address the deficits listed in the problem list box on initial evaluation, provide pt/family education and to maximize pt's level of independence in the home and community environment.     Pt's spiritual, cultural and educational needs considered and pt agreeable to plan of care and goals.      Anticipated barriers to physical therapy: none  Goals:  Short Term Goals: 8-10 weeks  1. Pt will be compliant with HEP 50% of prescribed amount.   2. The pt to demo improvement in R knee ROM to equal L knee ROM pain free   3.  The pt to demo good quad set with proper hyperextension moment of the R knee   4. The pt to demo ambualting with elast restricitve AD witout major compensatory pattern R knee for at least 20 feet      Long Term Goals: 24-36 weeks   Pt will be compliant with % of prescribed amount.   The pt to demo pain free and uncompensated running mechanics x10 min on an indoor treadmill  The pt to demo tolerance to a squat at or bellow parallel with uncompensated mechanics x10   The pt to demo strength of R LE within 10% of L LE as demo'd on the biodex machine   The pt to demo a deficit of 10% or less on a triple hop, single leg broad jump and crossover hop compared to non operative LE.   The pt will report full participation in ADLs and IADLs without restrictions related to R knee.         Plan   Plan of care Certification: 6/16/2023 to 12/31/2023.     Outpatient Physical Therapy 2-3 times weekly for 36 weeks to include the following interventions: Electrical Stimulation  , Gait Training, Manual Therapy, Moist Heat/ Ice, Neuromuscular Re-ed, Patient Education, Therapeutic Activities, and Therapeutic Exercise.     Tristin Santana, PT, DPT

## 2023-07-03 ENCOUNTER — CLINICAL SUPPORT (OUTPATIENT)
Dept: REHABILITATION | Facility: HOSPITAL | Age: 23
End: 2023-07-03
Payer: COMMERCIAL

## 2023-07-03 DIAGNOSIS — M62.81 QUADRICEPS WEAKNESS: ICD-10-CM

## 2023-07-03 DIAGNOSIS — M25.661 DECREASED RANGE OF MOTION (ROM) OF RIGHT KNEE: Primary | ICD-10-CM

## 2023-07-03 PROCEDURE — 97110 THERAPEUTIC EXERCISES: CPT

## 2023-07-03 PROCEDURE — 97112 NEUROMUSCULAR REEDUCATION: CPT

## 2023-07-03 PROCEDURE — 97140 MANUAL THERAPY 1/> REGIONS: CPT

## 2023-07-03 NOTE — PROGRESS NOTES
[facility]   Physical Therapy Treatment Note      Name: Royal Martinez II  Clinic Number: [id number]    Therapy Diagnosis:   Encounter Diagnoses   Name Primary?    Decreased range of motion (ROM) of right knee Yes    Quadriceps weakness      Physician: Rossy Patton*    Visit Date: 7/3/2023  Physician Orders: PT Eval and Treat  Medical Diagnosis from Referral: Complex tear of lateral meniscus of right knee, unspecified whether old or current tear, initial encounter [S83.271A]  Evaluation Date: 6/16/2023  Authorization Period Expiration: 12/31/2023  Plan of Care Expiration: 12/31/2023  Visit # / Visits authorized:6/20  PTA Visit #: 0/5     Time In: 1500  Time Out: 1605  Total Billable Time: 65 minutes      PROCEDURES PERFORMED:   Right knee arthroscopic combined inside-out and all inside lateral meniscus repair, complex (CPT 36154-16)  Right knee arthroscopic chondroplasty (patella, lateral femoral condyle, lateral tibial plateau) (CPT 48966)  Right knee arthroscopic limited notchplasty  Right knee arthroscopic plica excision      POSTOPERATIVE PLAN: TTWB x 6 weeks with the brace locked in extension given the size of the tear and the repair required. Flexion 0-90 degrees x 6 weeks. ASA per protocol x 2 weeks for DVT prophylaxis. I'll see the patient back in my clinic in 2 weeks. PT to start this week. Will transition to a lateral  brace after week 6, which he will wear through week 12 to offload the repair site.     Subjective     Pt reports: doing well. Feels knee is progressing. Still has some swelling he feels is limiting quad control    He was compliant with home exercise program.  Response to previous treatment: n/a  Functional change: n/a    Pain: 3/10  Location: right knee     Objective      Objective Measures updated at progress report unless specified.   Knee Passive Range of Motion:    Right  Left    Flexion 90    Extension 5 hyper 5      Treatment     Royal  received the treatments listed below:      therapeutic exercises to develop strength, endurance, and ROM for 19 minutes including:  Heel prop 10' 10# lbs ; 5# 5' at end  Heel slides w/ 2-3s holds and quad sets at the bottom x 20    NP  EOT leg assisted flexion extension    manual therapy techniques: Joint mobilizations were applied to the: R knee for 11 minutes, includin D patella mobs 0, 30, 60* of flex  Fat pad mobs at 0, 30, 60* of flex  Hinge mobilization  Scar mobilization lateral incision      neuromuscular re-education activities to improve: muscle reeducation for 35 minutes. The following activities were included:    Quad sets x 10 w/ 10s holds  Quad sets w/ strap assist x 10s holds (multpile bouts)    SLR 1# 3 x 10   -Seated 3 x 10   -Semi-parker 3 x 10    Supine SLR 3 x 10   - Flex   -ABD   -ext     NP   -Quad set w/ strap assist 5'   -SAQ w/ strap assist 5'   -SLR 5'    therapeutic activities to improve functional performance for   minutes, including:  hot pack for  minutes to .    cold pack for  minutes to .    Patient Education and Home Exercises       Education provided:   - reviewed hep and TTWB precaution    Written Home Exercises Provided: yes. Exercises were reviewed and Royal was able to demonstrate them prior to the end of the session.  Royal demonstrated good  understanding of the education provided. See EMR under Patient Instructions for exercises provided during therapy sessions    Assessment     Pt presented with slight stiffness into ext and with fatpad and patella that improved with LLLD and manual. Pt demo'd appropriate ROM for current stage of rehab. Pt demo'd improved endurance during SLR activities with ability to increase resistance.    Royal Is progressing well towards his goals.   Pt prognosis is Excellent.     Pt will continue to benefit from skilled outpatient physical therapy to address the deficits listed in the problem list box on initial evaluation, provide pt/family  education and to maximize pt's level of independence in the home and community environment.     Pt's spiritual, cultural and educational needs considered and pt agreeable to plan of care and goals.     Anticipated barriers to physical therapy: none  Goals:  Short Term Goals: 8-10 weeks  1. Pt will be compliant with HEP 50% of prescribed amount.   2. The pt to demo improvement in R knee ROM to equal L knee ROM pain free   3.  The pt to demo good quad set with proper hyperextension moment of the R knee   4. The pt to demo ambualting with elast restricitve AD witout major compensatory pattern R knee for at least 20 feet      Long Term Goals: 24-36 weeks   Pt will be compliant with % of prescribed amount.   The pt to demo pain free and uncompensated running mechanics x10 min on an indoor treadmill  The pt to demo tolerance to a squat at or bellow parallel with uncompensated mechanics x10   The pt to demo strength of R LE within 10% of L LE as demo'd on the biodex machine   The pt to demo a deficit of 10% or less on a triple hop, single leg broad jump and crossover hop compared to non operative LE.   The pt will report full participation in ADLs and IADLs without restrictions related to R knee.         Plan   Plan of care Certification: 6/16/2023 to 12/31/2023.     Outpatient Physical Therapy 2-3 times weekly for 36 weeks to include the following interventions: Electrical Stimulation  , Gait Training, Manual Therapy, Moist Heat/ Ice, Neuromuscular Re-ed, Patient Education, Therapeutic Activities, and Therapeutic Exercise.     Charbel Lawrence, PT, DPT

## 2023-07-06 ENCOUNTER — CLINICAL SUPPORT (OUTPATIENT)
Dept: REHABILITATION | Facility: HOSPITAL | Age: 23
End: 2023-07-06
Payer: COMMERCIAL

## 2023-07-06 DIAGNOSIS — M62.81 QUADRICEPS WEAKNESS: ICD-10-CM

## 2023-07-06 DIAGNOSIS — M25.661 DECREASED RANGE OF MOTION (ROM) OF RIGHT KNEE: Primary | ICD-10-CM

## 2023-07-06 PROCEDURE — 97112 NEUROMUSCULAR REEDUCATION: CPT

## 2023-07-06 PROCEDURE — 97110 THERAPEUTIC EXERCISES: CPT

## 2023-07-06 NOTE — PROGRESS NOTES
[facility]   Physical Therapy Treatment Note      Name: Royal Martinez II  Clinic Number: [id number]    Therapy Diagnosis:   Encounter Diagnoses   Name Primary?    Decreased range of motion (ROM) of right knee Yes    Quadriceps weakness      Physician: Rossy Patton*    Visit Date: 7/6/2023  Physician Orders: PT Eval and Treat  Medical Diagnosis from Referral: Complex tear of lateral meniscus of right knee, unspecified whether old or current tear, initial encounter [S83.271A]  Evaluation Date: 6/16/2023  Authorization Period Expiration: 12/31/2023  Plan of Care Expiration: 12/31/2023  Visit # / Visits authorized:7/20  PTA Visit #: 0/5     Time In: 1500  Time Out: 1605  Total Billable Time: 65 minutes      PROCEDURES PERFORMED:   Right knee arthroscopic combined inside-out and all inside lateral meniscus repair, complex (CPT 45686-47)  Right knee arthroscopic chondroplasty (patella, lateral femoral condyle, lateral tibial plateau) (CPT 96872)  Right knee arthroscopic limited notchplasty  Right knee arthroscopic plica excision      POSTOPERATIVE PLAN: TTWB x 6 weeks with the brace locked in extension given the size of the tear and the repair required. Flexion 0-90 degrees x 6 weeks. ASA per protocol x 2 weeks for DVT prophylaxis. I'll see the patient back in my clinic in 2 weeks. PT to start this week. Will transition to a lateral  brace after week 6, which he will wear through week 12 to offload the repair site.     Subjective     Pt reports: That the knee is feeling good, has been doing his HEP    He was compliant with home exercise program.  Response to previous treatment: n/a  Functional change: n/a    Pain: 3/10  Location: right knee     Objective      Objective Measures updated at progress report unless specified.   Knee Passive Range of Motion:    Right  Left    Flexion 90    Extension 5 hyper 5      Treatment     Royal received the treatments listed below:       therapeutic exercises to develop strength, endurance, and ROM for 14 minutes including:  Heel prop 10' 10# lbs  Heel slides w/ 2-3s holds and quad sets at the bottom x 20    NP  EOT leg assisted flexion extension    manual therapy techniques: Joint mobilizations were applied to the: R knee for 02 minutes, includin D patella mobs 0, 30, 60* of flex  Fat pad mobs at 0, 30, 60* of flex  Hinge mobilization  Scar mobilization lateral incision      neuromuscular re-education activities to improve: muscle reeducation for 44 minutes. The following activities were included:    Quad sets x 10 w/ 10s holds  Quad sets w/ strap assist x 10s holds (multpile bouts)    LAQ x 10 w/ 5-10s holds and controlled eccentric at EOS    Crunch up to sit up 2  x 15    SLR 2# 2 x 15   - Upright 2 x 15   -Semi-parker 2 x 15    Supine SLR 2#  2 x 15   - Flex   -ABD   -ext     therapeutic activities to improve functional performance for   minutes, including:  hot pack for  minutes to .    cold pack for  minutes to .    Patient Education and Home Exercises       Education provided:   - reviewed hep and TTWB precaution    Written Home Exercises Provided: yes. Exercises were reviewed and Royal was able to demonstrate them prior to the end of the session.  Royal demonstrated good  understanding of the education provided. See EMR under Patient Instructions for exercises provided during therapy sessions    Assessment     Pt continues to present with slight stiffness into ext and with fatpad and patella that improved with LLLD and manual. Pt making continued progress in quad endurance and OKC strength.    Royal Is progressing well towards his goals.   Pt prognosis is Excellent.     Pt will continue to benefit from skilled outpatient physical therapy to address the deficits listed in the problem list box on initial evaluation, provide pt/family education and to maximize pt's level of independence in the home and community environment.     Pt's  spiritual, cultural and educational needs considered and pt agreeable to plan of care and goals.     Anticipated barriers to physical therapy: none  Goals:  Short Term Goals: 8-10 weeks  1. Pt will be compliant with HEP 50% of prescribed amount.   2. The pt to demo improvement in R knee ROM to equal L knee ROM pain free   3.  The pt to demo good quad set with proper hyperextension moment of the R knee   4. The pt to demo ambualting with elast restricitve AD witout major compensatory pattern R knee for at least 20 feet      Long Term Goals: 24-36 weeks   Pt will be compliant with % of prescribed amount.   The pt to demo pain free and uncompensated running mechanics x10 min on an indoor treadmill  The pt to demo tolerance to a squat at or bellow parallel with uncompensated mechanics x10   The pt to demo strength of R LE within 10% of L LE as demo'd on the biodex machine   The pt to demo a deficit of 10% or less on a triple hop, single leg broad jump and crossover hop compared to non operative LE.   The pt will report full participation in ADLs and IADLs without restrictions related to R knee.         Plan   Plan of care Certification: 6/16/2023 to 12/31/2023.     Outpatient Physical Therapy 2-3 times weekly for 36 weeks to include the following interventions: Electrical Stimulation  , Gait Training, Manual Therapy, Moist Heat/ Ice, Neuromuscular Re-ed, Patient Education, Therapeutic Activities, and Therapeutic Exercise.     Charbel Lawrence, PT, DPT

## 2023-07-10 ENCOUNTER — CLINICAL SUPPORT (OUTPATIENT)
Dept: REHABILITATION | Facility: HOSPITAL | Age: 23
End: 2023-07-10
Payer: COMMERCIAL

## 2023-07-10 DIAGNOSIS — M62.81 QUADRICEPS WEAKNESS: ICD-10-CM

## 2023-07-10 DIAGNOSIS — M25.661 DECREASED RANGE OF MOTION (ROM) OF RIGHT KNEE: Primary | ICD-10-CM

## 2023-07-10 PROCEDURE — 97112 NEUROMUSCULAR REEDUCATION: CPT

## 2023-07-10 PROCEDURE — 97530 THERAPEUTIC ACTIVITIES: CPT

## 2023-07-10 NOTE — PROGRESS NOTES
[facility]   Physical Therapy Treatment Note      Name: Royal Martinez II  Clinic Number: [id number]    Therapy Diagnosis:   Encounter Diagnoses   Name Primary?    Decreased range of motion (ROM) of right knee Yes    Quadriceps weakness      Physician: Rossy Patton*    Visit Date: 7/10/2023  Physician Orders: PT Eval and Treat  Medical Diagnosis from Referral: Complex tear of lateral meniscus of right knee, unspecified whether old or current tear, initial encounter [S83.271A]  Evaluation Date: 6/16/2023  Authorization Period Expiration: 12/31/2023  Plan of Care Expiration: 12/31/2023  Visit # / Visits authorized:8/20      Time In: 1500  Time Out: 1605  Total Billable Time: 65 minutes    DOS: 6/13/2023 (3wks 6 days)    PROCEDURES PERFORMED:   Right knee arthroscopic combined inside-out and all inside lateral meniscus repair, complex (CPT 15471-96)  Right knee arthroscopic chondroplasty (patella, lateral femoral condyle, lateral tibial plateau) (CPT 32386)  Right knee arthroscopic limited notchplasty  Right knee arthroscopic plica excision      POSTOPERATIVE PLAN: TTWB x 6 weeks with the brace locked in extension given the size of the tear and the repair required. Flexion 0-90 degrees x 6 weeks. ASA per protocol x 2 weeks for DVT prophylaxis. I'll see the patient back in my clinic in 2 weeks. PT to start this week. Will transition to a lateral  brace after week 6, which he will wear through week 12 to offload the repair site.     Subjective     Pt reports: That the knee is feeling good, pt went to the gym and did upper body, found it challenging to rack and unrack weights     He was compliant with home exercise program.  Response to previous treatment: n/a  Functional change: n/a    Pain: 0/10  Location: right knee     Objective      Objective Measures updated at progress report unless specified.   Knee Passive Range of Motion:    Right  Left    Flexion 90 135   Extension 5  hyper 5      Treatment     Royal received the treatments listed below:      therapeutic exercises to develop strength, endurance, and ROM for 26 minutes including:  Heel prop 5' 10# lbs    SLR 2#.5 2 x 15   - Upright 2 x 15   -Semi-parker 2 x 15    Supine SLR 2.5#  2 x 15   - Flex   -ABD   -ext     NP  Heel slides w/ 2-3s holds and quad sets at the bottom x 20  EOT leg assisted flexion extension    manual therapy techniques: Joint mobilizations were applied to the: R knee for 03 minutes, includin D patella mobs 0, 30, 60* of flex  Fat pad mobs at 0, 30, 60* of flex  Hinge mobilization  Scar mobilization lateral incision      neuromuscular re-education activities to improve: muscle reeducation for 31 minutes. The following activities were included:    Quad sets x 10 w/ 10s holds (NM priming)    LAQ 1# 2 x 15 > 2.5# 2 x 15 (pt educated on TKE and eccentric control)  Clamshell GTB 3 x 10 / side (pt educated on stable pelvis)    Crunch up to sit up  3  x 10 (pt educated on not losing momentum)  TA marches  3 x 10 / side (pt educated on keeping pelvis stable)      therapeutic activities to improve functional performance for   minutes, including:  hot pack for  minutes to .    cold pack for  minutes to .    Patient Education and Home Exercises       Education provided:   - reviewed hep and TTWB precaution    Written Home Exercises Provided: yes. Exercises were reviewed and Royal was able to demonstrate them prior to the end of the session.  Royal demonstrated good  understanding of the education provided. See EMR under Patient Instructions for exercises provided during therapy sessions    Assessment     Pt continues to present with slight stiffness into ext and with fatpad and patella that improves with LLLD and manual. Pt continues progressively progressing OKC endurance and str. Pt education reinforced on WB precautions.    Royal Is progressing well towards his goals.   Pt prognosis is Excellent.     Pt will  continue to benefit from skilled outpatient physical therapy to address the deficits listed in the problem list box on initial evaluation, provide pt/family education and to maximize pt's level of independence in the home and community environment.     Pt's spiritual, cultural and educational needs considered and pt agreeable to plan of care and goals.     Anticipated barriers to physical therapy: none  Goals:  Short Term Goals: 8-10 weeks  1. Pt will be compliant with HEP 50% of prescribed amount.   2. The pt to demo improvement in R knee ROM to equal L knee ROM pain free   3.  The pt to demo good quad set with proper hyperextension moment of the R knee   4. The pt to demo ambualting with elast restricitve AD witout major compensatory pattern R knee for at least 20 feet      Long Term Goals: 24-36 weeks   Pt will be compliant with % of prescribed amount.   The pt to demo pain free and uncompensated running mechanics x10 min on an indoor treadmill  The pt to demo tolerance to a squat at or bellow parallel with uncompensated mechanics x10   The pt to demo strength of R LE within 10% of L LE as demo'd on the biodex machine   The pt to demo a deficit of 10% or less on a triple hop, single leg broad jump and crossover hop compared to non operative LE.   The pt will report full participation in ADLs and IADLs without restrictions related to R knee.         Plan   Plan of care Certification: 6/16/2023 to 12/31/2023.     Outpatient Physical Therapy 2-3 times weekly for 36 weeks to include the following interventions: Electrical Stimulation  , Gait Training, Manual Therapy, Moist Heat/ Ice, Neuromuscular Re-ed, Patient Education, Therapeutic Activities, and Therapeutic Exercise.     Charbel Lawrence, PT, DPT

## 2023-07-11 ENCOUNTER — CLINICAL SUPPORT (OUTPATIENT)
Dept: REHABILITATION | Facility: HOSPITAL | Age: 23
End: 2023-07-11
Payer: COMMERCIAL

## 2023-07-11 DIAGNOSIS — M62.81 QUADRICEPS WEAKNESS: ICD-10-CM

## 2023-07-11 DIAGNOSIS — M25.661 DECREASED RANGE OF MOTION (ROM) OF RIGHT KNEE: Primary | ICD-10-CM

## 2023-07-11 PROCEDURE — 97110 THERAPEUTIC EXERCISES: CPT | Performed by: PHYSICAL THERAPIST

## 2023-07-11 PROCEDURE — 97112 NEUROMUSCULAR REEDUCATION: CPT | Performed by: PHYSICAL THERAPIST

## 2023-07-11 NOTE — PROGRESS NOTES
[facility]   Physical Therapy Treatment Note      Name: Royal Martinez II  Clinic Number: [id number]    Therapy Diagnosis:   Encounter Diagnoses   Name Primary?    Decreased range of motion (ROM) of right knee Yes    Quadriceps weakness      Physician: Rossy Patton*    Visit Date: 7/11/2023  Physician Orders: PT Eval and Treat  Medical Diagnosis from Referral: Complex tear of lateral meniscus of right knee, unspecified whether old or current tear, initial encounter [S83.271A]  Evaluation Date: 6/16/2023  Authorization Period Expiration: 12/31/2023  Plan of Care Expiration: 12/31/2023  Visit # / Visits authorized:9/20      Time In: 1500  Time Out: 1605  Total Billable Time: 65 minutes    DOS: 6/13/2023 (4wks days)    PROCEDURES PERFORMED:   Right knee arthroscopic combined inside-out and all inside lateral meniscus repair, complex (CPT 74875-56)  Right knee arthroscopic chondroplasty (patella, lateral femoral condyle, lateral tibial plateau) (CPT 85366)  Right knee arthroscopic limited notchplasty  Right knee arthroscopic plica excision      POSTOPERATIVE PLAN: TTWB x 6 weeks with the brace locked in extension given the size of the tear and the repair required. Flexion 0-90 degrees x 6 weeks. ASA per protocol x 2 weeks for DVT prophylaxis. I'll see the patient back in my clinic in 2 weeks. PT to start this week. Will transition to a lateral  brace after week 6, which he will wear through week 12 to offload the repair site.     Subjective     Pt reports: overall doing well. States he is performing hep regularly.    He was compliant with home exercise program.  Response to previous treatment: n/a  Functional change: n/a    Pain: 0/10  Location: right knee     Objective      Objective Measures updated at progress report unless specified.   Knee Passive Range of Motion:    Right  Left    Flexion 90 135   Extension 5 hyper 5      Treatment     Royal received the treatments  listed below:      therapeutic exercises to develop strength, endurance, and ROM for 30 minutes including:  Heel prop 5' 10# lbs        BFR 80% 30/15/15/15  Supine quad set 3 sec hold  SAQ 3 second hold  SLR 2/0/2/0 tempo    NP  Heel slides w/ 2-3s holds and quad sets at the bottom x 20  EOT leg assisted flexion extension    manual therapy techniques: Joint mobilizations were applied to the: R knee for 03 minutes, includin D patella mobs 0, 30, 60* of flex  Fat pad mobs at 0, 30, 60* of flex  Hinge mobilization  Scar mobilization lateral incision      neuromuscular re-education activities to improve: muscle reeducation for 15 minutes. The following activities were included:    Quad sets x 10 w/ 10s holds (NM priming)  SLR 2 x 20  LAQ 20 reps 2/0/2/0 tempo    np  Clamshell GTB 3 x 10 / side (pt educated on stable pelvis)    Crunch up to sit up  3  x 10 (pt educated on not losing momentum)  TA marches  3 x 10 / side (pt educated on keeping pelvis stable)      therapeutic activities to improve functional performance for   minutes, including:  hot pack for  minutes to .    cold pack for  minutes to .    Patient Education and Home Exercises       Education provided:   - reviewed hep and TTWB precaution    Written Home Exercises Provided: yes. Exercises were reviewed and Royal was able to demonstrate them prior to the end of the session.  Royal demonstrated good  understanding of the education provided. See EMR under Patient Instructions for exercises provided during therapy sessions    Assessment   Mild fat pad stiffness today that cleans up easily with LLLD and fat pad mobilization. Good quad engagement with hyper extension so decided to perform exercises with BFR. Pt fatigued quickly but able to perform all sets except for LAQ attmepts due to c/o n/t in foot. This was switched to SLR which he was able to finish. Ended session with LLLD extension with quad engagement.     Royal Is progressing well towards his  goals.   Pt prognosis is Excellent.     Pt will continue to benefit from skilled outpatient physical therapy to address the deficits listed in the problem list box on initial evaluation, provide pt/family education and to maximize pt's level of independence in the home and community environment.     Pt's spiritual, cultural and educational needs considered and pt agreeable to plan of care and goals.     Anticipated barriers to physical therapy: none  Goals:  Short Term Goals: 8-10 weeks  1. Pt will be compliant with HEP 50% of prescribed amount.   2. The pt to demo improvement in R knee ROM to equal L knee ROM pain free   3.  The pt to demo good quad set with proper hyperextension moment of the R knee   4. The pt to demo ambualting with elast restricitve AD witout major compensatory pattern R knee for at least 20 feet      Long Term Goals: 24-36 weeks   Pt will be compliant with % of prescribed amount.   The pt to demo pain free and uncompensated running mechanics x10 min on an indoor treadmill  The pt to demo tolerance to a squat at or bellow parallel with uncompensated mechanics x10   The pt to demo strength of R LE within 10% of L LE as demo'd on the biodex machine   The pt to demo a deficit of 10% or less on a triple hop, single leg broad jump and crossover hop compared to non operative LE.   The pt will report full participation in ADLs and IADLs without restrictions related to R knee.         Plan   Plan of care Certification: 6/16/2023 to 12/31/2023.     Outpatient Physical Therapy 2-3 times weekly for 36 weeks to include the following interventions: Electrical Stimulation  , Gait Training, Manual Therapy, Moist Heat/ Ice, Neuromuscular Re-ed, Patient Education, Therapeutic Activities, and Therapeutic Exercise.     Tristin Santana, PT, DPT

## 2023-07-17 ENCOUNTER — CLINICAL SUPPORT (OUTPATIENT)
Dept: REHABILITATION | Facility: HOSPITAL | Age: 23
End: 2023-07-17
Payer: COMMERCIAL

## 2023-07-17 DIAGNOSIS — M62.81 QUADRICEPS WEAKNESS: ICD-10-CM

## 2023-07-17 DIAGNOSIS — M25.661 DECREASED RANGE OF MOTION (ROM) OF RIGHT KNEE: Primary | ICD-10-CM

## 2023-07-17 PROCEDURE — 97112 NEUROMUSCULAR REEDUCATION: CPT

## 2023-07-17 PROCEDURE — 97140 MANUAL THERAPY 1/> REGIONS: CPT

## 2023-07-17 NOTE — PROGRESS NOTES
[facility]   Physical Therapy Treatment Note      Name: Royal Martinez II  Clinic Number: [id number]    Therapy Diagnosis:   Encounter Diagnoses   Name Primary?    Decreased range of motion (ROM) of right knee Yes    Quadriceps weakness      Physician: Rossy Patton*    Visit Date: 7/17/2023  Physician Orders: PT Eval and Treat  Medical Diagnosis from Referral: Complex tear of lateral meniscus of right knee, unspecified whether old or current tear, initial encounter [S83.271A]  Evaluation Date: 6/16/2023  Authorization Period Expiration: 12/31/2023  Plan of Care Expiration: 12/31/2023  Visit # / Visits authorized:10/20      Time In: 1505  Time Out: 1600  Total Billable Time: 55 minutes    DOS: 6/13/2023 (5wks)    PROCEDURES PERFORMED:   Right knee arthroscopic combined inside-out and all inside lateral meniscus repair, complex (CPT 93556-02)  Right knee arthroscopic chondroplasty (patella, lateral femoral condyle, lateral tibial plateau) (CPT 02699)  Right knee arthroscopic limited notchplasty  Right knee arthroscopic plica excision      POSTOPERATIVE PLAN: TTWB x 6 weeks with the brace locked in extension given the size of the tear and the repair required. Flexion 0-90 degrees x 6 weeks. ASA per protocol x 2 weeks for DVT prophylaxis. I'll see the patient back in my clinic in 2 weeks. PT to start this week. Will transition to a lateral  brace after week 6, which he will wear through week 12 to offload the repair site.     Subjective     Pt reports: no change, feeling OK.     He was compliant with home exercise program.  Response to previous treatment: n/a  Functional change: n/a    Pain: 0/10  Location: right knee     Objective      Objective Measures updated at progress report unless specified.   Knee Passive Range of Motion:    Right  Left    Flexion 90 135   Extension 5 hyper 5      Treatment     Royal received the treatments listed below:      therapeutic exercises to  develop strength, endurance, and ROM for 00 minutes including:        manual therapy techniques: Joint mobilizations were applied to the: R knee for 15 minutes, including:  Patella mobs grade III-IV- Superior/ inferior   Fat pad mobs   Scar mobilization lateral incision      neuromuscular re-education activities to improve: muscle reeducation for 40 minutes. The following activities were included:    BFR 80% 30/15/15/15 to improve ms hypertrophy and control   Supine quad set 3 sec hold  LAQ 90-45 deg  SLR 2/0/2/0 tempo  Sidelying ABD no brace  Prone Hip extension no brace     Patient Education and Home Exercises       Education provided:   - reviewed hep and TTWB precaution    Written Home Exercises Provided: yes. Exercises were reviewed and Royal was able to demonstrate them prior to the end of the session.  Royal demonstrated good  understanding of the education provided. See EMR under Patient Instructions for exercises provided during therapy sessions    Assessment   The patient presents to PT today with 5 deg of hyperextension loss that was resolved following fat pad mobilizations. The patient re-educated on how to complete fat pad mobilizations at home. Mild lag initially noted with SLR and required assistance for last round on BFR to complete without a lag. The patient with good tolerance to all therex.     Royal Is progressing well towards his goals.   Pt prognosis is Excellent.     Pt will continue to benefit from skilled outpatient physical therapy to address the deficits listed in the problem list box on initial evaluation, provide pt/family education and to maximize pt's level of independence in the home and community environment.     Pt's spiritual, cultural and educational needs considered and pt agreeable to plan of care and goals.     Anticipated barriers to physical therapy: none  Goals:  Short Term Goals: 8-10 weeks  1. Pt will be compliant with HEP 50% of prescribed amount.   2. The pt to demo  improvement in R knee ROM to equal L knee ROM pain free   3.  The pt to demo good quad set with proper hyperextension moment of the R knee   4. The pt to demo ambualting with elast restricitve AD witout major compensatory pattern R knee for at least 20 feet      Long Term Goals: 24-36 weeks   Pt will be compliant with % of prescribed amount.   The pt to demo pain free and uncompensated running mechanics x10 min on an indoor treadmill  The pt to demo tolerance to a squat at or bellow parallel with uncompensated mechanics x10   The pt to demo strength of R LE within 10% of L LE as demo'd on the biodex machine   The pt to demo a deficit of 10% or less on a triple hop, single leg broad jump and crossover hop compared to non operative LE.   The pt will report full participation in ADLs and IADLs without restrictions related to R knee.         Plan   Plan of care Certification: 6/16/2023 to 12/31/2023.     Outpatient Physical Therapy 2-3 times weekly for 36 weeks to include the following interventions: Electrical Stimulation  , Gait Training, Manual Therapy, Moist Heat/ Ice, Neuromuscular Re-ed, Patient Education, Therapeutic Activities, and Therapeutic Exercise.     Cesilia Vasquez, PT, DPT

## 2023-07-19 ENCOUNTER — CLINICAL SUPPORT (OUTPATIENT)
Dept: REHABILITATION | Facility: HOSPITAL | Age: 23
End: 2023-07-19
Payer: COMMERCIAL

## 2023-07-19 DIAGNOSIS — M62.81 QUADRICEPS WEAKNESS: ICD-10-CM

## 2023-07-19 DIAGNOSIS — M25.661 DECREASED RANGE OF MOTION (ROM) OF RIGHT KNEE: Primary | ICD-10-CM

## 2023-07-19 PROCEDURE — 97140 MANUAL THERAPY 1/> REGIONS: CPT

## 2023-07-19 PROCEDURE — 97112 NEUROMUSCULAR REEDUCATION: CPT

## 2023-07-19 NOTE — PROGRESS NOTES
[facility]   Physical Therapy Treatment Note      Name: Royal Martinez II  Clinic Number: [id number]    Therapy Diagnosis:   Encounter Diagnoses   Name Primary?    Decreased range of motion (ROM) of right knee Yes    Quadriceps weakness      Physician: Rossy Patton*    Visit Date: 7/19/2023  Physician Orders: PT Eval and Treat  Medical Diagnosis from Referral: Complex tear of lateral meniscus of right knee, unspecified whether old or current tear, initial encounter [S83.271A]  Evaluation Date: 6/16/2023  Authorization Period Expiration: 12/31/2023  Plan of Care Expiration: 12/31/2023  Visit # / Visits authorized:10/20      Time In: 1500  Time Out: 1600  Total Billable Time: 60 minutes    DOS: 6/13/2023 (5wks 1 day)    PROCEDURES PERFORMED:   Right knee arthroscopic combined inside-out and all inside lateral meniscus repair, complex (CPT 17738-22)  Right knee arthroscopic chondroplasty (patella, lateral femoral condyle, lateral tibial plateau) (CPT 40223)  Right knee arthroscopic limited notchplasty  Right knee arthroscopic plica excision      POSTOPERATIVE PLAN: TTWB x 6 weeks with the brace locked in extension given the size of the tear and the repair required. Flexion 0-90 degrees x 6 weeks. ASA per protocol x 2 weeks for DVT prophylaxis. I'll see the patient back in my clinic in 2 weeks. PT to start this week. Will transition to a lateral  brace after week 6, which he will wear through week 12 to offload the repair site.     Subjective     Pt reports: The knee feels good, has had no issues    He was compliant with home exercise program.  Response to previous treatment: n/a  Functional change: n/a    Pain: 0/10  Location: right knee     Objective      Objective Measures updated at progress report unless specified.   Knee Passive Range of Motion:    Right  Left    Flexion 90 135   Extension 5 hyper 5      Treatment     Royal received the treatments listed below:       therapeutic exercises to develop strength, endurance, and ROM for 00 minutes including:        manual therapy techniques: Joint mobilizations were applied to the: R knee for 08 minutes, including:  Patella mobs grade III-IV- Superior/ inferior   Fat pad mobs   Scar mobilization lateral incision      neuromuscular re-education activities to improve: muscle reeducation for 52 minutes. The following activities were included:    Knee hinge mobs with quad sets x 20  Clamshell lvl 2  BTB 3 x 10 / side  SLR 4# x 25  Crunch ups lvl1 3 x 10    BFR 80% 30/15/15/15 to improve ms hypertrophy and control   LAQ 90-45 deg  SLR 2/0/2/0 tempo  Sidelying ABD no brace  Prone Hip extension no brace     Patient Education and Home Exercises       Education provided:   - reviewed hep and TTWB precaution    Written Home Exercises Provided: yes. Exercises were reviewed and Royal was able to demonstrate them prior to the end of the session.  Royal demonstrated good  understanding of the education provided. See EMR under Patient Instructions for exercises provided during therapy sessions    Assessment   The patient presented with slightly increased resistance into hyperextension that improved with manual and reinforced with hinge mobs with quad sets. Pt demo'd good quad control of TKE during session after improving knee ext ROM with NM priming.     Royal Is progressing well towards his goals.   Pt prognosis is Excellent.     Pt will continue to benefit from skilled outpatient physical therapy to address the deficits listed in the problem list box on initial evaluation, provide pt/family education and to maximize pt's level of independence in the home and community environment.     Pt's spiritual, cultural and educational needs considered and pt agreeable to plan of care and goals.     Anticipated barriers to physical therapy: none  Goals:  Short Term Goals: 8-10 weeks  1. Pt will be compliant with HEP 50% of prescribed amount. MET  2. The  pt to demo improvement in R knee ROM to equal L knee ROM pain free   3.  The pt to demo good quad set with proper hyperextension moment of the R knee MET  4. The pt to demo ambualting with elast restricitve AD witout major compensatory pattern R knee for at least 20 feet      Long Term Goals: 24-36 weeks   Pt will be compliant with % of prescribed amount.   The pt to demo pain free and uncompensated running mechanics x10 min on an indoor treadmill  The pt to demo tolerance to a squat at or bellow parallel with uncompensated mechanics x10   The pt to demo strength of R LE within 10% of L LE as demo'd on the biodex machine   The pt to demo a deficit of 10% or less on a triple hop, single leg broad jump and crossover hop compared to non operative LE.   The pt will report full participation in ADLs and IADLs without restrictions related to R knee.         Plan   Plan of care Certification: 6/16/2023 to 12/31/2023.     Outpatient Physical Therapy 2-3 times weekly for 36 weeks to include the following interventions: Electrical Stimulation  , Gait Training, Manual Therapy, Moist Heat/ Ice, Neuromuscular Re-ed, Patient Education, Therapeutic Activities, and Therapeutic Exercise.     Charbel Lawrence, PT, DPT

## 2023-07-24 ENCOUNTER — CLINICAL SUPPORT (OUTPATIENT)
Dept: REHABILITATION | Facility: HOSPITAL | Age: 23
End: 2023-07-24
Payer: COMMERCIAL

## 2023-07-24 DIAGNOSIS — M25.661 DECREASED RANGE OF MOTION (ROM) OF RIGHT KNEE: Primary | ICD-10-CM

## 2023-07-24 DIAGNOSIS — M62.81 QUADRICEPS WEAKNESS: ICD-10-CM

## 2023-07-24 PROCEDURE — 97112 NEUROMUSCULAR REEDUCATION: CPT

## 2023-07-24 PROCEDURE — 97140 MANUAL THERAPY 1/> REGIONS: CPT

## 2023-07-24 NOTE — PROGRESS NOTES
CC: Right knee Post-Op    DATE OF PROCEDURE: 6/13/2023   PROCEDURES PERFORMED:   Right knee arthroscopic combined inside-out and all inside lateral meniscus repair, complex (CPT 84977-84)  Right knee arthroscopic chondroplasty (patella, lateral femoral condyle, lateral tibial plateau) (CPT 28562)  Right knee arthroscopic limited notchplasty  Right knee arthroscopic plica excision    Royal Martinez II reports to be doing well 6 wk s/p the above mentioned procedure. Going to PT at the [place] location with Katherine. Seeing good progress daily. Has been compliant with weightbearing restrictions. Ambulating using crutches. He is pain-free. Reports subjective fatigue during PT. Accompanied by his mother.      O: Examination of the right knee shows that the incisions are healing well with small area of scab formation distally.  1+ effusion.  Good quad activation.  Excellent quad strength maintained.  Near full passive hyperextension, active assisted flexion with relative ease to 100°.  Good patellar mobility.  No significant joint line tenderness.  Peroneal nerve function intact.    A/P:  Arthroscopic pictures reviewed with the patient and his mother.  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  The lateral meniscus tissue had a degenerative quality to it.  Nonetheless we are able to get inside out repair.  Discussed the rehab process.  May begin weight-bearing to tolerance.  Transitioned from the T scope brace to the lateral  brace which he will wear for 6 weeks.  May discontinue and wean off crutches with the assistance of his physical therapist.  Focus on functional strengthening and ROM.  No deep squats or high impact activity.  - New PT orders sent to [facility]. Handoff between therapists. Patient is moving August 19th.

## 2023-07-24 NOTE — PROGRESS NOTES
[facility]   Physical Therapy Treatment Note      Name: Royal Martinez II  Clinic Number: [id number]    Therapy Diagnosis:   Encounter Diagnoses   Name Primary?    Decreased range of motion (ROM) of right knee Yes    Quadriceps weakness      Physician: Rossy Patton*    Visit Date: 7/24/2023  Physician Orders: PT Eval and Treat  Medical Diagnosis from Referral: Complex tear of lateral meniscus of right knee, unspecified whether old or current tear, initial encounter [S83.271A]  Evaluation Date: 6/16/2023  Authorization Period Expiration: 12/31/2023  Plan of Care Expiration: 12/31/2023  Visit # / Visits authorized:10/20      Time In: 1500  Time Out: 1600  Total Billable Time: 60 minutes    DOS: 6/13/2023 (5wks 6 day)    PROCEDURES PERFORMED:   Right knee arthroscopic combined inside-out and all inside lateral meniscus repair, complex (CPT 22830-66)  Right knee arthroscopic chondroplasty (patella, lateral femoral condyle, lateral tibial plateau) (CPT 12699)  Right knee arthroscopic limited notchplasty  Right knee arthroscopic plica excision      POSTOPERATIVE PLAN: TTWB x 6 weeks with the brace locked in extension given the size of the tear and the repair required. Flexion 0-90 degrees x 6 weeks. ASA per protocol x 2 weeks for DVT prophylaxis. I'll see the patient back in my clinic in 2 weeks. PT to start this week. Will transition to a lateral  brace after week 6, which he will wear through week 12 to offload the repair site.     Subjective     Pt reports: The knee feels good, has had no issues    He was compliant with home exercise program.  Response to previous treatment: n/a  Functional change: n/a    Pain: 0/10  Location: right knee     Objective      Objective Measures updated at progress report unless specified.   Knee Passive Range of Motion:    Right  Left    Flexion 90 135   Extension 5 hyper 5      Treatment     Royal received the treatments listed below:       therapeutic exercises to develop strength, endurance, and ROM for 00 minutes including:        manual therapy techniques: Joint mobilizations were applied to the: R knee for 09 minutes, including:  Patella mobs grade III-IV- Superior/ inferior   Fat pad mobs   Scar mobilization lateral incision      neuromuscular re-education activities to improve: muscle reeducation for 51 minutes. The following activities were included:    Quad sets x 10 w/ 10s holds  Supine SLR 2.5#   -flex   -ext   -abd    Seated SLR 2.5# x 30  SAQ 2.5# x 30 w/ 3-5s holds    Clamshell lvl 2  BTB 3 x 10 / side  Crunch ups lvl1 x 3  > lvl 2  3 x 10    Mod side planks 3 x 5 / side w/ 3-5s holds  LAQ  with self-superior patellar glide 3 x 10 w/ 3-5s holds    NP  Knee hinge mobs with quad sets x 20  BFR 80% 30/15/15/15 to improve ms hypertrophy and control   LAQ 90-45 deg  SLR 2/0/2/0 tempo  Sidelying ABD no brace  Prone Hip extension no brace     Patient Education and Home Exercises       Education provided:   - reviewed hep and TTWB precaution    Written Home Exercises Provided: yes. Exercises were reviewed and Royal was able to demonstrate them prior to the end of the session.  Royal demonstrated good  understanding of the education provided. See EMR under Patient Instructions for exercises provided during therapy sessions    Assessment   The pt continues to present with free and easy knee flex ROM to 90 with slight stickiness into ext that improves with self-hinge mobs w/ quad sets and manual. Pt demonstrated a slight increase in patella sx during TKE of LAQ today that completely resolves w/ therapist assisted and self- superior patellar glides.    Royal Is progressing well towards his goals.   Pt prognosis is Excellent.     Pt will continue to benefit from skilled outpatient physical therapy to address the deficits listed in the problem list box on initial evaluation, provide pt/family education and to maximize pt's level of independence in  the home and community environment.     Pt's spiritual, cultural and educational needs considered and pt agreeable to plan of care and goals.     Anticipated barriers to physical therapy: none  Goals:  Short Term Goals: 8-10 weeks  1. Pt will be compliant with HEP 50% of prescribed amount. MET  2. The pt to demo improvement in R knee ROM to equal L knee ROM pain free   3.  The pt to demo good quad set with proper hyperextension moment of the R knee MET  4. The pt to demo ambualting with elast restricitve AD witout major compensatory pattern R knee for at least 20 feet      Long Term Goals: 24-36 weeks   Pt will be compliant with % of prescribed amount.   The pt to demo pain free and uncompensated running mechanics x10 min on an indoor treadmill  The pt to demo tolerance to a squat at or bellow parallel with uncompensated mechanics x10   The pt to demo strength of R LE within 10% of L LE as demo'd on the biodex machine   The pt to demo a deficit of 10% or less on a triple hop, single leg broad jump and crossover hop compared to non operative LE.   The pt will report full participation in ADLs and IADLs without restrictions related to R knee.         Plan   Plan of care Certification: 6/16/2023 to 12/31/2023.     Outpatient Physical Therapy 2-3 times weekly for 36 weeks to include the following interventions: Electrical Stimulation  , Gait Training, Manual Therapy, Moist Heat/ Ice, Neuromuscular Re-ed, Patient Education, Therapeutic Activities, and Therapeutic Exercise.     Charbel Lawrence, PT, DPT

## 2023-07-25 ENCOUNTER — OFFICE VISIT (OUTPATIENT)
Dept: SPORTS MEDICINE | Facility: CLINIC | Age: 23
End: 2023-07-25
Payer: COMMERCIAL

## 2023-07-25 VITALS
HEART RATE: 81 BPM | SYSTOLIC BLOOD PRESSURE: 147 MMHG | HEIGHT: 74 IN | BODY MASS INDEX: 30.56 KG/M2 | DIASTOLIC BLOOD PRESSURE: 78 MMHG

## 2023-07-25 DIAGNOSIS — Z98.890 S/P LATERAL MENISCUS REPAIR OF RIGHT KNEE: Primary | ICD-10-CM

## 2023-07-25 PROCEDURE — 3008F PR BODY MASS INDEX (BMI) DOCUMENTED: ICD-10-PCS | Mod: CPTII,S$GLB,, | Performed by: ORTHOPAEDIC SURGERY

## 2023-07-25 PROCEDURE — 1159F PR MEDICATION LIST DOCUMENTED IN MEDICAL RECORD: ICD-10-PCS | Mod: CPTII,S$GLB,, | Performed by: ORTHOPAEDIC SURGERY

## 2023-07-25 PROCEDURE — 3078F DIAST BP <80 MM HG: CPT | Mod: CPTII,S$GLB,, | Performed by: ORTHOPAEDIC SURGERY

## 2023-07-25 PROCEDURE — 3077F PR MOST RECENT SYSTOLIC BLOOD PRESSURE >= 140 MM HG: ICD-10-PCS | Mod: CPTII,S$GLB,, | Performed by: ORTHOPAEDIC SURGERY

## 2023-07-25 PROCEDURE — 99024 PR POST-OP FOLLOW-UP VISIT: ICD-10-PCS | Mod: S$GLB,,, | Performed by: ORTHOPAEDIC SURGERY

## 2023-07-25 PROCEDURE — 3008F BODY MASS INDEX DOCD: CPT | Mod: CPTII,S$GLB,, | Performed by: ORTHOPAEDIC SURGERY

## 2023-07-25 PROCEDURE — 1159F MED LIST DOCD IN RCRD: CPT | Mod: CPTII,S$GLB,, | Performed by: ORTHOPAEDIC SURGERY

## 2023-07-25 PROCEDURE — 3077F SYST BP >= 140 MM HG: CPT | Mod: CPTII,S$GLB,, | Performed by: ORTHOPAEDIC SURGERY

## 2023-07-25 PROCEDURE — 99999 PR PBB SHADOW E&M-EST. PATIENT-LVL III: CPT | Mod: PBBFAC,,, | Performed by: ORTHOPAEDIC SURGERY

## 2023-07-25 PROCEDURE — 99999 PR PBB SHADOW E&M-EST. PATIENT-LVL III: ICD-10-PCS | Mod: PBBFAC,,, | Performed by: ORTHOPAEDIC SURGERY

## 2023-07-25 PROCEDURE — 99024 POSTOP FOLLOW-UP VISIT: CPT | Mod: S$GLB,,, | Performed by: ORTHOPAEDIC SURGERY

## 2023-07-25 PROCEDURE — 3078F PR MOST RECENT DIASTOLIC BLOOD PRESSURE < 80 MM HG: ICD-10-PCS | Mod: CPTII,S$GLB,, | Performed by: ORTHOPAEDIC SURGERY

## 2023-07-26 ENCOUNTER — CLINICAL SUPPORT (OUTPATIENT)
Dept: REHABILITATION | Facility: HOSPITAL | Age: 23
End: 2023-07-26
Payer: COMMERCIAL

## 2023-07-26 DIAGNOSIS — M25.661 DECREASED RANGE OF MOTION (ROM) OF RIGHT KNEE: Primary | ICD-10-CM

## 2023-07-26 DIAGNOSIS — M62.81 QUADRICEPS WEAKNESS: ICD-10-CM

## 2023-07-26 PROCEDURE — 97112 NEUROMUSCULAR REEDUCATION: CPT

## 2023-07-26 PROCEDURE — 97530 THERAPEUTIC ACTIVITIES: CPT

## 2023-07-26 PROCEDURE — 97110 THERAPEUTIC EXERCISES: CPT

## 2023-07-26 NOTE — PROGRESS NOTES
[facility]   Physical Therapy Treatment Note      Name: Royal Martinez II  Clinic Number: [id number]    Therapy Diagnosis:   Encounter Diagnoses   Name Primary?    Decreased range of motion (ROM) of right knee Yes    Quadriceps weakness      Physician: Rossy Patton*    Visit Date: 7/26/2023  Physician Orders: PT Eval and Treat  Medical Diagnosis from Referral: Complex tear of lateral meniscus of right knee, unspecified whether old or current tear, initial encounter [S83.271A]  Evaluation Date: 6/16/2023  Authorization Period Expiration: 12/31/2023  Plan of Care Expiration: 12/31/2023  Visit # / Visits authorized:13/20      Time In: 0900  Time Out: 1000  Total Billable Time: 60 minutes    DOS: 6/13/2023 (6wks 1 day)    PROCEDURES PERFORMED:   Right knee arthroscopic combined inside-out and all inside lateral meniscus repair, complex (CPT 77037-03)  Right knee arthroscopic chondroplasty (patella, lateral femoral condyle, lateral tibial plateau) (CPT 38385)  Right knee arthroscopic limited notchplasty  Right knee arthroscopic plica excision      POSTOPERATIVE PLAN: May begin weight-bearing to tolerance.  Transitioned from the T scope brace to the lateral  brace which he will wear for 6 weeks.  May discontinue and wean off crutches with the assistance of his physical therapist.  Focus on functional strengthening and ROM.  No deep squats or high impact activity.  - New PT orders sent to [facility]. Handoff between therapists. Patient is moving August 19th.     Subjective     Pt reports: The knee feels good and the f/u apt with physician went well    He was compliant with home exercise program.  Response to previous treatment: n/a  Functional change: n/a    Pain: 0/10  Location: right knee     Objective      Pt presents with lateral knee  brace    Pt ambulated with 1 AD with reduced toe off and knee flexion during swing phase that improved with cueing    Knee  Passive Range of Motion:    Right  Left    Flexion 110 135   Extension 5 hyper 5      Treatment     Royal received the treatments listed below:      therapeutic exercises to develop strength, endurance, and ROM for 09 minutes including:    Heel prop 5# 5'  Heel slides x 10 w/ 2-3s holds(pt education reinforced on not pushing through pain barrier and performing short hold at point of gentle stretch)    manual therapy techniques: Joint mobilizations were applied to the: R knee for 04 minutes, including:  Patella mobs grade III-IV- Superior/ inferior at 0, 30, 60* flex  Fat pad mobs at 0, 30, 60* flex      Therapeutic activities to improve functional performance for 18  minutes, including:  Gait training with 1 AD (pt education on knee flexion during swing phase and TKE during heel strike)  Ann Marie drill x 30     neuromuscular re-education activities to improve: muscle reeducation for 29 minutes. The following activities were included:    Quad sets x 10 w/ 10s holds (NM priming)  SLR x 10 (NM priming)    Clamshell lvl 2  BTB 3 x 10 / side  Bridges BTB 3 x 10 (pt education on preventing R pelvic drop)    Partial squats to 45 deg 4 x 10 (pt education on UE support as needed)  DL calf raises 4 x 15 (pt educated on keeping knee straight)    Wobble board 3 x 1min / direction    NP  LAQ  with self-superior patellar glide 3 x 10 w/ 3-5s holds  Mod side planks 3 x 5 / side w/ 3-5s holds  Crunch ups lvl1 x 3  > lvl 2  3 x 10      Patient Education and Home Exercises       Education provided:   - reviewed hep and TTWB precaution    Written Home Exercises Provided: yes. Exercises were reviewed and Royal was able to demonstrate them prior to the end of the session.  Royal demonstrated good  understanding of the education provided. See EMR under Patient Instructions for exercises provided during therapy sessions    Assessment   The pt presented today with slightly increased resistance into ext that improved with heel prop. Pt  educated on gently increasing knee flexion ROM with 2-3s holds at the first resistance barrier while avoiding pain. Pt educated on monitoring pain, ROM, and swelling during progressive increase in WB. Pt educated on toe off and knee flexion during swing phase of gait that improved with cueing and practice.    Royal Is progressing well towards his goals.   Pt prognosis is Excellent.     Pt will continue to benefit from skilled outpatient physical therapy to address the deficits listed in the problem list box on initial evaluation, provide pt/family education and to maximize pt's level of independence in the home and community environment.     Pt's spiritual, cultural and educational needs considered and pt agreeable to plan of care and goals.     Anticipated barriers to physical therapy: none  Goals:  Short Term Goals: 8-10 weeks  1. Pt will be compliant with HEP 50% of prescribed amount. MET  2. The pt to demo improvement in R knee ROM to equal L knee ROM pain free   3.  The pt to demo good quad set with proper hyperextension moment of the R knee MET  4. The pt to demo ambualting with elast restricitve AD witout major compensatory pattern R knee for at least 20 feet      Long Term Goals: 24-36 weeks   Pt will be compliant with % of prescribed amount.   The pt to demo pain free and uncompensated running mechanics x10 min on an indoor treadmill  The pt to demo tolerance to a squat at or bellow parallel with uncompensated mechanics x10   The pt to demo strength of R LE within 10% of L LE as demo'd on the biodex machine   The pt to demo a deficit of 10% or less on a triple hop, single leg broad jump and crossover hop compared to non operative LE.   The pt will report full participation in ADLs and IADLs without restrictions related to R knee.         Plan   Plan of care Certification: 6/16/2023 to 12/31/2023.     Outpatient Physical Therapy 2-3 times weekly for 36 weeks to include the following interventions:  Electrical Stimulation  , Gait Training, Manual Therapy, Moist Heat/ Ice, Neuromuscular Re-ed, Patient Education, Therapeutic Activities, and Therapeutic Exercise.     Charbel Lawrence, PT, DPT

## 2023-08-01 ENCOUNTER — CLINICAL SUPPORT (OUTPATIENT)
Dept: REHABILITATION | Facility: HOSPITAL | Age: 23
End: 2023-08-01
Payer: COMMERCIAL

## 2023-08-01 DIAGNOSIS — M62.81 QUADRICEPS WEAKNESS: ICD-10-CM

## 2023-08-01 DIAGNOSIS — M25.661 DECREASED RANGE OF MOTION (ROM) OF RIGHT KNEE: Primary | ICD-10-CM

## 2023-08-01 PROCEDURE — 97530 THERAPEUTIC ACTIVITIES: CPT

## 2023-08-01 PROCEDURE — 97112 NEUROMUSCULAR REEDUCATION: CPT

## 2023-08-01 NOTE — PROGRESS NOTES
[facility]   Physical Therapy Treatment Note      Name: Royal Martinez II  Clinic Number: [id number]    Therapy Diagnosis:   Encounter Diagnoses   Name Primary?    Decreased range of motion (ROM) of right knee Yes    Quadriceps weakness      Physician: Rossy Patton*    Visit Date: 8/1/2023  Physician Orders: PT Eval and Treat  Medical Diagnosis from Referral: Complex tear of lateral meniscus of right knee, unspecified whether old or current tear, initial encounter [S83.271A]  Evaluation Date: 6/16/2023  Authorization Period Expiration: 12/31/2023  Plan of Care Expiration: 12/31/2023  Visit # / Visits authorized:14/20      Time In: 1700  Time Out: 1800  Total Billable Time: 60 minutes    DOS: 6/13/2023 (7wks 0 day)    PROCEDURES PERFORMED:   Right knee arthroscopic combined inside-out and all inside lateral meniscus repair, complex (CPT 52832-10)  Right knee arthroscopic chondroplasty (patella, lateral femoral condyle, lateral tibial plateau) (CPT 94397)  Right knee arthroscopic limited notchplasty  Right knee arthroscopic plica excision      POSTOPERATIVE PLAN: May begin weight-bearing to tolerance.  Transitioned from the T scope brace to the lateral  brace which he will wear for 6 weeks.  May discontinue and wean off crutches with the assistance of his physical therapist.  Focus on functional strengthening and ROM.  No deep squats or high impact activity.  - New PT orders sent to [facility]. Handoff between therapists. Patient is moving August 19th.     Subjective     Pt reports: The knee feels good and there were no instances of pain or swelling from previous session    He was compliant with home exercise program.  Response to previous treatment: n/a  Functional change: n/a    Pain: 0/10  Location: right knee     Objective      Pt presents with lateral knee  brace    Pt ambulated with 1 AD with reduced toe off and knee flexion during swing phase that  "improved with cueing    Knee Passive Range of Motion:    Right  Left    Flexion 125 135   Extension 5 hyper 5      Treatment     Royal received the treatments listed below:      therapeutic exercises to develop strength, endurance, and ROM for 00 minutes including:      NP  Heel prop 5# 5'  Heel slides x 10 w/ 2-3s holds(pt education reinforced on not pushing through pain barrier and performing short hold at point of gentle stretch)    manual therapy techniques: Joint mobilizations were applied to the: R knee for 00 minutes, including:  Patella mobs grade III-IV- Superior/ inferior at 0, 30, 60* flex  Fat pad mobs at 0, 30, 60* flex      Therapeutic activities to improve functional performance for 28  minutes, including:  Bike 8' for improved cardiovascular and muscular endurance, tissue extensibility, ROM    Zombie squats to chair w/ airex pad to 90 deg flex 3 x 10    Alt step up / lateral step up and over x 10 / side 2" > 3" > 4" > 5"    Gait training no AD (pt education on normalizing gait)     NP  Gait training with 1 AD (pt education on knee flexion during swing phase and TKE during heel strike)  Ann Marie drill x 30     neuromuscular re-education activities to improve: muscle reeducation for 32 minutes. The following activities were included:      Bridges GTB w/ SL kickout 3 x 10 / side (pt education on preventing R pelvic drop)  Lateral band walks GTB 2 laps    Monster walks GTB fwd/bckwd 2 laps    SL balance w/ ant/lat/post reach x 30  SL balance eyes closed 3 x 30s / side    NP  DL calf raises 4 x 15 (pt educated on keeping knee straight)    Wobble board 3 x 1min / direction  Clamshell lvl 2  BTB 3 x 10 / side  Quad sets x 10 w/ 10s holds (NM priming)  SLR x 10 (NM priming)  LAQ  with self-superior patellar glide 3 x 10 w/ 3-5s holds  Mod side planks 3 x 5 / side w/ 3-5s holds  Crunch ups lvl1 x 3  > lvl 2  3 x 10      Patient Education and Home Exercises       Education provided:   - reviewed hep and TTWB " precaution    Written Home Exercises Provided: yes. Exercises were reviewed and Royal was able to demonstrate them prior to the end of the session.  Royal demonstrated good  understanding of the education provided. See EMR under Patient Instructions for exercises provided during therapy sessions    Assessment   The pt presented today with no restrictions into ext ROM and improved knee flex ROM from previous session with no reports of swelling indicating pt tolerated WB progression well. Pt completed session as noted above with no increase in sx. Pt progressed to no AD and pt education reinforced on monitoring pain, swelling, and ROM.    Royal Is progressing well towards his goals.   Pt prognosis is Excellent.     Pt will continue to benefit from skilled outpatient physical therapy to address the deficits listed in the problem list box on initial evaluation, provide pt/family education and to maximize pt's level of independence in the home and community environment.     Pt's spiritual, cultural and educational needs considered and pt agreeable to plan of care and goals.     Anticipated barriers to physical therapy: none  Goals:  Short Term Goals: 8-10 weeks  1. Pt will be compliant with HEP 50% of prescribed amount. MET  2. The pt to demo improvement in R knee ROM to equal L knee ROM pain free   3.  The pt to demo good quad set with proper hyperextension moment of the R knee MET  4. The pt to demo ambualting with elast restricitve AD witout major compensatory pattern R knee for at least 20 feet      Long Term Goals: 24-36 weeks   Pt will be compliant with % of prescribed amount.   The pt to demo pain free and uncompensated running mechanics x10 min on an indoor treadmill  The pt to demo tolerance to a squat at or bellow parallel with uncompensated mechanics x10   The pt to demo strength of R LE within 10% of L LE as demo'd on the biodex machine   The pt to demo a deficit of 10% or less on a triple hop, single  leg broad jump and crossover hop compared to non operative LE.   The pt will report full participation in ADLs and IADLs without restrictions related to R knee.         Plan   Plan of care Certification: 6/16/2023 to 12/31/2023.     Outpatient Physical Therapy 2-3 times weekly for 36 weeks to include the following interventions: Electrical Stimulation  , Gait Training, Manual Therapy, Moist Heat/ Ice, Neuromuscular Re-ed, Patient Education, Therapeutic Activities, and Therapeutic Exercise.     Charbel Lawrence, PT, DPT

## 2023-08-08 ENCOUNTER — CLINICAL SUPPORT (OUTPATIENT)
Dept: REHABILITATION | Facility: HOSPITAL | Age: 23
End: 2023-08-08
Payer: COMMERCIAL

## 2023-08-08 DIAGNOSIS — M62.81 QUADRICEPS WEAKNESS: ICD-10-CM

## 2023-08-08 DIAGNOSIS — M25.661 DECREASED RANGE OF MOTION (ROM) OF RIGHT KNEE: Primary | ICD-10-CM

## 2023-08-08 PROCEDURE — 97110 THERAPEUTIC EXERCISES: CPT

## 2023-08-08 PROCEDURE — 97530 THERAPEUTIC ACTIVITIES: CPT

## 2023-08-08 PROCEDURE — 97112 NEUROMUSCULAR REEDUCATION: CPT

## 2023-08-08 NOTE — PROGRESS NOTES
[facility]   Physical Therapy Treatment Note      Name: Royal Martinze II  Clinic Number: [id number]    Therapy Diagnosis:   Encounter Diagnoses   Name Primary?    Decreased range of motion (ROM) of right knee Yes    Quadriceps weakness      Physician: Rossy Patton*    Visit Date: 8/8/2023  Physician Orders: PT Eval and Treat  Medical Diagnosis from Referral: Complex tear of lateral meniscus of right knee, unspecified whether old or current tear, initial encounter [S83.271A]  Evaluation Date: 6/16/2023  Authorization Period Expiration: 12/31/2023  Plan of Care Expiration: 12/31/2023  Visit # / Visits authorized:14/20      Time In: 1700  Time Out: 1800  Total Billable Time: 60 minutes    DOS: 6/13/2023 (7wks 0 day)    PROCEDURES PERFORMED:   Right knee arthroscopic combined inside-out and all inside lateral meniscus repair, complex (CPT 78741-78)  Right knee arthroscopic chondroplasty (patella, lateral femoral condyle, lateral tibial plateau) (CPT 36868)  Right knee arthroscopic limited notchplasty  Right knee arthroscopic plica excision      POSTOPERATIVE PLAN: May begin weight-bearing to tolerance.  Transitioned from the T scope brace to the lateral  brace which he will wear for 6 weeks.  May discontinue and wean off crutches with the assistance of his physical therapist.  Focus on functional strengthening and ROM.  No deep squats or high impact activity.  - New PT orders sent to [facility]. Handoff between therapists. Patient is moving August 19th.     Subjective     Pt reports: No issues with knee since previous session, HEP is going well    He was compliant with home exercise program.  Response to previous treatment: n/a  Functional change: n/a    Pain: 0/10  Location: right knee     Objective      Pt presents with lateral knee  brace    Pt ambulated with 1 AD with reduced toe off and knee flexion during swing phase that improved with cueing    Knee  "Passive Range of Motion:    Right  Left    Flexion 125 135   Extension 5 hyper 5      Treatment     Royal received the treatments listed below:      therapeutic exercises to develop strength, endurance, and ROM for 14 minutes including:    SL shuttle MVP 3.5c 2 x 15 / side  Sidelying SL shuttle MVP 3.5c 2 x 15 / side    NP  Heel prop 5# 5'  Heel slides x 10 w/ 2-3s holds(pt education reinforced on not pushing through pain barrier and performing short hold at point of gentle stretch)    manual therapy techniques: Joint mobilizations were applied to the: R knee for 00 minutes, including:  Patella mobs grade III-IV- Superior/ inferior at 0, 30, 60* flex  Fat pad mobs at 0, 30, 60* flex      Therapeutic activities to improve functional performance for 19  minutes, including:    Bike 8' for improved cardiovascular and muscular endurance, tissue extensibility, ROM    Squats to 90 deg 26# x 10 > 36# 2 x 10  Deadlifts to airex to limit knee flex to 90 36# 3 x 10  Lat heel taps 3" box 3 x 10 / side      NP  Alt step up / lateral step up and over x 10 / side 2" > 3" > 4" > 5"    Gait training no AD (pt education on normalizing gait)     Gait training with 1 AD (pt education on knee flexion during swing phase and TKE during heel strike)  Ann Marie drill x 30     neuromuscular re-education activities to improve: muscle reeducation for 27 minutes. The following activities were included:    SL bridges 3 x 10 / side  Stationary side lunge shifts (multiple bouts) > Side lunges 3 x 10 / side   Split squats limited ROM (multiple bouts) (pt education on eccentric weight acceptance with R LE in rear)  Walking lunges limited ROM (multiple bouts) (pt education on eccentric weight acceptance with R LE in rear)      NP  Bridges GTB w/ SL kickout 3 x 10 / side (pt education on preventing R pelvic drop)  Lateral band walks GTB 2 laps    Monster walks GTB fwd/bckwd 2 laps    SL balance w/ ant/lat/post reach x 30  SL balance eyes closed 3 x 30s / " side  DL calf raises 4 x 15 (pt educated on keeping knee straight)    Wobble board 3 x 1min / direction  Clamshell lvl 2  BTB 3 x 10 / side  Quad sets x 10 w/ 10s holds (NM priming)  SLR x 10 (NM priming)  LAQ  with self-superior patellar glide 3 x 10 w/ 3-5s holds  Mod side planks 3 x 5 / side w/ 3-5s holds  Crunch ups lvl1 x 3  > lvl 2  3 x 10      Patient Education and Home Exercises       Education provided:   - reviewed hep and TTWB precaution    Written Home Exercises Provided: yes. Exercises were reviewed and Royal was able to demonstrate them prior to the end of the session.  Royal demonstrated good  understanding of the education provided. See EMR under Patient Instructions for exercises provided during therapy sessions    Assessment   The pt tolerated introduction to SL activities well, with only slight patellofemoral sx with eccentric weight acceptance during lunging activities that resolves with cessation of activity. Pt benefited from mod intermittent verbal and visual cues for appropriate hip hinge during side lunges that improved with practice.     Royal Is progressing well towards his goals.   Pt prognosis is Excellent.     Pt will continue to benefit from skilled outpatient physical therapy to address the deficits listed in the problem list box on initial evaluation, provide pt/family education and to maximize pt's level of independence in the home and community environment.     Pt's spiritual, cultural and educational needs considered and pt agreeable to plan of care and goals.     Anticipated barriers to physical therapy: none  Goals:  Short Term Goals: 8-10 weeks  1. Pt will be compliant with HEP 50% of prescribed amount. MET  2. The pt to demo improvement in R knee ROM to equal L knee ROM pain free   3.  The pt to demo good quad set with proper hyperextension moment of the R knee MET  4. The pt to demo ambualting with elast restricitve AD witout major compensatory pattern R knee for at least 20  feet      Long Term Goals: 24-36 weeks   Pt will be compliant with % of prescribed amount.   The pt to demo pain free and uncompensated running mechanics x10 min on an indoor treadmill  The pt to demo tolerance to a squat at or bellow parallel with uncompensated mechanics x10   The pt to demo strength of R LE within 10% of L LE as demo'd on the biodex machine   The pt to demo a deficit of 10% or less on a triple hop, single leg broad jump and crossover hop compared to non operative LE.   The pt will report full participation in ADLs and IADLs without restrictions related to R knee.         Plan   Plan of care Certification: 6/16/2023 to 12/31/2023.     Outpatient Physical Therapy 2-3 times weekly for 36 weeks to include the following interventions: Electrical Stimulation  , Gait Training, Manual Therapy, Moist Heat/ Ice, Neuromuscular Re-ed, Patient Education, Therapeutic Activities, and Therapeutic Exercise.     Charbel Lawrence, PT, DPT

## 2023-08-15 ENCOUNTER — CLINICAL SUPPORT (OUTPATIENT)
Dept: REHABILITATION | Facility: HOSPITAL | Age: 23
End: 2023-08-15
Payer: COMMERCIAL

## 2023-08-15 DIAGNOSIS — M25.661 DECREASED RANGE OF MOTION (ROM) OF RIGHT KNEE: Primary | ICD-10-CM

## 2023-08-15 DIAGNOSIS — M62.81 QUADRICEPS WEAKNESS: ICD-10-CM

## 2023-08-15 PROCEDURE — 97530 THERAPEUTIC ACTIVITIES: CPT

## 2023-08-15 PROCEDURE — 97112 NEUROMUSCULAR REEDUCATION: CPT

## 2023-08-15 NOTE — PROGRESS NOTES
[facility]   Physical Therapy Treatment Note      Name: Royal Martinez II  Clinic Number: [id number]    Therapy Diagnosis:   Encounter Diagnoses   Name Primary?    Decreased range of motion (ROM) of right knee Yes    Quadriceps weakness      Physician: Rossy Patton*    Visit Date: 8/15/2023  Physician Orders: PT Eval and Treat  Medical Diagnosis from Referral: Complex tear of lateral meniscus of right knee, unspecified whether old or current tear, initial encounter [S83.271A]  Evaluation Date: 6/16/2023  Authorization Period Expiration: 12/31/2023  Plan of Care Expiration: 12/31/2023  Visit # / Visits authorized:16/20      Time In: 1700  Time Out: 1800  Total Billable Time: 60 minutes    DOS: 6/13/2023 (9wks 0 day)    PROCEDURES PERFORMED:   Right knee arthroscopic combined inside-out and all inside lateral meniscus repair, complex (CPT 32253-06)  Right knee arthroscopic chondroplasty (patella, lateral femoral condyle, lateral tibial plateau) (CPT 56634)  Right knee arthroscopic limited notchplasty  Right knee arthroscopic plica excision      POSTOPERATIVE PLAN: May begin weight-bearing to tolerance.  Transitioned from the T scope brace to the lateral  brace which he will wear for 6 weeks.  May discontinue and wean off crutches with the assistance of his physical therapist.  Focus on functional strengthening and ROM.  No deep squats or high impact activity.  - New PT orders sent to [facility]. Handoff between therapists. Patient is moving August 19th.     Subjective     Pt reports: That the knee is feeling good, has been working out at the gym to get stronger    He was compliant with home exercise program.  Response to previous treatment: n/a  Functional change: n/a    Pain: 0/10  Location: right knee     Objective      Pt presents with lateral knee  brace    Knee Passive Range of Motion:    Right  Left    Flexion 130 135   Extension 5 hyper 5     "  Treatment     Royal received the treatments listed below:      therapeutic exercises to develop strength, endurance, and ROM for 00 minutes including:      NP  SL shuttle MVP 3.5c 2 x 15 / side  Sidelying SL shuttle MVP 3.5c 2 x 15 / side  Heel prop 5# 5'  Heel slides x 10 w/ 2-3s holds(pt education reinforced on not pushing through pain barrier and performing short hold at point of gentle stretch)    manual therapy techniques: Joint mobilizations were applied to the: R knee for 00 minutes, including:  Patella mobs grade III-IV- Superior/ inferior at 0, 30, 60* flex  Fat pad mobs at 0, 30, 60* flex      Therapeutic activities to improve functional performance for 21  minutes, including:    Bike 8' for improved cardiovascular and muscular endurance, tissue extensibility, ROM    Mongolian split squats to 90 deg 3 x 10 / side      Squats to 90 deg 36# 3 x 10 (pt education reinforced on even WB through tripod of foot)  Deadlifts to airex to limit knee flex to 90 40# 3 x 10 (pt education reinforced on open book position)      NP  Alt step up / lateral step up and over x 10 / side 2" > 3" > 4" > 5"    Gait training no AD (pt education on normalizing gait)     Gait training with 1 AD (pt education on knee flexion during swing phase and TKE during heel strike)  Ann Marie drill x 30     neuromuscular re-education activities to improve: muscle reeducation for 39 minutes. The following activities were included:    Walking lunges assessment (no knee sx reported, difficulty w/ torso control)  SL bridges 3 x 10 / side  Side lunges 3 x 10 / side   Split squats to 90 deg 3 x 10 / side (pt education on eccentric weight acceptance with R LE in rear)  Lat heel taps 3" box 3 x 10 / side (pt education on preventing hip drop / rotation)     steps to SL balance 3 x 15 / side    NP  Bridges GTB w/ SL kickout 3 x 10 / side (pt education on preventing R pelvic drop)  Lateral band walks GTB 2 laps    Monster walks GTB fwd/bckwd 2 " laps    SL balance w/ ant/lat/post reach x 30  SL balance eyes closed 3 x 30s / side  DL calf raises 4 x 15 (pt educated on keeping knee straight)    Wobble board 3 x 1min / direction  Clamshell lvl 2  BTB 3 x 10 / side  Quad sets x 10 w/ 10s holds (NM priming)  SLR x 10 (NM priming)  LAQ  with self-superior patellar glide 3 x 10 w/ 3-5s holds  Mod side planks 3 x 5 / side w/ 3-5s holds  Crunch ups lvl1 x 3  > lvl 2  3 x 10      Patient Education and Home Exercises       Education provided:   - reviewed hep and TTWB precaution    Written Home Exercises Provided: yes. Exercises were reviewed and Royal was able to demonstrate them prior to the end of the session.  Royal demonstrated good  understanding of the education provided. See EMR under Patient Instructions for exercises provided during therapy sessions    Assessment   The pt presented today with no knee sx during walking lunge assessment, although demo's difficulty with trunk control and eccentric weight acceptance of quad w/ R LE in rear. Pt continues to tolerate progressive increases in SL volume with no pain, although pt presents with a hip drop during lateral heel taps that improves with practice.     Royal Is progressing well towards his goals.   Pt prognosis is Excellent.     Pt will continue to benefit from skilled outpatient physical therapy to address the deficits listed in the problem list box on initial evaluation, provide pt/family education and to maximize pt's level of independence in the home and community environment.     Pt's spiritual, cultural and educational needs considered and pt agreeable to plan of care and goals.     Anticipated barriers to physical therapy: none  Goals:  Short Term Goals: 8-10 weeks  1. Pt will be compliant with HEP 50% of prescribed amount. MET  2. The pt to demo improvement in R knee ROM to equal L knee ROM pain free   3.  The pt to demo good quad set with proper hyperextension moment of the R knee MET  4. The pt to  demo ambualting with elast restricitve AD witout major compensatory pattern R knee for at least 20 feet      Long Term Goals: 24-36 weeks   Pt will be compliant with % of prescribed amount.   The pt to demo pain free and uncompensated running mechanics x10 min on an indoor treadmill  The pt to demo tolerance to a squat at or bellow parallel with uncompensated mechanics x10   The pt to demo strength of R LE within 10% of L LE as demo'd on the biodex machine   The pt to demo a deficit of 10% or less on a triple hop, single leg broad jump and crossover hop compared to non operative LE.   The pt will report full participation in ADLs and IADLs without restrictions related to R knee.         Plan   Plan of care Certification: 6/16/2023 to 12/31/2023.     Outpatient Physical Therapy 2-3 times weekly for 36 weeks to include the following interventions: Electrical Stimulation  , Gait Training, Manual Therapy, Moist Heat/ Ice, Neuromuscular Re-ed, Patient Education, Therapeutic Activities, and Therapeutic Exercise.     Charbel Lawrence, PT, DPT

## 2023-08-21 ENCOUNTER — PATIENT MESSAGE (OUTPATIENT)
Dept: SPORTS MEDICINE | Facility: CLINIC | Age: 23
End: 2023-08-21
Payer: COMMERCIAL

## 2023-09-11 ENCOUNTER — CLINICAL SUPPORT (OUTPATIENT)
Dept: REHABILITATION | Facility: HOSPITAL | Age: 23
End: 2023-09-11
Payer: COMMERCIAL

## 2023-09-11 DIAGNOSIS — M25.661 DECREASED RANGE OF MOTION (ROM) OF RIGHT KNEE: Primary | ICD-10-CM

## 2023-09-11 DIAGNOSIS — M62.81 QUADRICEPS WEAKNESS: ICD-10-CM

## 2023-09-11 DIAGNOSIS — Z98.890 S/P LATERAL MENISCUS REPAIR OF RIGHT KNEE: ICD-10-CM

## 2023-09-11 PROCEDURE — 97140 MANUAL THERAPY 1/> REGIONS: CPT | Performed by: PHYSICAL THERAPIST

## 2023-09-11 PROCEDURE — 97110 THERAPEUTIC EXERCISES: CPT | Performed by: PHYSICAL THERAPIST

## 2023-09-11 NOTE — PROGRESS NOTES
[facility]   Physical Therapy Treatment Note      Name: Royal Martinez II  Clinic Number: [id number]    Therapy Diagnosis:   Encounter Diagnoses   Name Primary?    S/P lateral meniscus repair of right knee     Decreased range of motion (ROM) of right knee Yes    Quadriceps weakness      Physician: ALEXANDER Emanuel MD    Visit Date: 9/11/2023  Physician Orders: PT Eval and Treat  Medical Diagnosis from Referral: Complex tear of lateral meniscus of right knee, unspecified whether old or current tear, initial encounter [S83.271A]  Evaluation Date: 6/16/2023  Authorization Period Expiration: 12/31/2023  Plan of Care Expiration: 12/31/2023  Visit # / Visits authorized:16/20      Time In: 1700  Time Out: 1800  Total Billable Time: 60 minutes    DOS: 6/13/2023   3 months on 9/13  6 months on 12/13    PROCEDURES PERFORMED:   Right knee arthroscopic combined inside-out and all inside lateral meniscus repair, complex (CPT 32007-15)  Right knee arthroscopic chondroplasty (patella, lateral femoral condyle, lateral tibial plateau) (CPT 74598)  Right knee arthroscopic limited notchplasty  Right knee arthroscopic plica excision      POSTOPERATIVE PLAN: May begin weight-bearing to tolerance.  Transitioned from the T scope brace to the lateral  brace which he will wear for 6 weeks.  May discontinue and wean off crutches with the assistance of his physical therapist.  Focus on functional strengthening and ROM.  No deep squats or high impact activity.  - New PT orders sent to [facility]. Handoff between therapists. Patient is moving August 19th.     Subjective     Pt reports: 9/11- he hasn't been having any problems with the knee. He has been workout out at the gym with    He was compliant with home exercise program.  Response to previous treatment: n/a  Functional change: n/a    Pain: 0/10  Location: right knee     Objective      Pt presents with lateral knee  brace    Knee Passive  Range of Motion:    Right  Left    Flexion 130 135   Extension 5 hyper 5      Strength  Hip abduction   L- 34.1  R- 28.2#  LSI- 82.7    Knee flexion  L- 61.8  R- 65.4  LSI- 106%    Knee extension  L- 120.2  R- 98.2  LSI- 81.7#    Treatment     Royal received the treatments listed below:      Royal received the following manual therapy techniques: Joint mobilizations and Soft tissue Mobilization were applied to the: lower quarter for 8 minutes, including:     Performed Today    Mobility assessment x Impaired ankle mobility  Impaired knee flexion range of motion  Impaired hip mobility   Joint mobilization X  x Ankle manipulation  Hip manipulation   Soft tissue mobilization     Dry needling       Plan for Next Visit: Continue as indicated       Royal received therapeutic exercises to develop strength, endurance, ROM, flexibility, posture, and core stabilization for 45 minutes including:    Strength and range of motion assessment x 30  Discussion regarding home program x 10 minutes     Performed Today    Strength / range of motion assessment          Flexibility/mobility/ROM     Hip mobs x BTB 20x   Ankle mobs x BTB 20x        Strength            Plan for Next Visit: See above       Royal participated in neuromuscular re-education activities to improve: Balance, Coordination, Kinesthetic, Sense, Proprioception, and Posture for 00 minutes. The following activities were included:       Performed Today    Motor control/posture               Balance/proprioception                 Plan for Next Visit: See above       Royal participated in dynamic functional therapeutic activities to improve functional performance for 00  minutes, including:       Performed Today    Functional strength                    Power            Plan for Next Visit: See above       Patient Education and Home Exercises       Education provided:   - Emphasis on strength exercises at the gym  - continued care taken with loaded deep knee  flexion    Written Home Exercises Provided: yes. Exercises were reviewed and Royal was able to demonstrate them prior to the end of the session.  Royal demonstrated good  understanding of the education provided. See EMR under Patient Instructions for exercises provided during therapy sessions    Assessment     Royal Martinez II tolerated PT session well with no  complaints of pain or discomfort. Patient continues to have impaired LE strength and biomechanics. Objective findings are improving with range of motion, joint mobility, functional strength, and functional activity performance.  Updated home exercises were issued during today's visit. Royal demonstrated good understanding of new exercises and will continue to progress at home until next follow-up.         Royal Is progressing well towards his goals.   Pt prognosis is Excellent.     Pt will continue to benefit from skilled outpatient physical therapy to address the deficits listed in the problem list box on initial evaluation, provide pt/family education and to maximize pt's level of independence in the home and community environment.     Pt's spiritual, cultural and educational needs considered and pt agreeable to plan of care and goals.     Anticipated barriers to physical therapy: none  Goals:  Short Term Goals: 8-10 weeks  1. Pt will be compliant with HEP 50% of prescribed amount. MET  2. The pt to demo improvement in R knee ROM to equal L knee ROM pain free   3.  The pt to demo good quad set with proper hyperextension moment of the R knee MET  4. The pt to demo ambualting with elast restricitve AD witout major compensatory pattern R knee for at least 20 feet      Long Term Goals: 24-36 weeks   Pt will be compliant with % of prescribed amount.   The pt to demo pain free and uncompensated running mechanics x10 min on an indoor treadmill  The pt to demo tolerance to a squat at or bellow parallel with uncompensated mechanics x10   The pt to demo strength  of R LE within 10% of L LE as demo'd on the biodex machine   The pt to demo a deficit of 10% or less on a triple hop, single leg broad jump and crossover hop compared to non operative LE.   The pt will report full participation in ADLs and IADLs without restrictions related to R knee.         Plan   Plan of care Certification: 6/16/2023 to 12/31/2023.     Outpatient Physical Therapy 2-3 times weekly for 36 weeks to include the following interventions: Electrical Stimulation  , Gait Training, Manual Therapy, Moist Heat/ Ice, Neuromuscular Re-ed, Patient Education, Therapeutic Activities, and Therapeutic Exercise.     Lionel Anand, PT, DPT

## 2023-09-27 ENCOUNTER — CLINICAL SUPPORT (OUTPATIENT)
Dept: REHABILITATION | Facility: HOSPITAL | Age: 23
End: 2023-09-27
Payer: COMMERCIAL

## 2023-09-27 DIAGNOSIS — M62.81 QUADRICEPS WEAKNESS: ICD-10-CM

## 2023-09-27 DIAGNOSIS — M25.661 DECREASED RANGE OF MOTION (ROM) OF RIGHT KNEE: Primary | ICD-10-CM

## 2023-09-27 PROCEDURE — 97112 NEUROMUSCULAR REEDUCATION: CPT | Performed by: PHYSICAL THERAPIST

## 2023-09-27 PROCEDURE — 97530 THERAPEUTIC ACTIVITIES: CPT | Performed by: PHYSICAL THERAPIST

## 2023-09-27 PROCEDURE — 97110 THERAPEUTIC EXERCISES: CPT | Performed by: PHYSICAL THERAPIST

## 2023-09-27 PROCEDURE — 97140 MANUAL THERAPY 1/> REGIONS: CPT | Performed by: PHYSICAL THERAPIST

## 2023-09-27 NOTE — PROGRESS NOTES
[facility]   Physical Therapy Treatment Note      Name: Royal Martinez II  Clinic Number: [id number]    Therapy Diagnosis:   Encounter Diagnoses   Name Primary?    Decreased range of motion (ROM) of right knee Yes    Quadriceps weakness        Physician: ALEXANDER Emanuel MD    Visit Date: 9/27/2023  Physician Orders: PT Eval and Treat  Medical Diagnosis from Referral: Complex tear of lateral meniscus of right knee, unspecified whether old or current tear, initial encounter [S83.271A]  Evaluation Date: 6/16/2023  Authorization Period Expiration: 12/31/2023  Plan of Care Expiration: 12/31/2023  Visit # / Visits authorized:1/25      Time In: 1700  Time Out: 1800  Total Billable Time: 38 minutes    DOS: 6/13/2023   3 months on 9/13  6 months on 12/13    PROCEDURES PERFORMED:   Right knee arthroscopic combined inside-out and all inside lateral meniscus repair, complex (CPT 23048-02)  Right knee arthroscopic chondroplasty (patella, lateral femoral condyle, lateral tibial plateau) (CPT 89232)  Right knee arthroscopic limited notchplasty  Right knee arthroscopic plica excision      POSTOPERATIVE PLAN: May begin weight-bearing to tolerance.  Transitioned from the T scope brace to the lateral  brace which he will wear for 6 weeks.  May discontinue and wean off crutches with the assistance of his physical therapist.  Focus on functional strengthening and ROM.  No deep squats or high impact activity.  - New PT orders sent to [facility]. Handoff between therapists. Patient is moving August 19th.     Subjective     Pt reports: 9/27- Working out at the gym has been going well. He did legs yesterday and the legs are sore today.    He was compliant with home exercise program.  Response to previous treatment: n/a  Functional change: n/a    Pain: 0/10  Location: right knee     Objective      Pt presents with lateral knee  brace    Knee Passive Range of Motion:    Right  Left   "  Flexion 130 135   Extension 5 hyper 5      Strength  Hip abduction   L- 34.1  R- 28.2#  LSI- 82.7    Knee flexion  L- 61.8  R- 65.4  LSI- 106%    Knee extension  L- 120.2  R- 98.2  LSI- 81.7%    Treatment     Royal received the treatments listed below:      Royal received the following manual therapy techniques: Joint mobilizations and Soft tissue Mobilization were applied to the: lower quarter for 8 minutes, including:     Performed Today    Mobility assessment x Impaired ankle mobility  Impaired knee flexion range of motion  Impaired hip mobility   Joint mobilization X  X  x Ankle manipulation  Hip manipulation  Knee flexion mobs   Soft tissue mobilization     Dry needling       Plan for Next Visit: Continue as indicated       Royal received therapeutic exercises to develop strength, endurance, ROM, flexibility, posture, and core stabilization for 10 minutes including:       Performed Today    Strength / range of motion assessment     Bike x 5 minutes   Flexibility/mobility/ROM     Hip mobs  BTB 20x   Ankle mobs x BTB 20x        Strength            Plan for Next Visit: See above       Royal participated in neuromuscular re-education activities to improve: Balance, Coordination, Kinesthetic, Sense, Proprioception, and Posture for 10 minutes. The following activities were included:       Performed Today    Motor control/posture     Anterior step down x 4" 3 x 15   MOBO RDL x 3 x10        Balance/proprioception                 Plan for Next Visit: See above       Royal participated in dynamic functional therapeutic activities to improve functional performance for 10  minutes, including:       Performed Today    Functional strength                    Power     Drop squat x 3 x 10   Ankle pops x 3 x 30s     Plan for Next Visit: See above       Patient Education and Home Exercises       Education provided:   - Emphasis on strength exercises at the gym  - continued care taken with loaded deep knee flexion    Written " Home Exercises Provided: yes. Exercises were reviewed and Royal was able to demonstrate them prior to the end of the session.  Royal demonstrated good  understanding of the education provided. See EMR under Patient Instructions for exercises provided during therapy sessions    Assessment     9/27- Royal Martinez II tolerated PT session well with no  complaints of pain or discomfort. Patient continues to have impaired LE strength and biomechanics. Objective findings are improving with range of motion, joint mobility, functional strength, and functional activity performance.  We discussed potential of progressing into running and light plyos in the coming weeks. We will hold until follow up with Dr. Emanuel. Updated home exercises were issued during today's visit. Royal demonstrated good understanding of new exercises and will continue to progress at home until next follow-up.         Royal Is progressing well towards his goals.   Pt prognosis is Excellent.     Pt will continue to benefit from skilled outpatient physical therapy to address the deficits listed in the problem list box on initial evaluation, provide pt/family education and to maximize pt's level of independence in the home and community environment.     Pt's spiritual, cultural and educational needs considered and pt agreeable to plan of care and goals.     Anticipated barriers to physical therapy: none  Goals:  Short Term Goals: 8-10 weeks  1. Pt will be compliant with HEP 50% of prescribed amount. MET  2. The pt to demo improvement in R knee ROM to equal L knee ROM pain free   3.  The pt to demo good quad set with proper hyperextension moment of the R knee MET  4. The pt to demo ambualting with elast restricitve AD witout major compensatory pattern R knee for at least 20 feet      Long Term Goals: 24-36 weeks   Pt will be compliant with % of prescribed amount.   The pt to demo pain free and uncompensated running mechanics x10 min on an indoor  treadmill  The pt to demo tolerance to a squat at or bellow parallel with uncompensated mechanics x10   The pt to demo strength of R LE within 10% of L LE as demo'd on the biodex machine   The pt to demo a deficit of 10% or less on a triple hop, single leg broad jump and crossover hop compared to non operative LE.   The pt will report full participation in ADLs and IADLs without restrictions related to R knee.         Plan   Plan of care Certification: 6/16/2023 to 12/31/2023.     Outpatient Physical Therapy 2-3 times weekly for 36 weeks to include the following interventions: Electrical Stimulation  , Gait Training, Manual Therapy, Moist Heat/ Ice, Neuromuscular Re-ed, Patient Education, Therapeutic Activities, and Therapeutic Exercise.     Lionel Anand, PT, DPT

## 2023-10-05 NOTE — PROGRESS NOTES
CC: Right knee post-op     DATE OF PROCEDURE: 6/13/2023   PROCEDURES PERFORMED:   Right knee arthroscopic combined inside-out and all inside lateral meniscus repair, complex (CPT 27891-38)  Right knee arthroscopic chondroplasty (patella, lateral femoral condyle, lateral tibial plateau) (CPT 25627)  Right knee arthroscopic limited notchplasty  Right knee arthroscopic plica excision    Royal Martinez II reports to be doing well just under 4 mos s/p the above mentioned procedure. Going to PT at the [place] location with Katherine. Seeing good progress daily.  Reports no pain.  No complaints.  Comes in today with his lateral  brace.    REVIEW OF SYSTEMS:   Constitution: Negative. Negative for chills, fever and night sweats.    Hematologic/Lymphatic: Negative for bleeding problem. Does not bruise/bleed easily.   Skin: Negative for dry skin, itching and rash.   Musculoskeletal: Negative for falls.  Negative for right knee pain and muscle weakness.     All other review of symptoms were reviewed and found to be noncontributory.     PAST MEDICAL HISTORY:   Past Medical History:   Diagnosis Date    ALLERGIC RHINITIS     Allergic rhinitis 11/23/2020    Asthma, currently inactive     Childhood asthma 11/23/2020    Obesity     Overweight(278.02)     Patellar tendonitis of left knee      PAST SURGICAL HISTORY:   Past Surgical History:   Procedure Laterality Date    ARTHROSCOPIC CHONDROPLASTY OF KNEE JOINT Right 6/13/2023    Procedure: ARTHROSCOPY, KNEE, WITH CHONDROPLASTY;  Surgeon: ALEXANDER Emanuel MD;  Location: [facility] OR;  Service: Orthopedics;  Laterality: Right;    ARTHROSCOPY,KNEE,WITH MENISCUS REPAIR Right 6/13/2023    Procedure: ARTHROSCOPY KNEE ,WITH MENISCUS REPAIR;  Surgeon: ALEXANDER Emanuel MD;  Location: [facility] OR;  Service: Orthopedics;  Laterality: Right;  Regional w/Catheter, Adductor, 0.2% Ropivacaine    CIRCUMCISION       FAMILY HISTORY:   Family History   Problem Relation Age of Onset    Alcohol  abuse Paternal Grandfather         now     Hypertension Father     Obesity Father     Hypertension Maternal Grandfather     Hypertension Mother     Breast cancer Mother 40    Diabetes Unknown         maternal side    Acne Maternal Aunt     Psoriasis Cousin     Diabetes Maternal Uncle     Depression Neg Hx     Suicidality Neg Hx     Eczema Neg Hx     Melanoma Neg Hx     Lupus Neg Hx      SOCIAL HISTORY:   Social History     Socioeconomic History    Marital status: Single   Occupational History    Occupation: Student   Tobacco Use    Smoking status: Never    Smokeless tobacco: Never   Substance and Sexual Activity    Alcohol use: No    Drug use: Never    Sexual activity: Not Currently   Social History Narrative    Lives with parents and sib, no pets.    11th grade- [employer].                                  MEDICATIONS:     Current Outpatient Medications:     aspirin (ECOTRIN) 81 MG EC tablet, Take 1 tablet (81 mg total) by mouth 2 (two) times a day. for 14 days, Disp: 28 tablet, Rfl: 0    fluticasone propionate (FLONASE) 50 mcg/actuation nasal spray, 1 spray (50 mcg total) by Each Nostril route 2 (two) times daily as needed for Rhinitis (nasal congestion). (Patient not taking: Reported on 2023), Disp: 15 g, Rfl: 0    ibuprofen (ADVIL,MOTRIN) 800 MG tablet, Take 1 tablet (800 mg total) by mouth every 6 (six) hours as needed for Pain. (Patient not taking: Reported on 2023), Disp: 20 tablet, Rfl: 0    ondansetron (ZOFRAN-ODT) 4 MG TbDL, Take 1 tablet (4 mg total) by mouth every 8 (eight) hours as needed (nausea). (Patient not taking: Reported on 2023), Disp: 30 tablet, Rfl: 0    oxyCODONE (ROXICODONE) 5 MG immediate release tablet, Take 1-2 tablets (5-10 mg total) by mouth every 4 to 6 hours as needed for Pain. (Patient not taking: Reported on 2023), Disp: 28 tablet, Rfl: 0    ALLERGIES:   Review of patient's allergies indicates:  No Known Allergies     PHYSICAL  "EXAMINATION:  BP (!) 153/81   Pulse 92   Ht 6' 2" (1.88 m)   Wt 106.6 kg (235 lb)   BMI 30.17 kg/m²   General: Well-developed well-nourished 23 y.o. malein no acute distress   Cardiovascular: Regular rhythm by palpation of distal pulse, normal color and temperature, no concerning varicosities on symptomatic side   Lungs: No labored breathing or wheezing appreciated   Neuro: Alert and oriented ×3   Psychiatric: well oriented to person, place and time, demonstrates normal mood and affect   Skin: No rashes, lesions or ulcers, normal temperature, turgor, and texture on involved extremity    Ortho/SPM Exam  Examination of the right knee shows that the incisions are well healed.  No effusion.  Good quadriceps bulk and strength.  Full range of motion, symmetric to the other side.  No pain laterally with forced flexion and extension.  No lateral joint line tenderness.    IMAGING:  None.    ASSESSMENT:      ICD-10-CM ICD-9-CM   1. Quadriceps weakness  M62.81 728.87     PLAN:     Clinically doing quite well.  May discontinue the lateral  brace.  Progress activity.  Begin running progression program now.  No deep squats or heavy lifting or sports activity until cleared by me in physical therapy to do so.  May target a 6-month return to sport timeframe.  I will see him back in 2 months.  He will need [employer] sport cord and Biodex testing prior to return to sport      Procedures         "

## 2023-10-09 ENCOUNTER — OFFICE VISIT (OUTPATIENT)
Dept: SPORTS MEDICINE | Facility: CLINIC | Age: 23
End: 2023-10-09
Payer: COMMERCIAL

## 2023-10-09 VITALS
HEIGHT: 74 IN | BODY MASS INDEX: 30.16 KG/M2 | HEART RATE: 92 BPM | WEIGHT: 235 LBS | DIASTOLIC BLOOD PRESSURE: 81 MMHG | SYSTOLIC BLOOD PRESSURE: 153 MMHG

## 2023-10-09 DIAGNOSIS — M62.81 QUADRICEPS WEAKNESS: Primary | ICD-10-CM

## 2023-10-09 PROCEDURE — 1159F PR MEDICATION LIST DOCUMENTED IN MEDICAL RECORD: ICD-10-PCS | Mod: CPTII,S$GLB,, | Performed by: ORTHOPAEDIC SURGERY

## 2023-10-09 PROCEDURE — 99999 PR PBB SHADOW E&M-EST. PATIENT-LVL III: CPT | Mod: PBBFAC,,, | Performed by: ORTHOPAEDIC SURGERY

## 2023-10-09 PROCEDURE — 3077F SYST BP >= 140 MM HG: CPT | Mod: CPTII,S$GLB,, | Performed by: ORTHOPAEDIC SURGERY

## 2023-10-09 PROCEDURE — 3077F PR MOST RECENT SYSTOLIC BLOOD PRESSURE >= 140 MM HG: ICD-10-PCS | Mod: CPTII,S$GLB,, | Performed by: ORTHOPAEDIC SURGERY

## 2023-10-09 PROCEDURE — 3008F BODY MASS INDEX DOCD: CPT | Mod: CPTII,S$GLB,, | Performed by: ORTHOPAEDIC SURGERY

## 2023-10-09 PROCEDURE — 1159F MED LIST DOCD IN RCRD: CPT | Mod: CPTII,S$GLB,, | Performed by: ORTHOPAEDIC SURGERY

## 2023-10-09 PROCEDURE — 3079F DIAST BP 80-89 MM HG: CPT | Mod: CPTII,S$GLB,, | Performed by: ORTHOPAEDIC SURGERY

## 2023-10-09 PROCEDURE — 99999 PR PBB SHADOW E&M-EST. PATIENT-LVL III: ICD-10-PCS | Mod: PBBFAC,,, | Performed by: ORTHOPAEDIC SURGERY

## 2023-10-09 PROCEDURE — 3079F PR MOST RECENT DIASTOLIC BLOOD PRESSURE 80-89 MM HG: ICD-10-PCS | Mod: CPTII,S$GLB,, | Performed by: ORTHOPAEDIC SURGERY

## 2023-10-09 PROCEDURE — 99213 OFFICE O/P EST LOW 20 MIN: CPT | Mod: S$GLB,,, | Performed by: ORTHOPAEDIC SURGERY

## 2023-10-09 PROCEDURE — 3008F PR BODY MASS INDEX (BMI) DOCUMENTED: ICD-10-PCS | Mod: CPTII,S$GLB,, | Performed by: ORTHOPAEDIC SURGERY

## 2023-10-09 PROCEDURE — 99213 PR OFFICE/OUTPT VISIT, EST, LEVL III, 20-29 MIN: ICD-10-PCS | Mod: S$GLB,,, | Performed by: ORTHOPAEDIC SURGERY

## 2023-10-11 ENCOUNTER — CLINICAL SUPPORT (OUTPATIENT)
Dept: REHABILITATION | Facility: HOSPITAL | Age: 23
End: 2023-10-11
Payer: COMMERCIAL

## 2023-10-11 DIAGNOSIS — M25.661 DECREASED RANGE OF MOTION (ROM) OF RIGHT KNEE: Primary | ICD-10-CM

## 2023-10-11 DIAGNOSIS — M62.81 QUADRICEPS WEAKNESS: ICD-10-CM

## 2023-10-11 PROCEDURE — 97530 THERAPEUTIC ACTIVITIES: CPT | Performed by: PHYSICAL THERAPIST

## 2023-10-11 PROCEDURE — 97140 MANUAL THERAPY 1/> REGIONS: CPT | Performed by: PHYSICAL THERAPIST

## 2023-10-11 NOTE — PROGRESS NOTES
[facility]   Physical Therapy Treatment Note      Name: Royal Martinez II  Clinic Number: [id number]    Therapy Diagnosis:   Encounter Diagnoses   Name Primary?    Decreased range of motion (ROM) of right knee Yes    Quadriceps weakness        Physician: ALEXANDER Emanuel MD    Visit Date: 10/11/2023  Physician Orders: PT Eval and Treat  Medical Diagnosis from Referral: Complex tear of lateral meniscus of right knee, unspecified whether old or current tear, initial encounter [S83.271A]  Evaluation Date: 6/16/2023  Authorization Period Expiration: 12/31/2023  Plan of Care Expiration: 12/31/2023  Visit # / Visits authorized:2/25      Time In: 1700  Time Out: 1800  Total Billable Time: 53 minutes    DOS: 6/13/2023   3 months on 9/13  6 months on 12/13    PROCEDURES PERFORMED:   Right knee arthroscopic combined inside-out and all inside lateral meniscus repair, complex (CPT 00253-56)  Right knee arthroscopic chondroplasty (patella, lateral femoral condyle, lateral tibial plateau) (CPT 61678)  Right knee arthroscopic limited notchplasty  Right knee arthroscopic plica excision      POSTOPERATIVE PLAN: May begin weight-bearing to tolerance.  Transitioned from the T scope brace to the lateral  brace which he will wear for 6 weeks.  May discontinue and wean off crutches with the assistance of his physical therapist.  Focus on functional strengthening and ROM.  No deep squats or high impact activity.  - New PT orders sent to [facility]. Handoff between therapists. Patient is moving August 19th.     Subjective     Pt reports: 10/11- He has continued with gym program. He followed up with Dr. Emanuel who cleared him to begin jogging but would recommend avoiding higher load activities. Squats are feeling better with working on ankle mobility.    He was compliant with home exercise program.  Response to previous treatment: n/a  Functional change: n/a    Pain: 0/10  Location: right knee  "    Objective      Pt presents with lateral knee  brace    Knee Passive Range of Motion:    Right  Left    Flexion 130 135   Extension 5 hyper 5      Strength  Hip abduction   L- 34.1  R- 28.2#  LSI- 82.7    Knee flexion  L- 61.8  R- 65.4  LSI- 106%    Knee extension  L- 120.2  R- 98.2  LSI- 81.7%    Anterior step down test  L- 44  R- 35    Treatment     Royal received the treatments listed below:      Royal received the following manual therapy techniques: Joint mobilizations and Soft tissue Mobilization were applied to the: lower quarter for 8 minutes, including:     Performed Today    Mobility assessment x Impaired ankle mobility  Impaired knee flexion range of motion  Impaired hip mobility   Joint mobilization X  X  x Ankle manipulation  Hip manipulation  Knee flexion mobs   Soft tissue mobilization     Dry needling       Plan for Next Visit: Continue as indicated       Royal received therapeutic exercises to develop strength, endurance, ROM, flexibility, posture, and core stabilization for 5 minutes including:       Performed Today    Strength / range of motion assessment     Bike  5 minutes   Incline treadmill x 5 minutes at 10*   Flexibility/mobility/ROM     Hip mobs  BTB 20x   Ankle mobs  BTB 20x        Strength            Plan for Next Visit: See above       Royal participated in neuromuscular re-education activities to improve: Balance, Coordination, Kinesthetic, Sense, Proprioception, and Posture for 00 minutes. The following activities were included:       Performed Today    Motor control/posture     Anterior step down  4" 3 x 15   MOBO RDL  3 x10        Balance/proprioception                 Plan for Next Visit: See above       Royal participated in dynamic functional therapeutic activities to improve functional performance for 40  minutes, including:    Step down test- results above     Performed Today    Jog/walk x 1 min jog, 3 min walk x 3 rounds  Discussion regarding jogging progression (1/3 " blocks, progressing to walking a mile)   Functional strength                    Power     Drop squat  3 x 10   Ankle pops  3 x 30s   Line hops X  X Anterior/posterior  Medial/lateral   Shuttle squat x 4.0 bands 3 x 20     Plan for Next Visit: See above       Patient Education and Home Exercises       Education provided:   - Emphasis on strength exercises at the gym  - continued care taken with loaded deep knee flexion    Written Home Exercises Provided: yes. Exercises were reviewed and Royal was able to demonstrate them prior to the end of the session.  Royal demonstrated good  understanding of the education provided. See EMR under Patient Instructions for exercises provided during therapy sessions    Assessment     10/11-  Royal Martinez II tolerated PT session well with no  complaints of pain or discomfort. Patient continues to have impaired LE strength and biomechanics. He tolerated progression into jogging today without increase in symptoms. Objective findings are improving with range of motion, joint mobility, functional strength, and functional activity performance.  We discussed potential of progressing into running and light plyos in the coming weeks. We will hold until follow up with Dr. Emanuel. Updated home exercises were issued during today's visit. Royal demonstrated good understanding of new exercises and will continue to progress at home until next follow-up.         Royal Is progressing well towards his goals.   Pt prognosis is Excellent.     Pt will continue to benefit from skilled outpatient physical therapy to address the deficits listed in the problem list box on initial evaluation, provide pt/family education and to maximize pt's level of independence in the home and community environment.     Pt's spiritual, cultural and educational needs considered and pt agreeable to plan of care and goals.     Anticipated barriers to physical therapy: none  Goals:  Short Term Goals: 8-10 weeks  1. Pt will be  compliant with HEP 50% of prescribed amount. MET  2. The pt to demo improvement in R knee ROM to equal L knee ROM pain free   3.  The pt to demo good quad set with proper hyperextension moment of the R knee MET  4. The pt to demo ambualting with elast restricitve AD witout major compensatory pattern R knee for at least 20 feet      Long Term Goals: 24-36 weeks   Pt will be compliant with % of prescribed amount.   The pt to demo pain free and uncompensated running mechanics x10 min on an indoor treadmill  The pt to demo tolerance to a squat at or bellow parallel with uncompensated mechanics x10   The pt to demo strength of R LE within 10% of L LE as demo'd on the biodex machine   The pt to demo a deficit of 10% or less on a triple hop, single leg broad jump and crossover hop compared to non operative LE.   The pt will report full participation in ADLs and IADLs without restrictions related to R knee.         Plan   Plan of care Certification: 6/16/2023 to 12/31/2023.     Outpatient Physical Therapy 2-3 times weekly for 36 weeks to include the following interventions: Electrical Stimulation  , Gait Training, Manual Therapy, Moist Heat/ Ice, Neuromuscular Re-ed, Patient Education, Therapeutic Activities, and Therapeutic Exercise.     Lionel Anand, PT, DPT

## 2023-10-13 ENCOUNTER — CLINICAL SUPPORT (OUTPATIENT)
Dept: REHABILITATION | Facility: HOSPITAL | Age: 23
End: 2023-10-13
Payer: COMMERCIAL

## 2023-10-13 DIAGNOSIS — M25.661 DECREASED RANGE OF MOTION (ROM) OF RIGHT KNEE: Primary | ICD-10-CM

## 2023-10-13 DIAGNOSIS — M62.81 QUADRICEPS WEAKNESS: ICD-10-CM

## 2023-10-13 PROCEDURE — 97140 MANUAL THERAPY 1/> REGIONS: CPT | Performed by: PHYSICAL THERAPIST

## 2023-10-13 PROCEDURE — 97530 THERAPEUTIC ACTIVITIES: CPT | Performed by: PHYSICAL THERAPIST

## 2023-10-13 PROCEDURE — 97110 THERAPEUTIC EXERCISES: CPT | Performed by: PHYSICAL THERAPIST

## 2023-10-13 PROCEDURE — 97112 NEUROMUSCULAR REEDUCATION: CPT | Performed by: PHYSICAL THERAPIST

## 2023-10-13 NOTE — PROGRESS NOTES
[facility]   Physical Therapy Treatment Note      Name: Royal Martinez II  Clinic Number: [id number]    Therapy Diagnosis:   Encounter Diagnoses   Name Primary?    Decreased range of motion (ROM) of right knee Yes    Quadriceps weakness        Physician: ALEXANDER Emanuel MD    Visit Date: 10/13/2023  Physician Orders: PT Eval and Treat  Medical Diagnosis from Referral: Complex tear of lateral meniscus of right knee, unspecified whether old or current tear, initial encounter [S83.271A]  Evaluation Date: 6/16/2023  Authorization Period Expiration: 12/31/2023  Plan of Care Expiration: 12/31/2023  Visit # / Visits authorized:3/25      Time In: 1700  Time Out: 1800  Total Billable Time: 51 minutes    DOS: 6/13/2023   3 months on 9/13  6 months on 12/13    PROCEDURES PERFORMED:   Right knee arthroscopic combined inside-out and all inside lateral meniscus repair, complex (CPT 67836-14)  Right knee arthroscopic chondroplasty (patella, lateral femoral condyle, lateral tibial plateau) (CPT 35374)  Right knee arthroscopic limited notchplasty  Right knee arthroscopic plica excision      POSTOPERATIVE PLAN: May begin weight-bearing to tolerance.  Transitioned from the T scope brace to the lateral  brace which he will wear for 6 weeks.  May discontinue and wean off crutches with the assistance of his physical therapist.  Focus on functional strengthening and ROM.  No deep squats or high impact activity.  - New PT orders sent to [facility]. Handoff between therapists. Patient is moving August 19th.     Subjective     Pt reports: 10/13- No increase in symptoms after jogging last visit.    He was compliant with home exercise program.  Response to previous treatment: n/a  Functional change: n/a    Pain: 0/10  Location: right knee     Objective      Pt presents with lateral knee  brace    Knee Passive Range of Motion:    Right  Left    Flexion 130 135   Extension 5 hyper 5     "  Strength  Hip abduction   L- 34.1  R- 28.2#  LSI- 82.7    Knee flexion  L- 61.8  R- 65.4  LSI- 106%    Knee extension  L- 120.2  R- 98.2  LSI- 81.7%    Anterior step down test  L- 44  R- 35    Treatment     Royal received the treatments listed below:      Royal received the following manual therapy techniques: Joint mobilizations and Soft tissue Mobilization were applied to the: lower quarter for 8 minutes, including:     Performed Today    Mobility assessment x Impaired ankle mobility  Impaired knee flexion range of motion  Impaired hip mobility   Joint mobilization X  X  x Ankle manipulation  Hip manipulation  Knee flexion mobs   Soft tissue mobilization     Dry needling       Plan for Next Visit: Continue as indicated       Royal received therapeutic exercises to develop strength, endurance, ROM, flexibility, posture, and core stabilization for 10 minutes including:       Performed Today    Strength / range of motion assessment     Bike  5 minutes   Incline treadmill  5 minutes at 10*   Flexibility/mobility/ROM     Hip mobs  BTB 20x   Ankle mobs  BTB 20x        Strength     Sgl heel raise x    Knee extension machine x 4 x 8 75#   Hamstring curl machine x 3 x 10     Plan for Next Visit: See above       Royal participated in neuromuscular re-education activities to improve: Balance, Coordination, Kinesthetic, Sense, Proprioception, and Posture for 08 minutes. The following activities were included:       Performed Today    Motor control/posture     Anterior step down x Decline 6" 3 x 15   MOBO RDL  3 x10   Side plank abduction x    Balance/proprioception                 Plan for Next Visit: See above       Royal participated in dynamic functional therapeutic activities to improve functional performance for 25  minutes, including:       Performed Today    Jog/walk x 1 min jog, 3 min walk x 3 rounds  Discussion regarding jogging progression (1/3 blocks, progressing to walking a mile)   Functional strength     Split " squat x 2 x 35# DB   Split RDL x 2 x 35# DB        Power     Drop squat  3 x 10   Ankle pops  3 x 30s   Line hops  Anterior/posterior  Medial/lateral   Shuttle squat  4.0 bands 3 x 20     Plan for Next Visit: See above       Patient Education and Home Exercises       Education provided:   - Emphasis on strength exercises at the gym  - continued care taken with loaded deep knee flexion    Written Home Exercises Provided: yes. Exercises were reviewed and Royal was able to demonstrate them prior to the end of the session.  Royal demonstrated good  understanding of the education provided. See EMR under Patient Instructions for exercises provided during therapy sessions    Assessment     10/13- Royal Martinez II tolerated PT session well with no  complaints of pain or discomfort. Patient continues to have impaired LE strength and biomechanics. Objective findings are improving with range of motion, joint mobility, functional strength, and functional activity performance.  Updated home exercises were issued during today's visit. Royal demonstrated good understanding of new exercises and will continue to progress at home until next follow-up.         Royal Is progressing well towards his goals.   Pt prognosis is Excellent.     Pt will continue to benefit from skilled outpatient physical therapy to address the deficits listed in the problem list box on initial evaluation, provide pt/family education and to maximize pt's level of independence in the home and community environment.     Pt's spiritual, cultural and educational needs considered and pt agreeable to plan of care and goals.     Anticipated barriers to physical therapy: none  Goals:  Short Term Goals: 8-10 weeks  1. Pt will be compliant with HEP 50% of prescribed amount. MET  2. The pt to demo improvement in R knee ROM to equal L knee ROM pain free   3.  The pt to demo good quad set with proper hyperextension moment of the R knee MET  4. The pt to demo ambualting with  elast restricitve AD witout major compensatory pattern R knee for at least 20 feet      Long Term Goals: 24-36 weeks   Pt will be compliant with % of prescribed amount.   The pt to demo pain free and uncompensated running mechanics x10 min on an indoor treadmill  The pt to demo tolerance to a squat at or bellow parallel with uncompensated mechanics x10   The pt to demo strength of R LE within 10% of L LE as demo'd on the biodex machine   The pt to demo a deficit of 10% or less on a triple hop, single leg broad jump and crossover hop compared to non operative LE.   The pt will report full participation in ADLs and IADLs without restrictions related to R knee.         Plan   Plan of care Certification: 6/16/2023 to 12/31/2023.     Outpatient Physical Therapy 2-3 times weekly for 36 weeks to include the following interventions: Electrical Stimulation  , Gait Training, Manual Therapy, Moist Heat/ Ice, Neuromuscular Re-ed, Patient Education, Therapeutic Activities, and Therapeutic Exercise.     Lionel Anand, PT, DPT

## 2023-10-16 ENCOUNTER — CLINICAL SUPPORT (OUTPATIENT)
Dept: REHABILITATION | Facility: HOSPITAL | Age: 23
End: 2023-10-16
Payer: COMMERCIAL

## 2023-10-16 DIAGNOSIS — M62.81 QUADRICEPS WEAKNESS: ICD-10-CM

## 2023-10-16 DIAGNOSIS — M25.661 DECREASED RANGE OF MOTION (ROM) OF RIGHT KNEE: Primary | ICD-10-CM

## 2023-10-16 PROCEDURE — 97530 THERAPEUTIC ACTIVITIES: CPT | Performed by: PHYSICAL THERAPIST

## 2023-10-16 PROCEDURE — 97140 MANUAL THERAPY 1/> REGIONS: CPT | Performed by: PHYSICAL THERAPIST

## 2023-10-18 ENCOUNTER — CLINICAL SUPPORT (OUTPATIENT)
Dept: REHABILITATION | Facility: HOSPITAL | Age: 23
End: 2023-10-18
Payer: COMMERCIAL

## 2023-10-18 DIAGNOSIS — M25.661 DECREASED RANGE OF MOTION (ROM) OF RIGHT KNEE: Primary | ICD-10-CM

## 2023-10-18 DIAGNOSIS — M62.81 QUADRICEPS WEAKNESS: ICD-10-CM

## 2023-10-18 PROCEDURE — 97140 MANUAL THERAPY 1/> REGIONS: CPT | Performed by: PHYSICAL THERAPIST

## 2023-10-18 PROCEDURE — 97530 THERAPEUTIC ACTIVITIES: CPT | Performed by: PHYSICAL THERAPIST

## 2023-10-18 PROCEDURE — 97110 THERAPEUTIC EXERCISES: CPT | Performed by: PHYSICAL THERAPIST

## 2023-10-18 NOTE — PROGRESS NOTES
[facility]   Physical Therapy Treatment Note      Name: Royal Martinez II  Clinic Number: [id number]    Therapy Diagnosis:   Encounter Diagnoses   Name Primary?    Decreased range of motion (ROM) of right knee Yes    Quadriceps weakness        Physician: ALEXANDER Emanuel MD    Visit Date: 10/16/2023  Physician Orders: PT Eval and Treat  Medical Diagnosis from Referral: Complex tear of lateral meniscus of right knee, unspecified whether old or current tear, initial encounter [S83.271A]  Evaluation Date: 6/16/2023  Authorization Period Expiration: 12/31/2023  Plan of Care Expiration: 12/31/2023  Visit # / Visits authorized:4/25      Time In: 1700  Time Out: 1800  Total Billable Time: 58 minutes    DOS: 6/13/2023   3 months on 9/13  6 months on 12/13    PROCEDURES PERFORMED:   Right knee arthroscopic combined inside-out and all inside lateral meniscus repair, complex (CPT 00065-29)  Right knee arthroscopic chondroplasty (patella, lateral femoral condyle, lateral tibial plateau) (CPT 19459)  Right knee arthroscopic limited notchplasty  Right knee arthroscopic plica excision      POSTOPERATIVE PLAN: May begin weight-bearing to tolerance.  Transitioned from the T scope brace to the lateral  brace which he will wear for 6 weeks.  May discontinue and wean off crutches with the assistance of his physical therapist.  Focus on functional strengthening and ROM.  No deep squats or high impact activity.  - New PT orders sent to [facility]. Handoff between therapists. Patient is moving August 19th.     Subjective     Pt reports: 10/16- He had a little bit of soreness and swelling after the last time he jogged.    He was compliant with home exercise program.  Response to previous treatment: n/a  Functional change: n/a    Pain: 0/10  Location: right knee     Objective      Pt presents with lateral knee  brace    Knee Passive Range of Motion:    Right  Left    Flexion 130 135  "  Extension 5 hyper 5      Strength  Hip abduction   L- 34.1  R- 28.2#  LSI- 82.7    Knee flexion  L- 61.8  R- 65.4  LSI- 106%    Knee extension  L- 120.2  R- 98.2  LSI- 81.7%    Anterior step down test  L- 44  R- 35    Treatment     Royal received the treatments listed below:      Royal received the following manual therapy techniques: Joint mobilizations and Soft tissue Mobilization were applied to the: lower quarter for 8 minutes, including:     Performed Today    Mobility assessment x Impaired ankle mobility  Impaired knee flexion range of motion  Impaired hip mobility   Joint mobilization X  X  x Ankle manipulation  Hip manipulation  Knee flexion mobs   Soft tissue mobilization     Dry needling       Plan for Next Visit: Continue as indicated       Royal received therapeutic exercises to develop strength, endurance, ROM, flexibility, posture, and core stabilization for 5 minutes including:       Performed Today    Strength / range of motion assessment     Bike x 5 minutes   Incline treadmill  5 minutes at 10*   Flexibility/mobility/ROM     Hip mobs  BTB 20x   Ankle mobs  BTB 20x        Strength     Sgl heel raise     Knee extension machine  4 x 8 75#   Hamstring curl machine  3 x 10     Plan for Next Visit: See above       Royal participated in neuromuscular re-education activities to improve: Balance, Coordination, Kinesthetic, Sense, Proprioception, and Posture for 00 minutes. The following activities were included:       Performed Today    Motor control/posture     Anterior step down  Decline 6" 3 x 15   MOBO RDL  3 x10   Side plank abduction     Balance/proprioception                 Plan for Next Visit: See above       Royal participated in dynamic functional therapeutic activities to improve functional performance for 45  minutes, including:       Performed Today    Jog/walk x 1 min jog, 3 min walk x 3 rounds  Discussion regarding jogging progression (1/3 blocks, progressing to walking a mile) " "  Functional strength     Split squat x 2 x 35# DB   Split RDL x 2 x 35# DB        Power     Box jumps x 16" 4 x 6   Cable lunge decel x 15# 3 x 10   Drop land x 6" 3 x 10   Agility x Ladder drills x 6 min  Lateral shuffle  [place]     Plan for Next Visit: See above       Patient Education and Home Exercises       Education provided:   - Emphasis on strength exercises at the gym  - continued care taken with loaded deep knee flexion    Written Home Exercises Provided: yes. Exercises were reviewed and Royal was able to demonstrate them prior to the end of the session.  Royal demonstrated good  understanding of the education provided. See EMR under Patient Instructions for exercises provided during therapy sessions    Assessment     10/16-  10/13- Royal Martinez II tolerated PT session well with no  complaints of pain or discomfort. Patient continues to have impaired LE strength and biomechanics. Objective findings are improving with range of motion, joint mobility, functional strength, and functional activity performance.  Updated home exercises were issued during today's visit. Royal demonstrated good understanding of new exercises and will continue to progress at home until next follow-up.         Royal Is progressing well towards his goals.   Pt prognosis is Excellent.     Pt will continue to benefit from skilled outpatient physical therapy to address the deficits listed in the problem list box on initial evaluation, provide pt/family education and to maximize pt's level of independence in the home and community environment.     Pt's spiritual, cultural and educational needs considered and pt agreeable to plan of care and goals.     Anticipated barriers to physical therapy: none  Goals:  Short Term Goals: 8-10 weeks  1. Pt will be compliant with HEP 50% of prescribed amount. MET  2. The pt to demo improvement in R knee ROM to equal L knee ROM pain free   3.  The pt to demo good quad set with proper hyperextension " moment of the R knee MET  4. The pt to demo ambualting with elast restricitve AD witout major compensatory pattern R knee for at least 20 feet      Long Term Goals: 24-36 weeks   Pt will be compliant with % of prescribed amount.   The pt to demo pain free and uncompensated running mechanics x10 min on an indoor treadmill  The pt to demo tolerance to a squat at or bellow parallel with uncompensated mechanics x10   The pt to demo strength of R LE within 10% of L LE as demo'd on the biodex machine   The pt to demo a deficit of 10% or less on a triple hop, single leg broad jump and crossover hop compared to non operative LE.   The pt will report full participation in ADLs and IADLs without restrictions related to R knee.         Plan   Plan of care Certification: 6/16/2023 to 12/31/2023.     Outpatient Physical Therapy 2-3 times weekly for 36 weeks to include the following interventions: Electrical Stimulation  , Gait Training, Manual Therapy, Moist Heat/ Ice, Neuromuscular Re-ed, Patient Education, Therapeutic Activities, and Therapeutic Exercise.     Lionel Anand, PT, DPT

## 2023-10-18 NOTE — PROGRESS NOTES
[facility]   Physical Therapy Treatment Note      Name: Royal Martinez II  Clinic Number: [id number]    Therapy Diagnosis:   Encounter Diagnoses   Name Primary?    Decreased range of motion (ROM) of right knee Yes    Quadriceps weakness        Physician: ALEXANDER Emanuel MD    Visit Date: 10/18/2023  Physician Orders: PT Eval and Treat  Medical Diagnosis from Referral: Complex tear of lateral meniscus of right knee, unspecified whether old or current tear, initial encounter [S83.271A]  Evaluation Date: 6/16/2023  Authorization Period Expiration: 12/31/2023  Plan of Care Expiration: 12/31/2023  Visit # / Visits authorized:5/25      Time In: 1700  Time Out: 1800  Total Billable Time: 53 minutes    DOS: 6/13/2023   3 months on 9/13  6 months on 12/13    PROCEDURES PERFORMED:   Right knee arthroscopic combined inside-out and all inside lateral meniscus repair, complex (CPT 52003-28)  Right knee arthroscopic chondroplasty (patella, lateral femoral condyle, lateral tibial plateau) (CPT 03118)  Right knee arthroscopic limited notchplasty  Right knee arthroscopic plica excision      POSTOPERATIVE PLAN: May begin weight-bearing to tolerance.  Transitioned from the T scope brace to the lateral  brace which he will wear for 6 weeks.  May discontinue and wean off crutches with the assistance of his physical therapist.  Focus on functional strengthening and ROM.  No deep squats or high impact activity.  - New PT orders sent to [facility]. Handoff between therapists. Patient is moving August 19th.     Subjective     Pt reports: 10/18- No issues after last visit. He did legs at the gym yesterday.    He was compliant with home exercise program.  Response to previous treatment: n/a  Functional change: n/a    Pain: 0/10  Location: right knee     Objective      Pt presents with lateral knee  brace    Knee Passive Range of Motion:    Right  Left    Flexion 130 135   Extension 5 hyper 5  "     Strength  Hip abduction   L- 34.1  R- 28.2#  LSI- 82.7    Knee flexion  L- 61.8  R- 65.4  LSI- 106%    Knee extension  L- 120.2  R- 98.2  LSI- 81.7%    Anterior step down test  L- 44  R- 35    Treatment     Royal received the treatments listed below:      Royal received the following manual therapy techniques: Joint mobilizations and Soft tissue Mobilization were applied to the: lower quarter for 8 minutes, including:     Performed Today    Mobility assessment x Impaired ankle mobility  Impaired knee flexion range of motion  Impaired hip mobility   Joint mobilization X  X  x Ankle manipulation  Hip manipulation  Knee flexion mobs   Soft tissue mobilization     Dry needling       Plan for Next Visit: Continue as indicated       Royal received therapeutic exercises to develop strength, endurance, ROM, flexibility, posture, and core stabilization for 15 minutes including:    Discussion regarding continued progression of strength program at the gym for 5 minutes     Performed Today    Strength / range of motion assessment     Bike x 5 minutes   Incline treadmill  5 minutes at 10*   Flexibility/mobility/ROM     Hip mobs x BTB 20x   Ankle mobs x BTB 20x        Strength     Sgl heel raise     Knee extension machine  4 x 8 75#   Hamstring curl machine  3 x 10     Plan for Next Visit: See above       Royal participated in neuromuscular re-education activities to improve: Balance, Coordination, Kinesthetic, Sense, Proprioception, and Posture for 00 minutes. The following activities were included:       Performed Today    Motor control/posture     Anterior step down  Decline 6" 3 x 15   MOBO RDL  3 x10   Side plank abduction     Balance/proprioception                 Plan for Next Visit: See above       Royal participated in dynamic functional therapeutic activities to improve functional performance for 30  minutes, including:       Performed Today    Jog/walk  1 min jog, 3 min walk x 3 rounds  Discussion regarding " "jogging progression (1/3 blocks, progressing to walking a mile)   Functional strength     Split squat x 2 x 35# DB   Split RDL x 2 x 35# DB        Power     Box jumps x 16" 4 x 6   Cable lunge decel  15# 3 x 10   Drop land x 16" 3 x 10   Ice skaters x 3 laps   Agility x Ladder drills x 6 min  Lateral shuffle  [place]     Plan for Next Visit: See above       Patient Education and Home Exercises       Education provided:   - Emphasis on strength exercises at the gym  - continued care taken with loaded deep knee flexion    Written Home Exercises Provided: yes. Exercises were reviewed and Royal was able to demonstrate them prior to the end of the session.  Royal demonstrated good  understanding of the education provided. See EMR under Patient Instructions for exercises provided during therapy sessions    Assessment     10/18- Royal Martinez II tolerated PT session well with no  complaints of pain or discomfort. Patient continues to have impaired LE strength and biomechanics. Improving hip and ankle mobility. Good mechanics noted with all athletic movements today. Objective findings are improving with range of motion, joint mobility, functional strength, and functional activity performance.  Updated home exercises were issued during today's visit. Royal demonstrated good understanding of new exercises and will continue to progress at home until next follow-up.         Royal Is progressing well towards his goals.   Pt prognosis is Excellent.     Pt will continue to benefit from skilled outpatient physical therapy to address the deficits listed in the problem list box on initial evaluation, provide pt/family education and to maximize pt's level of independence in the home and community environment.     Pt's spiritual, cultural and educational needs considered and pt agreeable to plan of care and goals.     Anticipated barriers to physical therapy: none  Goals:  Short Term Goals: 8-10 weeks  1. Pt will be compliant with HEP " 50% of prescribed amount. MET  2. The pt to demo improvement in R knee ROM to equal L knee ROM pain free   3.  The pt to demo good quad set with proper hyperextension moment of the R knee MET  4. The pt to demo ambualting with elast restricitve AD witout major compensatory pattern R knee for at least 20 feet      Long Term Goals: 24-36 weeks   Pt will be compliant with % of prescribed amount.   The pt to demo pain free and uncompensated running mechanics x10 min on an indoor treadmill  The pt to demo tolerance to a squat at or bellow parallel with uncompensated mechanics x10   The pt to demo strength of R LE within 10% of L LE as demo'd on the biodex machine   The pt to demo a deficit of 10% or less on a triple hop, single leg broad jump and crossover hop compared to non operative LE.   The pt will report full participation in ADLs and IADLs without restrictions related to R knee.         Plan   Plan of care Certification: 6/16/2023 to 12/31/2023.     Outpatient Physical Therapy 2-3 times weekly for 36 weeks to include the following interventions: Electrical Stimulation  , Gait Training, Manual Therapy, Moist Heat/ Ice, Neuromuscular Re-ed, Patient Education, Therapeutic Activities, and Therapeutic Exercise.     Lionel Anand, PT, DPT

## 2023-10-21 ENCOUNTER — IMMUNIZATION (OUTPATIENT)
Dept: INTERNAL MEDICINE | Facility: CLINIC | Age: 23
End: 2023-10-21
Payer: COMMERCIAL

## 2023-10-21 PROCEDURE — 90471 IMMUNIZATION ADMIN: CPT | Mod: S$GLB,,, | Performed by: FAMILY MEDICINE

## 2023-10-21 PROCEDURE — 90686 FLU VACCINE (QUAD) GREATER THAN OR EQUAL TO 3YO PRESERVATIVE FREE IM: ICD-10-PCS | Mod: S$GLB,,, | Performed by: FAMILY MEDICINE

## 2023-10-21 PROCEDURE — 90686 IIV4 VACC NO PRSV 0.5 ML IM: CPT | Mod: S$GLB,,, | Performed by: FAMILY MEDICINE

## 2023-10-21 PROCEDURE — 90471 FLU VACCINE (QUAD) GREATER THAN OR EQUAL TO 3YO PRESERVATIVE FREE IM: ICD-10-PCS | Mod: S$GLB,,, | Performed by: FAMILY MEDICINE

## 2023-10-23 ENCOUNTER — CLINICAL SUPPORT (OUTPATIENT)
Dept: REHABILITATION | Facility: HOSPITAL | Age: 23
End: 2023-10-23
Payer: COMMERCIAL

## 2023-10-23 DIAGNOSIS — M62.81 QUADRICEPS WEAKNESS: ICD-10-CM

## 2023-10-23 DIAGNOSIS — M25.661 DECREASED RANGE OF MOTION (ROM) OF RIGHT KNEE: Primary | ICD-10-CM

## 2023-10-23 PROCEDURE — 97140 MANUAL THERAPY 1/> REGIONS: CPT | Performed by: PHYSICAL THERAPIST

## 2023-10-23 PROCEDURE — 97530 THERAPEUTIC ACTIVITIES: CPT | Performed by: PHYSICAL THERAPIST

## 2023-10-23 PROCEDURE — 97110 THERAPEUTIC EXERCISES: CPT | Performed by: PHYSICAL THERAPIST

## 2023-10-23 NOTE — PROGRESS NOTES
[facility]   Physical Therapy Treatment Note      Name: Royal Martinez II  Clinic Number: [id number]    Therapy Diagnosis:   Encounter Diagnoses   Name Primary?    Decreased range of motion (ROM) of right knee Yes    Quadriceps weakness        Physician: ALEXANDER Emanuel MD    Visit Date: 10/23/2023  Physician Orders: PT Eval and Treat  Medical Diagnosis from Referral: Complex tear of lateral meniscus of right knee, unspecified whether old or current tear, initial encounter [S83.271A]  Evaluation Date: 6/16/2023  Authorization Period Expiration: 12/31/2023  Plan of Care Expiration: 12/31/2023  Visit # / Visits authorized:6/25      Time In: 1600  Time Out: 1650  Total Billable Time: 45 minutes    DOS: 6/13/2023   3 months on 9/13  6 months on 12/13    PROCEDURES PERFORMED:   Right knee arthroscopic combined inside-out and all inside lateral meniscus repair, complex (CPT 49521-76)  Right knee arthroscopic chondroplasty (patella, lateral femoral condyle, lateral tibial plateau) (CPT 54205)  Right knee arthroscopic limited notchplasty  Right knee arthroscopic plica excision      POSTOPERATIVE PLAN: May begin weight-bearing to tolerance.  Transitioned from the T scope brace to the lateral  brace which he will wear for 6 weeks.  May discontinue and wean off crutches with the assistance of his physical therapist.  Focus on functional strengthening and ROM.  No deep squats or high impact activity.  - New PT orders sent to [facility]. Handoff between therapists. Patient is moving August 19th.     Subjective     Pt reports: 10/23- He has continued to work on jogging and strength on his own.    He was compliant with home exercise program.  Response to previous treatment: n/a  Functional change: n/a    Pain: 0/10  Location: right knee     Objective      Pt presents with lateral knee  brace    Knee Passive Range of Motion:    Right  Left    Flexion 130 135   Extension 5 hyper 5     "  Strength  Hip abduction   L- 34.1  R- 28.2#  LSI- 82.7    Knee flexion  L- 61.8  R- 65.4  LSI- 106%    Knee extension  L- 120.2  R- 98.2  LSI- 81.7%    Anterior step down test  L- 44  R- 35    Treatment     Royal received the treatments listed below:      Royal received the following manual therapy techniques: Joint mobilizations and Soft tissue Mobilization were applied to the: lower quarter for 10 minutes, including:     Performed Today    Mobility assessment x Impaired ankle mobility  Impaired knee flexion range of motion  Impaired hip mobility   Joint mobilization X  X  x Ankle manipulation  Hip manipulation  Knee flexion mobs   Soft tissue mobilization     Dry needling       Plan for Next Visit: Continue as indicated       Royal received therapeutic exercises to develop strength, endurance, ROM, flexibility, posture, and core stabilization for 10 minutes including:       Performed Today    Strength / range of motion assessment     Bike  5 minutes   Incline treadmill x 5 minutes at 10*   Dynamic warm up x Jog, A skips, lateral shuffle, carioca, bunny hops   Flexibility/mobility/ROM     Hip mobs  BTB 20x   Ankle mobs  BTB 20x        Strength     Sgl heel raise     Knee extension machine  4 x 8 75#   Hamstring curl machine  3 x 10     Plan for Next Visit: See above       Royal participated in neuromuscular re-education activities to improve: Balance, Coordination, Kinesthetic, Sense, Proprioception, and Posture for 00 minutes. The following activities were included:       Performed Today    Motor control/posture     Anterior step down  Decline 6" 3 x 15   MOBO RDL  3 x10   Side plank abduction     Balance/proprioception                 Plan for Next Visit: See above       Royal participated in dynamic functional therapeutic activities to improve functional performance for 25  minutes, including:       Performed Today    Jog/walk  1 min jog, 3 min walk x 3 rounds  Discussion regarding jogging progression (1/3 " "blocks, progressing to walking a mile)   Functional strength     Split squat  2 x 35# DB   Split RDL  2 x 35# DB        Power     Box jumps x 20" 4 x 6   KB swings x 45# 3 x 20   Cable lunge decel  15# 3 x 10   Drop land  16" 3 x 10   Lunge jump with TRX x 3 x 10   Ice skaters x 3 laps   Agility x Ladder drills x 6 min  Lateral shuffle  [place]     Plan for Next Visit: See above       Patient Education and Home Exercises       Education provided:   - Emphasis on strength exercises at the gym  - continued care taken with loaded deep knee flexion    Written Home Exercises Provided: yes. Exercises were reviewed and Royal was able to demonstrate them prior to the end of the session.  Royal demonstrated good  understanding of the education provided. See EMR under Patient Instructions for exercises provided during therapy sessions    Assessment     10/23- Royal Martinez II tolerated PT session well with no  complaints of pain or discomfort. Patient continues to have impaired LE strength and biomechanics. Improving hip and ankle mobility. Good mechanics noted with all athletic movements today. Objective findings are improving with range of motion, joint mobility, functional strength, and functional activity performance.  Updated home exercises were issued during today's visit. Royal demonstrated good understanding of new exercises and will continue to progress at home until next follow-up.         Royal Is progressing well towards his goals.   Pt prognosis is Excellent.     Pt will continue to benefit from skilled outpatient physical therapy to address the deficits listed in the problem list box on initial evaluation, provide pt/family education and to maximize pt's level of independence in the home and community environment.     Pt's spiritual, cultural and educational needs considered and pt agreeable to plan of care and goals.     Anticipated barriers to physical therapy: none  Goals:  Short Term Goals: 8-10 weeks  1. Pt " will be compliant with HEP 50% of prescribed amount. MET  2. The pt to demo improvement in R knee ROM to equal L knee ROM pain free   3.  The pt to demo good quad set with proper hyperextension moment of the R knee MET  4. The pt to demo ambualting with elast restricitve AD witout major compensatory pattern R knee for at least 20 feet      Long Term Goals: 24-36 weeks   Pt will be compliant with % of prescribed amount.   The pt to demo pain free and uncompensated running mechanics x10 min on an indoor treadmill  The pt to demo tolerance to a squat at or bellow parallel with uncompensated mechanics x10   The pt to demo strength of R LE within 10% of L LE as demo'd on the biodex machine   The pt to demo a deficit of 10% or less on a triple hop, single leg broad jump and crossover hop compared to non operative LE.   The pt will report full participation in ADLs and IADLs without restrictions related to R knee.         Plan   Plan of care Certification: 6/16/2023 to 12/31/2023.     Outpatient Physical Therapy 2-3 times weekly for 36 weeks to include the following interventions: Electrical Stimulation  , Gait Training, Manual Therapy, Moist Heat/ Ice, Neuromuscular Re-ed, Patient Education, Therapeutic Activities, and Therapeutic Exercise.     Lionel Anand, PT, DPT

## 2023-10-30 ENCOUNTER — CLINICAL SUPPORT (OUTPATIENT)
Dept: REHABILITATION | Facility: HOSPITAL | Age: 23
End: 2023-10-30
Payer: COMMERCIAL

## 2023-10-30 DIAGNOSIS — M62.81 QUADRICEPS WEAKNESS: ICD-10-CM

## 2023-10-30 DIAGNOSIS — M25.661 DECREASED RANGE OF MOTION (ROM) OF RIGHT KNEE: Primary | ICD-10-CM

## 2023-10-30 PROCEDURE — 97140 MANUAL THERAPY 1/> REGIONS: CPT | Performed by: PHYSICAL THERAPIST

## 2023-10-30 PROCEDURE — 97110 THERAPEUTIC EXERCISES: CPT | Performed by: PHYSICAL THERAPIST

## 2023-10-30 PROCEDURE — 97530 THERAPEUTIC ACTIVITIES: CPT | Performed by: PHYSICAL THERAPIST

## 2023-10-31 NOTE — PROGRESS NOTES
[facility]   Physical Therapy Treatment Note      Name: Royal Martinez II  Clinic Number: [id number]    Therapy Diagnosis:   Encounter Diagnoses   Name Primary?    Decreased range of motion (ROM) of right knee Yes    Quadriceps weakness        Physician: ALEXANDER Emanuel MD    Visit Date: 10/30/2023  Physician Orders: PT Eval and Treat  Medical Diagnosis from Referral: Complex tear of lateral meniscus of right knee, unspecified whether old or current tear, initial encounter [S83.271A]  Evaluation Date: 6/16/2023  Authorization Period Expiration: 12/31/2023  Plan of Care Expiration: 12/31/2023  Visit # / Visits authorized: 7/25      Time In: 1600  Time Out: 1655  Total Billable Time: 50 minutes    DOS: 6/13/2023   3 months on 9/13  6 months on 12/13    PROCEDURES PERFORMED:   Right knee arthroscopic combined inside-out and all inside lateral meniscus repair, complex (CPT 18943-24)  Right knee arthroscopic chondroplasty (patella, lateral femoral condyle, lateral tibial plateau) (CPT 96653)  Right knee arthroscopic limited notchplasty  Right knee arthroscopic plica excision      POSTOPERATIVE PLAN: May begin weight-bearing to tolerance.  Transitioned from the T scope brace to the lateral  brace which he will wear for 6 weeks.  May discontinue and wean off crutches with the assistance of his physical therapist.  Focus on functional strengthening and ROM.  No deep squats or high impact activity.  - New PT orders sent to [facility]. Handoff between therapists. Patient is moving August 19th.     Subjective     Pt reports: 10/30 Strength training is going well, he hasn't been having any reactive effusion with any exercises.    He was compliant with home exercise program.  Response to previous treatment: n/a  Functional change: n/a    Pain: 0/10  Location: right knee     Objective      Pt presents with lateral knee  brace    Knee Passive Range of Motion:    Right  Left   "  Flexion 130 135   Extension 5 hyper 5      Strength  Hip abduction   L- 34.1  R- 28.2#  LSI- 82.7    Knee flexion  L- 61.8  R- 65.4  LSI- 106%    Knee extension  L- 120.2  R- 98.2  LSI- 81.7%    Anterior step down test  L- 44  R- 35    Treatment     Royal received the treatments listed below:      Royal received the following manual therapy techniques: Joint mobilizations and Soft tissue Mobilization were applied to the: lower quarter for 10 minutes, including:     Performed Today    Mobility assessment x Impaired ankle mobility  Impaired knee flexion range of motion  Impaired hip mobility   Joint mobilization X  X  x Ankle manipulation  Hip manipulation  Knee flexion mobs   Soft tissue mobilization     Dry needling       Plan for Next Visit: Continue as indicated       Royal received therapeutic exercises to develop strength, endurance, ROM, flexibility, posture, and core stabilization for 10 minutes including:       Performed Today    Strength / range of motion assessment     Bike  5 minutes   Incline treadmill x 5 minutes at 10*   Dynamic warm up x Jog, A skips, lateral shuffle, carioca, bunny hops   Flexibility/mobility/ROM     Hip mobs  BTB 20x   Ankle mobs  BTB 20x        Strength     Sgl heel raise     Knee extension machine  4 x 8 75#   Hamstring curl machine  3 x 10     Plan for Next Visit: See above       Royal participated in neuromuscular re-education activities to improve: Balance, Coordination, Kinesthetic, Sense, Proprioception, and Posture for 00 minutes. The following activities were included:       Performed Today    Motor control/posture     Anterior step down  Decline 6" 3 x 15   MOBO RDL  3 x10   Side plank abduction     Balance/proprioception                 Plan for Next Visit: See above       Royal participated in dynamic functional therapeutic activities to improve functional performance for 30  minutes, including:       Performed Today    Jog/walk  1 min jog, 3 min walk x 3 " "rounds  Discussion regarding jogging progression (1/3 blocks, progressing to walking a mile)   Functional strength     Split squat  2 x 35# DB   Split RDL  2 x 35# DB        Power     Box jumps x 20" 4 x 6   KB swings  45# 3 x 20   Cable lunge decel  15# 3 x 10   Drop land x 16" 3 x 10   Lunge jump with TRX  3 x 10   Skater lateral hops x 3 laps   Lateral hops over mitali x 2 x 10   Agility x Ladder drills x 6 min  Lateral shuffle  [place]     Plan for Next Visit: See above       Patient Education and Home Exercises       Education provided:   - Emphasis on strength exercises at the gym  - continued care taken with loaded deep knee flexion    Written Home Exercises Provided: yes. Exercises were reviewed and Royal was able to demonstrate them prior to the end of the session.  Royal demonstrated good  understanding of the education provided. See EMR under Patient Instructions for exercises provided during therapy sessions    Assessment     10/30- Royal Martinez II tolerated PT session well with no  complaints of pain or discomfort. Patient continues to have impaired LE strength and biomechanics. Improving hip and ankle mobility. Good mechanics noted with all athletic movements today. Objective findings are improving with range of motion, joint mobility, functional strength, and functional activity performance.  Updated home exercises were issued during today's visit. Royal demonstrated good understanding of new exercises and will continue to progress at home until next follow-up.         Royal Is progressing well towards his goals.   Pt prognosis is Excellent.     Pt will continue to benefit from skilled outpatient physical therapy to address the deficits listed in the problem list box on initial evaluation, provide pt/family education and to maximize pt's level of independence in the home and community environment.     Pt's spiritual, cultural and educational needs considered and pt agreeable to plan of care and " goals.     Anticipated barriers to physical therapy: none  Goals:  Short Term Goals: 8-10 weeks  1. Pt will be compliant with HEP 50% of prescribed amount. MET  2. The pt to demo improvement in R knee ROM to equal L knee ROM pain free   3.  The pt to demo good quad set with proper hyperextension moment of the R knee MET  4. The pt to demo ambualting with elast restricitve AD witout major compensatory pattern R knee for at least 20 feet      Long Term Goals: 24-36 weeks   Pt will be compliant with % of prescribed amount.   The pt to demo pain free and uncompensated running mechanics x10 min on an indoor treadmill  The pt to demo tolerance to a squat at or bellow parallel with uncompensated mechanics x10   The pt to demo strength of R LE within 10% of L LE as demo'd on the biodex machine   The pt to demo a deficit of 10% or less on a triple hop, single leg broad jump and crossover hop compared to non operative LE.   The pt will report full participation in ADLs and IADLs without restrictions related to R knee.         Plan   Plan of care Certification: 6/16/2023 to 12/31/2023.     Outpatient Physical Therapy 2-3 times weekly for 36 weeks to include the following interventions: Electrical Stimulation  , Gait Training, Manual Therapy, Moist Heat/ Ice, Neuromuscular Re-ed, Patient Education, Therapeutic Activities, and Therapeutic Exercise.     Lionel Anand, PT, DPT

## 2023-11-01 ENCOUNTER — CLINICAL SUPPORT (OUTPATIENT)
Dept: REHABILITATION | Facility: HOSPITAL | Age: 23
End: 2023-11-01
Payer: COMMERCIAL

## 2023-11-01 DIAGNOSIS — M62.81 QUADRICEPS WEAKNESS: ICD-10-CM

## 2023-11-01 DIAGNOSIS — M25.661 DECREASED RANGE OF MOTION (ROM) OF RIGHT KNEE: Primary | ICD-10-CM

## 2023-11-01 PROCEDURE — 97140 MANUAL THERAPY 1/> REGIONS: CPT | Performed by: PHYSICAL THERAPIST

## 2023-11-01 PROCEDURE — 97530 THERAPEUTIC ACTIVITIES: CPT | Performed by: PHYSICAL THERAPIST

## 2023-11-01 PROCEDURE — 97110 THERAPEUTIC EXERCISES: CPT | Performed by: PHYSICAL THERAPIST

## 2023-11-06 NOTE — PROGRESS NOTES
[facility]   Physical Therapy Treatment Note      Name: Royal Martinez II  Clinic Number: [id number]    Therapy Diagnosis:   Encounter Diagnoses   Name Primary?    Decreased range of motion (ROM) of right knee Yes    Quadriceps weakness        Physician: ALEXANDER Emanuel MD    Visit Date: 11/1/2023  Physician Orders: PT Eval and Treat  Medical Diagnosis from Referral: Complex tear of lateral meniscus of right knee, unspecified whether old or current tear, initial encounter [S83.271A]  Evaluation Date: 6/16/2023  Authorization Period Expiration: 12/31/2023  Plan of Care Expiration: 12/31/2023  Visit # / Visits authorized: 8/25      Time In: 1700  Time Out: 1800  Total Billable Time: 60 minutes    DOS: 6/13/2023   3 months on 9/13  6 months on 12/13    PROCEDURES PERFORMED:   Right knee arthroscopic combined inside-out and all inside lateral meniscus repair, complex (CPT 67058-68)  Right knee arthroscopic chondroplasty (patella, lateral femoral condyle, lateral tibial plateau) (CPT 75620)  Right knee arthroscopic limited notchplasty  Right knee arthroscopic plica excision      POSTOPERATIVE PLAN: May begin weight-bearing to tolerance.  Transitioned from the T scope brace to the lateral  brace which he will wear for 6 weeks.  May discontinue and wean off crutches with the assistance of his physical therapist.  Focus on functional strengthening and ROM.  No deep squats or high impact activity.  - New PT orders sent to [facility]. Handoff between therapists. Patient is moving August 19th.     Subjective     Pt reports: 11/1- Knee is feeling good today, no complaints. He did leg strengthening yesterday at the gym.    He was compliant with home exercise program.  Response to previous treatment: n/a  Functional change: n/a    Pain: 0/10  Location: right knee     Objective      Pt presents with lateral knee  brace    Knee Passive Range of Motion:    Right  Left    Flexion 130  "135   Extension 5 hyper 5      Strength  Hip abduction   L- 34.1  R- 28.2#  LSI- 82.7    Knee flexion  L- 61.8  R- 65.4  LSI- 106%    Knee extension  L- 120.2  R- 98.2  LSI- 81.7%    Anterior step down test  L- 44  R- 35    Treatment     Royal received the treatments listed below:      Royal received the following manual therapy techniques: Joint mobilizations and Soft tissue Mobilization were applied to the: lower quarter for 10 minutes, including:     Performed Today    Mobility assessment x Impaired ankle mobility  Impaired knee flexion range of motion  Impaired hip mobility   Joint mobilization X  X  x Ankle manipulation  Hip manipulation  Knee flexion mobs   Soft tissue mobilization     Dry needling       Plan for Next Visit: Continue as indicated       Royal received therapeutic exercises to develop strength, endurance, ROM, flexibility, posture, and core stabilization for 10 minutes including:       Performed Today    Strength / range of motion assessment     Bike  5 minutes   Incline treadmill x 5 minutes at 10*   Dynamic warm up x Jog, A skips, lateral shuffle, carioca, bunny hops   Flexibility/mobility/ROM     Hip mobs  BTB 20x   Ankle mobs  BTB 20x        Strength     Sgl heel raise     Knee extension machine  4 x 8 75#   Hamstring curl machine  3 x 10     Plan for Next Visit: See above       Royal participated in neuromuscular re-education activities to improve: Balance, Coordination, Kinesthetic, Sense, Proprioception, and Posture for 00 minutes. The following activities were included:       Performed Today    Motor control/posture     Anterior step down  Decline 6" 3 x 15   MOBO RDL  3 x10   Side plank abduction     Balance/proprioception                 Plan for Next Visit: See above       Royal participated in dynamic functional therapeutic activities to improve functional performance for 40  minutes, including:       Performed Today    Jog/walk  1 min jog, 3 min walk x 3 rounds  Discussion regarding " jogging progression (1/3 blocks, progressing to walking a mile)   Functional strength     Split squat  2 x 35# DB   Split RDL  2 x 35# DB        Power     Plyometrics X  X  x Drop squats- sgl 3 x 10  Seated sgl box jumps  Lateral mitali hops dbl   Agility X  x Ice skaters- 3 laps  Pro agility- 50% effort  Ladder drills x 6 min  Lateral shuffle  [place]     Plan for Next Visit: See above       Patient Education and Home Exercises       Education provided:   - Emphasis on strength exercises at the gym  - continued care taken with loaded deep knee flexion    Written Home Exercises Provided: yes. Exercises were reviewed and Royal was able to demonstrate them prior to the end of the session.  Royal demonstrated good  understanding of the education provided. See EMR under Patient Instructions for exercises provided during therapy sessions    Assessment     10/30- Royal Maritnez II tolerated PT session well with no  complaints of pain or discomfort. Patient continues to have impaired LE strength and biomechanics. Improving hip and ankle mobility. Good mechanics noted with all athletic movements today. Objective findings are improving with range of motion, joint mobility, functional strength, and functional activity performance.  Updated home exercises were issued during today's visit. Royal demonstrated good understanding of new exercises and will continue to progress at home until next follow-up.         Royal Is progressing well towards his goals.   Pt prognosis is Excellent.     Pt will continue to benefit from skilled outpatient physical therapy to address the deficits listed in the problem list box on initial evaluation, provide pt/family education and to maximize pt's level of independence in the home and community environment.     Pt's spiritual, cultural and educational needs considered and pt agreeable to plan of care and goals.     Anticipated barriers to physical therapy: none  Goals:  Short Term Goals: 8-10  weeks  1. Pt will be compliant with HEP 50% of prescribed amount. MET  2. The pt to demo improvement in R knee ROM to equal L knee ROM pain free   3.  The pt to demo good quad set with proper hyperextension moment of the R knee MET  4. The pt to demo ambualting with elast restricitve AD witout major compensatory pattern R knee for at least 20 feet      Long Term Goals: 24-36 weeks   Pt will be compliant with % of prescribed amount.   The pt to demo pain free and uncompensated running mechanics x10 min on an indoor treadmill  The pt to demo tolerance to a squat at or bellow parallel with uncompensated mechanics x10   The pt to demo strength of R LE within 10% of L LE as demo'd on the biodex machine   The pt to demo a deficit of 10% or less on a triple hop, single leg broad jump and crossover hop compared to non operative LE.   The pt will report full participation in ADLs and IADLs without restrictions related to R knee.         Plan   Plan of care Certification: 6/16/2023 to 12/31/2023.     Outpatient Physical Therapy 2-3 times weekly for 36 weeks to include the following interventions: Electrical Stimulation  , Gait Training, Manual Therapy, Moist Heat/ Ice, Neuromuscular Re-ed, Patient Education, Therapeutic Activities, and Therapeutic Exercise.     Lionel Anand, PT, DPT

## 2023-11-08 ENCOUNTER — CLINICAL SUPPORT (OUTPATIENT)
Dept: REHABILITATION | Facility: HOSPITAL | Age: 23
End: 2023-11-08
Payer: COMMERCIAL

## 2023-11-08 DIAGNOSIS — M62.81 QUADRICEPS WEAKNESS: ICD-10-CM

## 2023-11-08 DIAGNOSIS — M25.661 DECREASED RANGE OF MOTION (ROM) OF RIGHT KNEE: Primary | ICD-10-CM

## 2023-11-08 PROCEDURE — 97530 THERAPEUTIC ACTIVITIES: CPT | Performed by: PHYSICAL THERAPIST

## 2023-11-08 PROCEDURE — 97112 NEUROMUSCULAR REEDUCATION: CPT | Performed by: PHYSICAL THERAPIST

## 2023-11-10 NOTE — PROGRESS NOTES
[facility]   Physical Therapy Treatment Note      Name: Royal Martinez II  Clinic Number: [id number]    Therapy Diagnosis:   Encounter Diagnoses   Name Primary?    Decreased range of motion (ROM) of right knee Yes    Quadriceps weakness        Physician: ALEXANDER Emanuel MD    Visit Date: 11/8/2023  Physician Orders: PT Eval and Treat  Medical Diagnosis from Referral: Complex tear of lateral meniscus of right knee, unspecified whether old or current tear, initial encounter [S83.271A]  Evaluation Date: 6/16/2023  Authorization Period Expiration: 12/31/2023  Plan of Care Expiration: 12/31/2023  Visit # / Visits authorized: 8/25      Time In: 1700  Time Out: 1800  Total Billable Time: 53 minutes    DOS: 6/13/2023   3 months on 9/13  6 months on 12/13    PROCEDURES PERFORMED:   Right knee arthroscopic combined inside-out and all inside lateral meniscus repair, complex (CPT 98625-01)  Right knee arthroscopic chondroplasty (patella, lateral femoral condyle, lateral tibial plateau) (CPT 45179)  Right knee arthroscopic limited notchplasty  Right knee arthroscopic plica excision      POSTOPERATIVE PLAN: May begin weight-bearing to tolerance.  Transitioned from the T scope brace to the lateral  brace which he will wear for 6 weeks.  May discontinue and wean off crutches with the assistance of his physical therapist.  Focus on functional strengthening and ROM.  No deep squats or high impact activity.  - New PT orders sent to [facility]. Handoff between therapists. Patient is moving August 19th.     Subjective     Pt reports: 11/8- He is going to do leg day at the gym after he leaves here. The knee has been feeling good.    He was compliant with home exercise program.  Response to previous treatment: n/a  Functional change: n/a    Pain: 0/10  Location: right knee     Objective      Pt presents with lateral knee  brace    Knee Passive Range of Motion:    Right  Left    Flexion 130  "135   Extension 5 hyper 5      Strength  Hip abduction   L- 34.1  R- 28.2#  LSI- 82.7    Knee flexion  L- 61.8  R- 65.4  LSI- 106%    Knee extension  L- 120.2  R- 98.2  LSI- 81.7%    Anterior step down test  L- 44  R- 35    Treatment     Royal received the treatments listed below:      Royal received the following manual therapy techniques: Joint mobilizations and Soft tissue Mobilization were applied to the: lower quarter for 00 minutes, including:     Performed Today    Mobility assessment x Impaired ankle mobility  Impaired knee flexion range of motion  Impaired hip mobility   Joint mobilization X  X  x Ankle manipulation  Hip manipulation  Knee flexion mobs   Soft tissue mobilization     Dry needling       Plan for Next Visit: Continue as indicated       Royal received therapeutic exercises to develop strength, endurance, ROM, flexibility, posture, and core stabilization for 5 minutes including:       Performed Today    Strength / range of motion assessment     Bike  5 minutes   Incline treadmill x 5 minutes at 10*   Flexibility/mobility/ROM     Hip mobs  BTB 20x   Ankle mobs  BTB 20x        Strength     Sgl heel raise     Knee extension machine  4 x 8 75#   Hamstring curl machine  3 x 10     Plan for Next Visit: See above       Royal participated in neuromuscular re-education activities to improve: Balance, Coordination, Kinesthetic, Sense, Proprioception, and Posture for 08 minutes. The following activities were included:       Performed Today    Dynamic warm up x Jog, A skips, lateral shuffle, carioca, bunny hops   Motor control/posture     Anterior step down  Decline 6" 3 x 15   MOBO RDL  3 x10   Side plank abduction     Balance/proprioception                 Plan for Next Visit: See above       Royal participated in dynamic functional therapeutic activities to improve functional performance for 40  minutes, including:       Performed Today    Jog/walk  1 min jog, 3 min walk x 3 rounds  Discussion regarding " jogging progression (1/3 blocks, progressing to walking a mile)   Functional strength     Split squat  2 x 35# DB   Split RDL  2 x 35# DB        Power     Plyometrics X  X  X  x Box jumps  Seated sgl box jumps  Lateral box jumps  Drop land- 2 to 1   Agility   x  X  x Ice skaters- 3 laps  Pro agility- 60% effort  Ladder drills x 6 min  Run -brake 8x     Plan for Next Visit: See above       Patient Education and Home Exercises       Education provided:   - Emphasis on strength exercises at the gym  - continued care taken with loaded deep knee flexion    Written Home Exercises Provided: yes. Exercises were reviewed and Royal was able to demonstrate them prior to the end of the session.  Royal demonstrated good  understanding of the education provided. See EMR under Patient Instructions for exercises provided during therapy sessions    Assessment     11/8- Royal Martinez II tolerated PT session well with no  complaints of pain or discomfort. Patient continues to have impaired quad strength and impaired activity tolerance. Objective findings are improving with range of motion, joint mobility, motor control, and functional activity performance.  Updated home exercises were not issued during today's visit. Royal demonstrated good understanding of new exercises and will continue to progress at home until next follow-up.           Royal Is progressing well towards his goals.   Pt prognosis is Excellent.     Pt will continue to benefit from skilled outpatient physical therapy to address the deficits listed in the problem list box on initial evaluation, provide pt/family education and to maximize pt's level of independence in the home and community environment.     Pt's spiritual, cultural and educational needs considered and pt agreeable to plan of care and goals.     Anticipated barriers to physical therapy: none  Goals:  Short Term Goals: 8-10 weeks  1. Pt will be compliant with HEP 50% of prescribed amount. MET  2. The pt to  demo improvement in R knee ROM to equal L knee ROM pain free   3.  The pt to demo good quad set with proper hyperextension moment of the R knee MET  4. The pt to demo ambualting with elast restricitve AD witout major compensatory pattern R knee for at least 20 feet      Long Term Goals: 24-36 weeks   Pt will be compliant with % of prescribed amount.   The pt to demo pain free and uncompensated running mechanics x10 min on an indoor treadmill  The pt to demo tolerance to a squat at or bellow parallel with uncompensated mechanics x10   The pt to demo strength of R LE within 10% of L LE as demo'd on the biodex machine   The pt to demo a deficit of 10% or less on a triple hop, single leg broad jump and crossover hop compared to non operative LE.   The pt will report full participation in ADLs and IADLs without restrictions related to R knee.         Plan   Plan of care Certification: 6/16/2023 to 12/31/2023.     Outpatient Physical Therapy 2-3 times weekly for 36 weeks to include the following interventions: Electrical Stimulation  , Gait Training, Manual Therapy, Moist Heat/ Ice, Neuromuscular Re-ed, Patient Education, Therapeutic Activities, and Therapeutic Exercise.     Lionel Anand, PT, DPT

## 2023-11-29 ENCOUNTER — PATIENT MESSAGE (OUTPATIENT)
Dept: REHABILITATION | Facility: HOSPITAL | Age: 23
End: 2023-11-29
Payer: COMMERCIAL

## 2023-12-11 NOTE — PROGRESS NOTES
CC: Right knee post-op     DATE OF PROCEDURE: 6/13/2023   PROCEDURES PERFORMED:   Right knee arthroscopic combined inside-out and all inside lateral meniscus repair, complex (CPT 63094-64)  Right knee arthroscopic chondroplasty (patella, lateral femoral condyle, lateral tibial plateau) (CPT 73622)  Right knee arthroscopic limited notchplasty  Right knee arthroscopic plica excision    Royal Martinez II, presents today for follow up appointment of his left knee. Patient is now 6 months status post above procedure.  States the knee feels good.  No complaints.  No swelling.  No mechanical symptoms.  Would like to return to some pickup basketball and recreational sports.    Prior Hx 10/9/2023:  Royal Martinez II reports to be doing well just under 4 mos s/p the above mentioned procedure. Going to PT at the [place] location with Katherine. Seeing good progress daily.  Reports no pain.  No complaints.  Comes in today with his lateral  brace.    REVIEW OF SYSTEMS:   Constitution: Negative. Negative for chills, fever and night sweats.    Hematologic/Lymphatic: Negative for bleeding problem. Does not bruise/bleed easily.   Skin: Negative for dry skin, itching and rash.   Musculoskeletal: Negative for falls.  Negative for right knee pain and muscle weakness.     All other review of symptoms were reviewed and found to be noncontributory.     PAST MEDICAL HISTORY:   Past Medical History:   Diagnosis Date    ALLERGIC RHINITIS     Allergic rhinitis 11/23/2020    Asthma, currently inactive     Childhood asthma 11/23/2020    Obesity     Overweight(278.02)     Patellar tendonitis of left knee      PAST SURGICAL HISTORY:   Past Surgical History:   Procedure Laterality Date    ARTHROSCOPIC CHONDROPLASTY OF KNEE JOINT Right 6/13/2023    Procedure: ARTHROSCOPY, KNEE, WITH CHONDROPLASTY;  Surgeon: ALEXANDER Emanuel MD;  Location: [facility];  Service: Orthopedics;  Laterality: Right;    ARTHROSCOPY,KNEE,WITH MENISCUS REPAIR Right  2023    Procedure: ARTHROSCOPY KNEE ,WITH MENISCUS REPAIR;  Surgeon: ALEXANDER Emanuel MD;  Location: [facility] OR;  Service: Orthopedics;  Laterality: Right;  Regional w/Catheter, Adductor, 0.2% Ropivacaine    CIRCUMCISION       FAMILY HISTORY:   Family History   Problem Relation Age of Onset    Alcohol abuse Paternal Grandfather         now     Hypertension Father     Obesity Father     Hypertension Maternal Grandfather     Hypertension Mother     Breast cancer Mother 40    Diabetes Unknown         maternal side    Acne Maternal Aunt     Psoriasis Cousin     Diabetes Maternal Uncle     Depression Neg Hx     Suicidality Neg Hx     Eczema Neg Hx     Melanoma Neg Hx     Lupus Neg Hx      SOCIAL HISTORY:   Social History     Socioeconomic History    Marital status: Single   Occupational History    Occupation: Student   Tobacco Use    Smoking status: Never    Smokeless tobacco: Never   Substance and Sexual Activity    Alcohol use: No    Drug use: Never    Sexual activity: Not Currently   Social History Narrative    Lives with parents and sib, no pets.    11th grade- [employer].                                  MEDICATIONS:     Current Outpatient Medications:     aspirin (ECOTRIN) 81 MG EC tablet, Take 1 tablet (81 mg total) by mouth 2 (two) times a day. for 14 days, Disp: 28 tablet, Rfl: 0    fluticasone propionate (FLONASE) 50 mcg/actuation nasal spray, 1 spray (50 mcg total) by Each Nostril route 2 (two) times daily as needed for Rhinitis (nasal congestion). (Patient not taking: Reported on 2023), Disp: 15 g, Rfl: 0    ibuprofen (ADVIL,MOTRIN) 800 MG tablet, Take 1 tablet (800 mg total) by mouth every 6 (six) hours as needed for Pain. (Patient not taking: Reported on 2023), Disp: 20 tablet, Rfl: 0    ondansetron (ZOFRAN-ODT) 4 MG TbDL, Take 1 tablet (4 mg total) by mouth every 8 (eight) hours as needed (nausea). (Patient not taking: Reported on 2023), Disp: 30 tablet, Rfl: 0    " oxyCODONE (ROXICODONE) 5 MG immediate release tablet, Take 1-2 tablets (5-10 mg total) by mouth every 4 to 6 hours as needed for Pain. (Patient not taking: Reported on 7/25/2023), Disp: 28 tablet, Rfl: 0    ALLERGIES:   Review of patient's allergies indicates:  No Known Allergies     PHYSICAL EXAMINATION:  BP (!) 145/79   Pulse 72   Ht 6' 2" (1.88 m)   Wt 113 kg (249 lb 1.9 oz)   BMI 31.99 kg/m²   General: Well-developed well-nourished 23 y.o. malein no acute distress   Cardiovascular: Regular rhythm by palpation of distal pulse, normal color and temperature, no concerning varicosities on symptomatic side   Lungs: No labored breathing or wheezing appreciated   Neuro: Alert and oriented ×3   Psychiatric: well oriented to person, place and time, demonstrates normal mood and affect   Skin: No rashes, lesions or ulcers, normal temperature, turgor, and texture on involved extremity    Ortho/SPM Exam  Examination of the right knee again shows that the incisions are well healed.  No effusion.  Good quadriceps bulk and strength.  Full range of motion, symmetric to the other side.  No pain laterally with forced flexion and extension.  No lateral joint line tenderness.    IMAGING:  None.    ASSESSMENT:      ICD-10-CM ICD-9-CM   1. Complex tear of lateral meniscus of right knee, unspecified whether old or current tear, subsequent encounter  S83.271D V58.89     836.1   2. S/P lateral meniscus repair of right knee  Z98.890 V45.89     Doing well.    PLAN:     The patient's knee looks good.  He is 6 months out from surgery.  Discussed a plan for return to cutting/pivoting activity and recreational sports.  I would like for him to have some screening functional testing by the physical therapy team before he is cleared formally to return to sports.  Usually this testing includes the CÃ[place] sport cord.  We will send a message to his therapist to make sure he is scheduled to get that done.  He is cleared to return to sports once he " has passed all functional testing by his therapist. Otherwise he looks great. May return to clinic as needed to see me.      Procedures

## 2023-12-12 ENCOUNTER — OFFICE VISIT (OUTPATIENT)
Dept: SPORTS MEDICINE | Facility: CLINIC | Age: 23
End: 2023-12-12
Payer: COMMERCIAL

## 2023-12-12 VITALS
BODY MASS INDEX: 31.97 KG/M2 | SYSTOLIC BLOOD PRESSURE: 145 MMHG | DIASTOLIC BLOOD PRESSURE: 79 MMHG | HEIGHT: 74 IN | HEART RATE: 72 BPM | WEIGHT: 249.13 LBS

## 2023-12-12 DIAGNOSIS — S83.271D COMPLEX TEAR OF LATERAL MENISCUS OF RIGHT KNEE, UNSPECIFIED WHETHER OLD OR CURRENT TEAR, SUBSEQUENT ENCOUNTER: Primary | ICD-10-CM

## 2023-12-12 DIAGNOSIS — Z98.890 S/P LATERAL MENISCUS REPAIR OF RIGHT KNEE: ICD-10-CM

## 2023-12-12 PROCEDURE — 1159F MED LIST DOCD IN RCRD: CPT | Mod: CPTII,S$GLB,, | Performed by: ORTHOPAEDIC SURGERY

## 2023-12-12 PROCEDURE — 99999 PR PBB SHADOW E&M-EST. PATIENT-LVL III: ICD-10-PCS | Mod: PBBFAC,,, | Performed by: ORTHOPAEDIC SURGERY

## 2023-12-12 PROCEDURE — 1159F PR MEDICATION LIST DOCUMENTED IN MEDICAL RECORD: ICD-10-PCS | Mod: CPTII,S$GLB,, | Performed by: ORTHOPAEDIC SURGERY

## 2023-12-12 PROCEDURE — 3077F PR MOST RECENT SYSTOLIC BLOOD PRESSURE >= 140 MM HG: ICD-10-PCS | Mod: CPTII,S$GLB,, | Performed by: ORTHOPAEDIC SURGERY

## 2023-12-12 PROCEDURE — 3078F DIAST BP <80 MM HG: CPT | Mod: CPTII,S$GLB,, | Performed by: ORTHOPAEDIC SURGERY

## 2023-12-12 PROCEDURE — 3008F BODY MASS INDEX DOCD: CPT | Mod: CPTII,S$GLB,, | Performed by: ORTHOPAEDIC SURGERY

## 2023-12-12 PROCEDURE — 99999 PR PBB SHADOW E&M-EST. PATIENT-LVL III: CPT | Mod: PBBFAC,,, | Performed by: ORTHOPAEDIC SURGERY

## 2023-12-12 PROCEDURE — 3008F PR BODY MASS INDEX (BMI) DOCUMENTED: ICD-10-PCS | Mod: CPTII,S$GLB,, | Performed by: ORTHOPAEDIC SURGERY

## 2023-12-12 PROCEDURE — 3078F PR MOST RECENT DIASTOLIC BLOOD PRESSURE < 80 MM HG: ICD-10-PCS | Mod: CPTII,S$GLB,, | Performed by: ORTHOPAEDIC SURGERY

## 2023-12-12 PROCEDURE — 3077F SYST BP >= 140 MM HG: CPT | Mod: CPTII,S$GLB,, | Performed by: ORTHOPAEDIC SURGERY

## 2023-12-12 PROCEDURE — 99213 PR OFFICE/OUTPT VISIT, EST, LEVL III, 20-29 MIN: ICD-10-PCS | Mod: S$GLB,,, | Performed by: ORTHOPAEDIC SURGERY

## 2023-12-12 PROCEDURE — 99213 OFFICE O/P EST LOW 20 MIN: CPT | Mod: S$GLB,,, | Performed by: ORTHOPAEDIC SURGERY

## 2024-03-05 ENCOUNTER — OFFICE VISIT (OUTPATIENT)
Dept: PRIMARY CARE CLINIC | Facility: CLINIC | Age: 24
End: 2024-03-05
Payer: COMMERCIAL

## 2024-03-05 VITALS
HEART RATE: 89 BPM | WEIGHT: 255.75 LBS | TEMPERATURE: 98 F | HEIGHT: 74 IN | OXYGEN SATURATION: 99 % | BODY MASS INDEX: 32.82 KG/M2

## 2024-03-05 DIAGNOSIS — H92.02 ACUTE EAR PAIN, LEFT: ICD-10-CM

## 2024-03-05 DIAGNOSIS — H60.502 ACUTE OTITIS EXTERNA OF LEFT EAR, UNSPECIFIED TYPE: ICD-10-CM

## 2024-03-05 DIAGNOSIS — H66.92 LEFT OTITIS MEDIA, UNSPECIFIED OTITIS MEDIA TYPE: Primary | ICD-10-CM

## 2024-03-05 PROCEDURE — 99213 OFFICE O/P EST LOW 20 MIN: CPT | Mod: S$GLB,,, | Performed by: NURSE PRACTITIONER

## 2024-03-05 PROCEDURE — 1159F MED LIST DOCD IN RCRD: CPT | Mod: CPTII,S$GLB,, | Performed by: NURSE PRACTITIONER

## 2024-03-05 PROCEDURE — 99999 PR PBB SHADOW E&M-EST. PATIENT-LVL III: CPT | Mod: PBBFAC,,, | Performed by: NURSE PRACTITIONER

## 2024-03-05 PROCEDURE — 3008F BODY MASS INDEX DOCD: CPT | Mod: CPTII,S$GLB,, | Performed by: NURSE PRACTITIONER

## 2024-03-05 PROCEDURE — 1160F RVW MEDS BY RX/DR IN RCRD: CPT | Mod: CPTII,S$GLB,, | Performed by: NURSE PRACTITIONER

## 2024-03-05 RX ORDER — AMOXICILLIN AND CLAVULANATE POTASSIUM 875; 125 MG/1; MG/1
1 TABLET, FILM COATED ORAL EVERY 12 HOURS
Qty: 14 TABLET | Refills: 0 | Status: SHIPPED | OUTPATIENT
Start: 2024-03-05 | End: 2024-03-12

## 2024-03-05 RX ORDER — NEOMYCIN SULFATE, POLYMYXIN B SULFATE AND HYDROCORTISONE 10; 3.5; 1 MG/ML; MG/ML; [USP'U]/ML
3 SUSPENSION/ DROPS AURICULAR (OTIC) 3 TIMES DAILY
Qty: 10 ML | Refills: 0 | Status: SHIPPED | OUTPATIENT
Start: 2024-03-05

## 2024-03-05 NOTE — PROGRESS NOTES
HPI     Chief Complaint  Chief Complaint   Patient presents with    Otalgia       HPI  Royal Martinez II is a 23 y.o. male with multiple medical diagnoses as listed in the medical history and problem list that presents for Left Ear Pain.  This patient is new to me.    Left Ear Pain: Tenderness and mild decrease in hearing started a day ago. Denies trauma. Denies balance issues. Reports a current sinus flare-up that he thinks is contributing to ear issues. When washing hair water and shampoo often drains into ear canal. Ear canal is swollen on inspection. Tympanic membrane is injected with mild bulging. Denies fever, chills, night sweats or body aches.    History     PAST MEDICAL HISTORY:  Past Medical History:   Diagnosis Date    ALLERGIC RHINITIS     Allergic rhinitis 11/23/2020    Asthma, currently inactive     Childhood asthma 11/23/2020    Obesity     Overweight(278.02)     Patellar tendonitis of left knee        PAST SURGICAL HISTORY:  Past Surgical History:   Procedure Laterality Date    ARTHROSCOPIC CHONDROPLASTY OF KNEE JOINT Right 6/13/2023    Procedure: ARTHROSCOPY, KNEE, WITH CHONDROPLASTY;  Surgeon: ALEXANDER Emanuel MD;  Location: [facility] OR;  Service: Orthopedics;  Laterality: Right;    ARTHROSCOPY,KNEE,WITH MENISCUS REPAIR Right 6/13/2023    Procedure: ARTHROSCOPY KNEE ,WITH MENISCUS REPAIR;  Surgeon: ALEXANDER Emanuel MD;  Location: [facility] OR;  Service: Orthopedics;  Laterality: Right;  Regional w/Catheter, Adductor, 0.2% Ropivacaine    CIRCUMCISION         SOCIAL HISTORY:  Social History     Socioeconomic History    Marital status: Single   Occupational History    Occupation: Student   Tobacco Use    Smoking status: Never    Smokeless tobacco: Never   Substance and Sexual Activity    Alcohol use: No    Drug use: Never    Sexual activity: Not Currently   Social History Narrative    Lives with parents and sib, no pets.    11th grade- [employer]                                  Social  Determinants of Health     Financial Resource Strain: Low Risk  (3/5/2024)    Overall Financial Resource Strain (CARDIA)     Difficulty of Paying Living Expenses: Not hard at all   Food Insecurity: No Food Insecurity (3/5/2024)    Hunger Vital Sign     Worried About Running Out of Food in the Last Year: Never true     Ran Out of Food in the Last Year: Never true   Transportation Needs: No Transportation Needs (3/5/2024)    PRAPARE - Transportation     Lack of Transportation (Medical): No     Lack of Transportation (Non-Medical): No   Physical Activity: Sufficiently Active (3/5/2024)    Exercise Vital Sign     Days of Exercise per Week: 5 days     Minutes of Exercise per Session: 120 min   Stress: No Stress Concern Present (3/5/2024)    [place] of Occupational [employer] - Occupational Stress Questionnaire     Feeling of Stress : Only a little   Social Connections: Unknown (3/5/2024)    Social Connection and Isolation Panel [NHANES]     Frequency of Communication with Friends and Family: More than three times a week     Frequency of Social Gatherings with Friends and Family: More than three times a week     Active Member of Clubs or Organizations: No     Attends Club or Organization Meetings: Never     Marital Status: Never    Housing Stability: Low Risk  (3/5/2024)    Housing Stability Vital Sign     Unable to Pay for Housing in the Last Year: No     Number of Places Lived in the Last Year: 1     Unstable Housing in the Last Year: No       FAMILY HISTORY:  Family History   Problem Relation Age of Onset    Alcohol abuse Paternal Grandfather         now     Hypertension Father     Obesity Father     Hypertension Maternal Grandfather     Hypertension Mother     Breast cancer Mother 40    Diabetes Unknown         maternal side    Acne Maternal Aunt     Psoriasis Cousin     Diabetes Maternal Uncle     Depression Neg Hx     Suicidality Neg Hx     Eczema Neg Hx     Melanoma Neg Hx     Lupus Neg Hx   "      ALLERGIES AND MEDICATIONS: updated and reviewed.  Review of patient's allergies indicates:  No Known Allergies  Current Outpatient Medications   Medication Sig Dispense Refill    amoxicillin-clavulanate 875-125mg (AUGMENTIN) 875-125 mg per tablet Take 1 tablet by mouth every 12 (twelve) hours. for 7 days 14 tablet 0    neomycin-polymyxin-hydrocortisone (CORTISPORIN) 3.5-10,000-1 mg/mL-unit/mL-% otic suspension Place 3 drops into the left ear 3 (three) times daily. 10 mL 0     No current facility-administered medications for this visit.       Exam     ROS  Review of Systems   Constitutional:  Negative for appetite change, chills, fatigue and fever.   HENT:  Positive for ear pain. Negative for congestion, postnasal drip, rhinorrhea, sneezing, sore throat and tinnitus.    Respiratory:  Negative for cough and shortness of breath.    Cardiovascular:  Negative for chest pain and palpitations.   Gastrointestinal:  Negative for abdominal pain, constipation, diarrhea, nausea and vomiting.   Genitourinary:  Negative for difficulty urinating and dysuria.   Musculoskeletal:  Negative for arthralgias.   Neurological:  Negative for headaches.           Physical Exam  Vitals:    03/05/24 1413   Pulse: 89   Temp: 98.3 °F (36.8 °C)   SpO2: 99%   Weight: 116 kg (255 lb 11.7 oz)   Height: 6' 2" (1.88 m)    Body mass index is 32.83 kg/m².  Weight: 116 kg (255 lb 11.7 oz)   Height: 6' 2" (188 cm)   Physical Exam  Constitutional:       General: He is not in acute distress.     Appearance: Normal appearance. He is well-developed. He is obese.   HENT:      Head: Normocephalic and atraumatic.      Left Ear: Swelling and tenderness present. A middle ear effusion is present. Tympanic membrane is injected.      Nose: Nose normal.      Mouth/Throat:      Pharynx: No oropharyngeal exudate.   Eyes:      Pupils: Pupils are equal, round, and reactive to light.   Cardiovascular:      Rate and Rhythm: Normal rate and regular rhythm.      Heart " sounds: No murmur heard.     No friction rub. No gallop.   Pulmonary:      Effort: Pulmonary effort is normal. No respiratory distress.      Breath sounds: Normal breath sounds. No wheezing.   Abdominal:      General: Bowel sounds are normal.      Palpations: Abdomen is soft.   Musculoskeletal:      Cervical back: Neck supple.   Lymphadenopathy:      Cervical: No cervical adenopathy.   Skin:     General: Skin is warm and dry.   Neurological:      Mental Status: He is alert and oriented to person, place, and time.   Psychiatric:         Mood and Affect: Mood normal.           Health Maintenance         Date Due Completion Date    TETANUS VACCINE 07/12/2022 7/12/2012    COVID-19 Vaccine (4 - 2023-24 season) 09/01/2023 1/26/2022            Assessment & Plan     Assessment & Plan  Problem List Items Addressed This Visit          ENT    Otitis media - Primary    Relevant Medications    amoxicillin-clavulanate 875-125mg (AUGMENTIN) 875-125 mg per tablet    neomycin-polymyxin-hydrocortisone (CORTISPORIN) 3.5-10,000-1 mg/mL-unit/mL-% otic suspension     Other Visit Diagnoses       Acute otitis externa of left ear, unspecified type        Relevant Medications    amoxicillin-clavulanate 875-125mg (AUGMENTIN) 875-125 mg per tablet    neomycin-polymyxin-hydrocortisone (CORTISPORIN) 3.5-10,000-1 mg/mL-unit/mL-% otic suspension    Acute ear pain, left        Relevant Medications    amoxicillin-clavulanate 875-125mg (AUGMENTIN) 875-125 mg per tablet    neomycin-polymyxin-hydrocortisone (CORTISPORIN) 3.5-10,000-1 mg/mL-unit/mL-% otic suspension              Health Maintenance reviewed: Deferred at this time    Follow-up: If symptoms worsen or fail to improve     20+ minutes of total time spent on the encounter, which includes face to face time and non-face to face time preparing to see the patient (eg, review of tests), Obtaining and/or reviewing separately obtained history, documenting clinical information in the electronic or  other health record, independently interpreting results (not separately reported) and communicating results to the patient/family/caregiver, or Care coordination (not separately reported).      Sincerely,  Hai Bolton NP

## 2024-06-07 NOTE — PROGRESS NOTES
"CC: Right knee pain    22 y.o. Male who presents as a new patient to me. He is graduating from \[employer]\"" in [employer] next week. Complaint is right knee pain and swelling after an injury sustained playing basketball 1 month prior. He was seen at [facility] in [place] and referred here to us for surgery as he is moving back home after graduation. Patient says he felt a pop whenever he was jumping up playing basketball. He felt immediate pain and swelling in the knee afterwards. MRI ordered by outside provider demonstrates extensive tear of the lateral meniscus. He says he does not have much pain at this point. He does have an effusion but says it has decreased significantly since his initial injury. He does endorse initial mechanical symptoms/"locking" after the injury but does not have this any longer. Endorses subjective instability with pivoting. Worse with activity. Better with rest. Treatment thus far has included rest, activity modifications. Here today to discuss diagnosis and treatment options.      He has no pertinent PMHx. 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  Negative for tobacco.   Negative for diabetes.    Pain Score: 0-No pain    REVIEW OF SYSTEMS:   Constitution: Negative. Negative for chills, fever and night sweats.    Hematologic/Lymphatic: Negative for bleeding problem. Does not bruise/bleed easily.   Skin: Negative for dry skin, itching and rash.   Musculoskeletal: Negative for falls. Negative for right knee pain and muscle weakness.     All other review of symptoms were reviewed and found to be noncontributory.     PAST MEDICAL HISTORY:   Past Medical History:   Diagnosis Date    ALLERGIC RHINITIS     Allergic rhinitis 11/23/2020    Asthma, currently inactive     Childhood asthma 11/23/2020    Obesity     Overweight(278.02)     Patellar tendonitis of left knee        PAST SURGICAL HISTORY:   Past Surgical History:   Procedure Laterality Date    " "CIRCUMCISION         FAMILY HISTORY:   Family History   Problem Relation Age of Onset    Alcohol abuse Paternal Grandfather         now     Hypertension Father     Obesity Father     Hypertension Maternal Grandfather     Hypertension Mother     Breast cancer Mother 40    Diabetes Unknown         maternal side    Acne Maternal Aunt     Psoriasis Cousin     Diabetes Maternal Uncle     Depression Neg Hx     Suicidality Neg Hx     Eczema Neg Hx     Melanoma Neg Hx     Lupus Neg Hx        SOCIAL HISTORY:   Social History     Socioeconomic History    Marital status: Single   Occupational History    Occupation: Student   Tobacco Use    Smoking status: Never    Smokeless tobacco: Never   Substance and Sexual Activity    Alcohol use: No    Drug use: Never    Sexual activity: Not Currently   Social History Narrative    Lives with parents and sib, no pets.    11th grade- [id number]                                    MEDICATIONS:     Current Outpatient Medications:     fluticasone propionate (FLONASE) 50 mcg/actuation nasal spray, 1 spray (50 mcg total) by Each Nostril route 2 (two) times daily as needed for Rhinitis (nasal congestion). (Patient not taking: Reported on 2023), Disp: 15 g, Rfl: 0    ibuprofen (ADVIL,MOTRIN) 800 MG tablet, Take 1 tablet (800 mg total) by mouth every 6 (six) hours as needed for Pain. (Patient not taking: Reported on 2023), Disp: 20 tablet, Rfl: 0    ALLERGIES:   Review of patient's allergies indicates:  No Known Allergies     PHYSICAL EXAMINATION:  BP (!) 145/79   Pulse 91   Ht 6' 2" (1.88 m)   Wt 112.5 kg (248 lb)   BMI 31.84 kg/m²   General: Well-developed well-nourished 22 y.o. malein no acute distress   Cardiovascular: Regular rhythm by palpation of distal pulse, normal color and temperature, no concerning varicosities on symptomatic side   Lungs: No labored breathing or wheezing appreciated   Neuro: Alert and oriented ×3   Psychiatric: well oriented to " person, place and time, demonstrates normal mood and affect   Skin: No rashes, lesions or ulcers, normal temperature, turgor, and texture on involved extremity    Ortho/SPM Exam  Examination of the right knee demonstrates intact extensor mechanism. +1 effusion. Central patellar tracking. No patellar apprehension. Normal patellar mobility. Full extension. Flexion to 130. Mild pain posterior/laterally with forced flexion. Mild tenderness along the lateral joint line, none medially. Positive Imelda's for pain laterally. Potentially mild laxity with Lachman compared to contralateral knee but firm endpoint felt. Stable to varus/valgus stress testing at 0 and 30 deg. Negative posterior drawer. Negative dial test at 30 and 90. NVI.    IMAGING:  X-rays done 4/5/20234 including standing, weight bearing AP and flexion bilateral knees, RIGHT knee lateral and sunrise views ordered and images reviewed by me show:    The joint spaces of all 3 compartments of the right knee are well maintained.  No joint effusion.  No acute fracture or dislocation.    MRI done 4/6/2023 reviewed by me and discussed with patient. Study shows:  1. Extensive lateral meniscal tear involving peripheral inferior articular surface contiguously involving body and posterior horn.  There is a separate oblique horizontal tear posterior horn lateral meniscus involving superior and inferior articular surfaces.  2. Large knee joint effusion.  Thickened medial patella plica.       ASSESSMENT:      ICD-10-CM ICD-9-CM   1. Acute lateral meniscus tear of right knee, initial encounter  S83.281A 836.1   2. Internal derangement of right knee  M23.91 717.9   3. Effusion of right knee  M25.461 719.06       PLAN:     -Findings and treatment options were discussed with the patient  -We have discussed a variety of treatment options including medications, injections, physical therapy and other alternative treatments. I also explained the indications, risks and benefits of  surgery. Given the patient's hx and examination, he will likely need surgery. We will have him follow up with Dr. Emanuel for surgical discussion. Pt agrees with treatment plan, however he graduates from [employer] next week. Closest time he is able to follow up is 5/23.    I made the decision to obtain old records of the patient including previous notes and imaging. I independently reviewed and interpreted prior imaging today.       1. Ice compress to the affected area 2-3x a day for 15-20 minutes as needed for pain management and swelling.  2. OTC antiinflammatories daily PRN for pain management.  3. RTC to see Dr. Emanuel for surgical discussion.    All of the patient's questions were answered and the patient will contact us if they have any questions or concerns in the interim.    Home

## 2024-09-20 ENCOUNTER — OFFICE VISIT (OUTPATIENT)
Dept: INTERNAL MEDICINE | Facility: CLINIC | Age: 24
End: 2024-09-20
Payer: COMMERCIAL

## 2024-09-20 ENCOUNTER — HOSPITAL ENCOUNTER (OUTPATIENT)
Dept: RADIOLOGY | Facility: HOSPITAL | Age: 24
Discharge: HOME | End: 2024-09-20
Attending: NURSE PRACTITIONER
Payer: COMMERCIAL

## 2024-09-20 VITALS
WEIGHT: 255.5 LBS | HEIGHT: 74 IN | OXYGEN SATURATION: 98 % | BODY MASS INDEX: 32.79 KG/M2 | HEART RATE: 98 BPM | TEMPERATURE: 97 F | DIASTOLIC BLOOD PRESSURE: 70 MMHG | SYSTOLIC BLOOD PRESSURE: 146 MMHG

## 2024-09-20 DIAGNOSIS — S90.851A FOREIGN BODY OF SKIN OF PLANTAR ASPECT OF FOOT, RIGHT, INITIAL ENCOUNTER: ICD-10-CM

## 2024-09-20 DIAGNOSIS — S91.331A PUNCTURE WOUND OF RIGHT FOOT, INITIAL ENCOUNTER: Primary | ICD-10-CM

## 2024-09-20 PROCEDURE — 3077F SYST BP >= 140 MM HG: CPT | Mod: CPTII,S$GLB,, | Performed by: NURSE PRACTITIONER

## 2024-09-20 PROCEDURE — 3008F BODY MASS INDEX DOCD: CPT | Mod: CPTII,S$GLB,, | Performed by: NURSE PRACTITIONER

## 2024-09-20 PROCEDURE — 99213 OFFICE O/P EST LOW 20 MIN: CPT | Mod: 25,S$GLB,, | Performed by: NURSE PRACTITIONER

## 2024-09-20 PROCEDURE — 73630 X-RAY EXAM OF FOOT: CPT | Mod: 26,RT,, | Performed by: RADIOLOGY

## 2024-09-20 PROCEDURE — 1159F MED LIST DOCD IN RCRD: CPT | Mod: CPTII,S$GLB,, | Performed by: NURSE PRACTITIONER

## 2024-09-20 PROCEDURE — 73630 X-RAY EXAM OF FOOT: CPT | Mod: TC,RT

## 2024-09-20 PROCEDURE — 90715 TDAP VACCINE 7 YRS/> IM: CPT | Mod: S$GLB,,, | Performed by: NURSE PRACTITIONER

## 2024-09-20 PROCEDURE — 90471 IMMUNIZATION ADMIN: CPT | Mod: S$GLB,,, | Performed by: NURSE PRACTITIONER

## 2024-09-20 PROCEDURE — 3078F DIAST BP <80 MM HG: CPT | Mod: CPTII,S$GLB,, | Performed by: NURSE PRACTITIONER

## 2024-09-20 PROCEDURE — 99999 PR PBB SHADOW E&M-EST. PATIENT-LVL IV: CPT | Mod: PBBFAC,,, | Performed by: NURSE PRACTITIONER

## 2024-09-20 NOTE — PROGRESS NOTES
Subjective:      Patient ID: Royal Martinez II is a 24 y.o. male.    Chief Complaint: Foot Pain    History of Present Illness    CHIEF COMPLAINT:  Royal presents today with a painful spot on the bottom of the right foot.    FOOT PAIN:  He reports pain in the bottom of his right foot that began approximately one week ago. The pain is described as sharp and localized to a specific area. Pain intensity is 3/10 when not walking but increases significantly with ambulation. He denies any numbness, tingling, or drainage from the affected area. He does not recall stepping on anything that could have caused the pain.    SOCIAL HISTORY:  He reports always wearing shoes both indoors and outdoors.    MEDICAL HISTORY:  He reports being up to date on tetanus vaccination.      ROS:  General: -fever, -chills, -fatigue, -weight gain, -weight loss  Eyes: -vision changes, -redness, -discharge  ENT: -ear pain, -nasal congestion, -sore throat  Cardiovascular: -chest pain, -palpitations, -lower extremity edema  Respiratory: -cough, -shortness of breath  Gastrointestinal: -abdominal pain, -nausea, -vomiting, -diarrhea, -constipation, -blood in stool  Genitourinary: -dysuria, -hematuria, -frequency  Musculoskeletal: +joint pain, -muscle pain  Skin: -rash, -lesion  Neurological: -headache, -dizziness, -numbness, -tingling  Psychiatric: -anxiety, -depression, -sleep difficulty          Patient Active Problem List   Diagnosis    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 (Patient not taking: Reported on 9/20/2024), Disp: 10 mL, Rfl: 0      Objective:   BP (!) 146/70 (BP Location: Right arm, Patient Position: Sitting, BP Method: Large (Manual))   Pulse 98   Temp 97 °F (36.1 °C) (Tympanic)   Ht 6' 2" (1.88 m)   Wt 115.9 kg (255 lb 8.2 oz)   SpO2 98%   BMI 32.81 kg/m²     Physical Exam             Physical Exam  Vitals and nursing note reviewed.   Constitutional:       General: He is awake. He is not in acute distress.     Appearance: Normal appearance. He is well-developed, well-groomed and normal weight. He is not ill-appearing, toxic-appearing or diaphoretic.   HENT:      Head: Normocephalic and atraumatic.   Cardiovascular:      Rate and Rhythm: Normal rate and regular rhythm.      Pulses:           Dorsalis pedis pulses are 2+ on the left side.        Posterior tibial pulses are 2+ on the left side.   Pulmonary:      Effort: Pulmonary effort is normal.      Breath sounds: Normal breath sounds.   Musculoskeletal:         General: Normal range of motion.      Cervical back: Normal range of motion.        Feet:    Feet:      Left foot:      Skin integrity: Dry skin present. No erythema or warmth.      Comments: See attached photo  Skin:     General: Skin is warm and dry.   Neurological:      Mental Status: He is alert.      Gait: Gait abnormal.   Psychiatric:         Attention and Perception: Attention and perception normal.         Mood and Affect: Mood and affect normal.         Speech: Speech normal.         Behavior: Behavior normal. Behavior is cooperative.         Cognition and Memory: Cognition normal.          Assessment:     1. Puncture wound of right foot, initial encounter    2. Foreign body of skin of plantar aspect of foot, right, initial encounter      Plan:   Assessment & Plan    Suspected foreign body in right plantar foot, likely due to stepping on something  Considered possibility of infection due to patient's report of green " appearance, but no visible signs of infection noted upon exam  Recommend x-ray to confirm presence of foreign body  Assessed tetanus vaccination status    FOREIGN BODY IN FOOT:  - Educated patient that foreign bodies in the body will typically work their way out if not recognized by the body.  - Royal to continue wearing shoes to protect feet.  - Ordered right foot x-ray.  - Recommend follow-up with Podiatry for further investigation of possible foreign body removal if symptoms persist.  - Will review x-ray results and send a note to patient via [employer].  - Follow up with Podiatry if symptoms persist/worsen.    TETANUS VACCINATION:  - Administered tetanus vaccine (Boostrix).    OTHER INSTRUCTIONS:  - Recommend taking showers instead of baths.          Follow up if symptoms worsen or fail to improve.

## 2024-10-03 ENCOUNTER — OFFICE VISIT (OUTPATIENT)
Dept: SPORTS MEDICINE | Facility: CLINIC | Age: 24
End: 2024-10-03
Payer: COMMERCIAL

## 2024-10-03 ENCOUNTER — HOSPITAL ENCOUNTER (OUTPATIENT)
Dept: RADIOLOGY | Facility: HOSPITAL | Age: 24
Discharge: HOME | End: 2024-10-03
Attending: ORTHOPAEDIC SURGERY
Payer: COMMERCIAL

## 2024-10-03 VITALS — WEIGHT: 240 LBS | BODY MASS INDEX: 30.8 KG/M2 | HEIGHT: 74 IN

## 2024-10-03 DIAGNOSIS — M25.562 LEFT KNEE PAIN, UNSPECIFIED CHRONICITY: ICD-10-CM

## 2024-10-03 DIAGNOSIS — M79.605 LEFT LEG PAIN: ICD-10-CM

## 2024-10-03 DIAGNOSIS — S83.282A ACUTE LATERAL MENISCUS TEAR OF LEFT KNEE, INITIAL ENCOUNTER: ICD-10-CM

## 2024-10-03 DIAGNOSIS — M25.562 ACUTE PAIN OF LEFT KNEE: Primary | ICD-10-CM

## 2024-10-03 PROCEDURE — 73562 X-RAY EXAM OF KNEE 3: CPT | Mod: TC,PN,RT

## 2024-10-03 PROCEDURE — 99999 PR PBB SHADOW E&M-EST. PATIENT-LVL III: CPT | Mod: PBBFAC,,, | Performed by: ORTHOPAEDIC SURGERY

## 2024-10-03 PROCEDURE — 99213 OFFICE O/P EST LOW 20 MIN: CPT | Mod: S$GLB,,, | Performed by: ORTHOPAEDIC SURGERY

## 2024-10-03 PROCEDURE — 73552 X-RAY EXAM OF FEMUR 2/>: CPT | Mod: TC,PN,LT

## 2024-10-03 PROCEDURE — 3008F BODY MASS INDEX DOCD: CPT | Mod: CPTII,S$GLB,, | Performed by: ORTHOPAEDIC SURGERY

## 2024-10-03 PROCEDURE — 73564 X-RAY EXAM KNEE 4 OR MORE: CPT | Mod: TC,PN,LT

## 2024-10-03 PROCEDURE — 73590 X-RAY EXAM OF LOWER LEG: CPT | Mod: TC,PN,LT

## 2024-10-03 NOTE — PROGRESS NOTES
Patient ID: Royal Martinez II  YOB: 2000  MRN: [id number]    Chief Complaint: Pain of the Left Knee, Pain of the Left Lower Leg, and Pain of the Left Thigh      Referred By: Zane    History of Present Illness: Royal Martinez II is a  24 y.o. 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 He reports a possible hyperextension injury on 9/25 while riding a skateboard. He reports his left foot slipped off the board and his left leg extended and slgihtly twisted. He reprots his pain is mostly lateral. He does reprot the knee pain extends into his hamstring and down the calf slghtly. He reports some lateral knee swelling. Of note: he had a previous right knee meniscus tear that he initially saw Dr. Torres in April of last year. He ended up having surgery with Dr. Emanuel. He underwent a Right knee arthroscopic combined inside-out and all inside lateral meniscus repair, chondroplasty (patella, lateral femoral condyle, lateral tibial plateau), limited notchplasty and plica excision on 6/13/23.     History of Present Illness  The patient presents for evaluation of left knee pain.    He is experiencing discomfort in his left knee, which he attributes to a fall that resulted in a scar. The pain extends to his calf and lower hamstring. He recalls an incident last Wednesday when he slipped off a skateboard, causing his knee to twist or hyperextend. He has a history of meniscus surgery on his right knee performed by Dr. Emanuel in June 2023. He denies any sensations of popping, numbness, or tingling in the knee.    HPI    Past Medical History:   Past Medical History:   Diagnosis Date    ALLERGIC RHINITIS     Allergic rhinitis 11/23/2020    Asthma, currently inactive     Childhood asthma 11/23/2020    Obesity     Overweight(278.02)     Patellar tendonitis of left knee       Past Surgical History:   Procedure Laterality Date    ARTHROSCOPIC CHONDROPLASTY OF KNEE JOINT Right 2023    Procedure: ARTHROSCOPY, KNEE, WITH CHONDROPLASTY;  Surgeon: ALEXANDER Emanuel MD;  Location: [facility] OR;  Service: Orthopedics;  Laterality: Right;    ARTHROSCOPY,KNEE,WITH MENISCUS REPAIR Right 2023    Procedure: ARTHROSCOPY KNEE ,WITH MENISCUS REPAIR;  Surgeon: ALEXANDER Emanuel MD;  Location: [facility] OR;  Service: Orthopedics;  Laterality: Right;  Regional w/Catheter, Adductor, 0.2% Ropivacaine    CIRCUMCISION       Family History   Problem Relation Name Age of Onset    Alcohol abuse Paternal Grandfather          now     Hypertension Father      Obesity Father      Hypertension Maternal Grandfather      Hypertension Mother      Breast cancer Mother  40    Diabetes Unknown          maternal side    Acne Maternal Aunt      Psoriasis Cousin      Diabetes Maternal Uncle      Depression Neg Hx      Suicidality Neg Hx      Eczema Neg Hx      Melanoma Neg Hx      Lupus Neg Hx       Social History     Socioeconomic History    Marital status: Single   Occupational History    Occupation: Student   Tobacco Use    Smoking status: Never    Smokeless tobacco: Never   Substance and Sexual Activity    Alcohol use: No    Drug use: Never    Sexual activity: Not Currently   Social History Narrative    Lives with parents and sib, no pets.    11th grade- [employer].                                  Social Drivers of Health     Financial Resource Strain: Low Risk  (3/5/2024)    Overall Financial Resource Strain (CARDIA)     Difficulty of Paying Living Expenses: Not hard at all   Food Insecurity: No Food Insecurity (3/5/2024)    Hunger Vital Sign     Worried About Running Out of Food in the Last Year: Never true     Ran Out of Food in the Last Year: Never true   Transportation Needs: No Transportation Needs (3/5/2024)    PRAPARE - Transportation     Lack of Transportation (Medical): No     Lack  of Transportation (Non-Medical): No   Physical Activity: Sufficiently Active (3/5/2024)    Exercise Vital Sign     Days of Exercise per Week: 5 days     Minutes of Exercise per Session: 120 min   Stress: No Stress Concern Present (3/5/2024)    [employer] [place] [employer] - Occupational Stress Questionnaire     Feeling of Stress : Only a little   Housing Stability: Low Risk  (3/5/2024)    Housing Stability Vital Sign     Unable to Pay for Housing in the Last Year: No     Number of Places Lived in the Last Year: 1     Unstable Housing in the Last Year: No     Medication List with Changes/Refills   Discontinued Medications    NEOMYCIN-POLYMYXIN-HYDROCORTISONE (CORTISPORIN) 3.5-10,000-1 MG/ML-UNIT/ML-% OTIC SUSPENSION    Place 3 drops into the left ear 3 (three) times daily.     Review of patient's allergies indicates:  No Known Allergies  ROS    Physical Exam:   Body mass index is 30.81 kg/m².  There were no vitals filed for this visit.   GENERAL: Well appearing, appropriate for stated age, no acute distress.  CARDIOVASCULAR: Pulses regular by peripheral palpation.  PULMONARY: Respirations are even and non-labored.  NEURO: Awake, alert, and oriented x 3.  PSYCH: Mood & affect are appropriate.  HEENT: Head is normocephalic and atraumatic.              Left Knee Exam     Inspection   Swelling: present    Tenderness   The patient tender to palpation of the lateral joint line, LCL and iliotibial band (hamstring laterlly).    Crepitus   The patient has crepitus of the LFC.    Range of Motion   Extension:  -5   Flexion:  130     Tests   Meniscus   Imelda:   Lateral - positive  Stability   Lachman: normal (-1 to 2mm)   MCL - Valgus: normal (0 to 2mm)  LCL - Varus: normal (0 to 2mm)    Other   Sensation: normal    Comments:  Intact EHL, FHL, gastrocsoleus, and tibialis anterior. Sensation intact to light touch in superficial peroneal, deep peroneal, tibial, sural, and saphenous nerve distributions. Foot warm and  well perfused with capillary refill of less than 2 seconds and palpable pedal pulses.      Muscle Strength   Left Lower Extremity   Hip Abduction: 5/5   Quadriceps:  5/5   Hamstrin/5     Vascular Exam       Left Pulses  Dorsalis Pedis:      2+  Posterior Tibial:      2+          Physical Exam  Left knee hyperextends to about 10 to 12 degrees. Right knee does not hyperextend. Imelda's test on the left knee reveals a little crunching.      Imaging:    MRI Knee Without Contrast Left  Narrative: EXAM:  MRI KNEE WITHOUT CONTRAST LEFT    CLINICAL INDICATION: Pain in left knee.    TECHNIQUE: MR left knee performed with multiplanar multisequence imaging.    COMPARISON STUDY:  None.    FINDINGS: There is no internal derangement with preservation of signal intensity and morphology of the menisci, collateral ligaments, and cruciate ligaments.    There is proximal patellar tendinosis with increased signal and fusiform thickening with partial-thickness tear.  No associated soft tissue swelling.  The extensor mechanism is otherwise unremarkable.  Normally positioned patella.  There is no knee joint effusion.    The chondral surfaces well preserved.    The bone marrow signal intensity is unremarkable.  Impression: 1.  Jumper's knee with proximal patellar tendinosis, partial-thickness tear.  2.  Negative for internal derangement.    Finalized on: 10/11/2024 8:10 AM By:  Tariq Power MD  [facility]# [id number]      2024-10-11 08:12:[id number]    [facility]      Results      Relevant imaging results reviewed and interpreted by me, discussed with the patient and / or family today.     Other Tests:         Assessment & Plan  1. Left knee pain.  The patient reports left knee pain following a hyperextension injury while skateboarding last Wednesday. He describes the pain as being located in the upper calf and lower hamstring area. On examination, the left knee hyperextends to about 10 to 12 degrees, unlike the right knee, which does not hyperextend.  There is a suspicion of a Baker's cyst in the back of the left knee. Given his history of a lateral meniscus tear in the right knee and the significant hyperextension of his left knee, an MRI of the left knee will be ordered to rule out any meniscus tear or other structural damage. The Imelda's test on the left knee indicated some crunching, which could be indicative of a meniscus issue. He is advised to rest and apply ice to the affected area.    Follow-up  Return after the MRI for follow-up.    Patient Instructions   Assessment:  Royal Martinez II is a  24 y.o. 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    Acute pain of left knee Yes    Left leg pain     Acute lateral meniscus tear of left knee, initial encounter         Plan:  MRI of left knee to evaluate of the lateral meniscus     Follow-up:  AFTER IMAGING  or sooner if there are any problems between now and then.    Leave Review:   Google: Leave Google Review  Healthgrades: Leave Healthgrades Review    After Hours Number: [phone]         Provider Note/Medical Decision Making:       I discussed worrisome and red flag signs and symptoms with the patient. The patient expressed understanding and agreed to alert me immediately or to go to the emergency room if they experience any of these.   Treatment plan was developed with input from the patient/family, and they expressed understanding and agreement with the plan. All questions were answered today.          Brian Torres MD  Orthopaedic Surgery & Sports Medicine       Disclaimer: This note was prepared using a voice recognition system and is likely to have sound alike errors within the text.     This note was generated with the assistance of ambient listening  technology. Verbal consent was obtained by the patient and accompanying visitor(s) for the recording of patient appointment to facilitate this note. I attest to having reviewed and edited the generated note for accuracy, though some syntax or spelling errors may persist. Please contact the author of this note for any clarification.    I, Ruth Campbell, acted as a scribe for Brian Torres MD for the duration of this office visit.

## 2024-10-03 NOTE — PATIENT INSTRUCTIONS
Assessment:  Royal Martinez II is a  24 y.o. 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    Acute pain of left knee Yes    Left leg pain     Acute lateral meniscus tear of left knee, initial encounter         Plan:  MRI of left knee to evaluate of the lateral meniscus     Follow-up:  AFTER IMAGING  or sooner if there are any problems between now and then.    Leave Review:   Google: Leave Google Review  Healthgrades: Leave Healthgrades Review    After Hours Number: [phone]

## 2024-10-10 ENCOUNTER — HOSPITAL ENCOUNTER (OUTPATIENT)
Dept: RADIOLOGY | Facility: HOSPITAL | Age: 24
Discharge: HOME | End: 2024-10-10
Attending: ORTHOPAEDIC SURGERY
Payer: COMMERCIAL

## 2024-10-10 DIAGNOSIS — M25.562 ACUTE PAIN OF LEFT KNEE: ICD-10-CM

## 2024-10-10 PROCEDURE — 73721 MRI JNT OF LWR EXTRE W/O DYE: CPT | Mod: TC,LT

## 2024-10-10 PROCEDURE — 73721 MRI JNT OF LWR EXTRE W/O DYE: CPT | Mod: 26,LT,, | Performed by: RADIOLOGY

## 2024-10-18 ENCOUNTER — IMMUNIZATION (OUTPATIENT)
Dept: INTERNAL MEDICINE | Facility: CLINIC | Age: 24
End: 2024-10-18
Payer: COMMERCIAL

## 2024-10-18 DIAGNOSIS — Z23 NEED FOR VACCINATION: Primary | ICD-10-CM

## 2024-10-25 ENCOUNTER — OFFICE VISIT (OUTPATIENT)
Dept: SPORTS MEDICINE | Facility: CLINIC | Age: 24
End: 2024-10-25
Payer: COMMERCIAL

## 2024-10-25 VITALS — BODY MASS INDEX: 30.81 KG/M2 | HEIGHT: 74 IN | WEIGHT: 240.06 LBS

## 2024-10-25 DIAGNOSIS — M76.52 JUMPER'S KNEE OF LEFT SIDE: Primary | ICD-10-CM

## 2024-10-25 DIAGNOSIS — S89.82XD HYPEREXTENSION INJURY OF LEFT KNEE, SUBSEQUENT ENCOUNTER: ICD-10-CM

## 2024-10-25 PROCEDURE — 1159F MED LIST DOCD IN RCRD: CPT | Mod: CPTII,95,, | Performed by: ORTHOPAEDIC SURGERY

## 2024-10-25 PROCEDURE — 3008F BODY MASS INDEX DOCD: CPT | Mod: CPTII,95,, | Performed by: ORTHOPAEDIC SURGERY

## 2024-10-25 PROCEDURE — 99213 OFFICE O/P EST LOW 20 MIN: CPT | Mod: 95,,, | Performed by: ORTHOPAEDIC SURGERY

## 2024-12-02 ENCOUNTER — OFFICE VISIT (OUTPATIENT)
Dept: OPHTHALMOLOGY | Facility: CLINIC | Age: 24
End: 2024-12-02
Payer: COMMERCIAL

## 2024-12-02 DIAGNOSIS — H33.323 RETINAL HOLE OF BOTH EYES: Primary | ICD-10-CM

## 2024-12-02 DIAGNOSIS — H52.223 REGULAR ASTIGMATISM OF BOTH EYES: ICD-10-CM

## 2024-12-02 PROCEDURE — 99999 PR PBB SHADOW E&M-EST. PATIENT-LVL II: CPT | Mod: PBBFAC,,, | Performed by: OPTOMETRIST

## 2024-12-02 PROCEDURE — 99204 OFFICE O/P NEW MOD 45 MIN: CPT | Mod: S$GLB,,, | Performed by: OPTOMETRIST

## 2024-12-02 PROCEDURE — 1159F MED LIST DOCD IN RCRD: CPT | Mod: CPTII,S$GLB,, | Performed by: OPTOMETRIST

## 2024-12-02 PROCEDURE — 92015 DETERMINE REFRACTIVE STATE: CPT | Mod: S$GLB,,, | Performed by: OPTOMETRIST

## 2024-12-02 NOTE — PROGRESS NOTES
HPI     Annual Exam            Comments: NP to DKT  Patient here today for yearly eye exam          Comments    Vision changes since last eye exam?: None noticed  No correction    Any eye pain today: No    Other ocular symptoms: No    Interested in contact lens fitting today? No                      Last edited by Deisi Jackson, PCT on 12/2/2024  8:18 AM.            Assessment /Plan     For exam results, see Encounter Report.    1. Retinal hole of both eyes   Incidental finding on exam today  RD precautions Consult Dr MCKEON for eval to determine if barricade laser indicated.       2. Regular astigmatism of both eyes  Eyeglass Final Rx       Eyeglass Final Rx         Sphere Cylinder Axis    Right [place] +0.75 088    Left +0.25 +0.75 105      Type: SVL    Expiration Date: 12/2/2025                     RTC with Dr. Samayoa for retina eval, sooner if changes to vision.   Discussed above and answered questions.

## 2024-12-20 ENCOUNTER — OFFICE VISIT (OUTPATIENT)
Dept: OPHTHALMOLOGY | Facility: CLINIC | Age: 24
End: 2024-12-20
Payer: COMMERCIAL

## 2024-12-20 DIAGNOSIS — H33.323 RETINAL HOLE OF BOTH EYES: ICD-10-CM

## 2024-12-20 DIAGNOSIS — H35.413 BILATERAL RETINAL LATTICE DEGENERATION: Primary | ICD-10-CM

## 2024-12-20 PROCEDURE — 99999 PR PBB SHADOW E&M-EST. PATIENT-LVL III: CPT | Mod: PBBFAC,,, | Performed by: OPHTHALMOLOGY

## 2024-12-20 NOTE — PROGRESS NOTES
===============================  Date today is 12/20/2024  Royal Martinez II is a 24 y.o. male  Last visit [facility]: :Visit date not found   Last visit eye dept. 12/2/2024    Uncorrected distance visual acuity was 20/20 in the right eye and 20/30 in the left eye.  Tonometry       Tonometry ([facility], 9:30 AM)         Right Left    Pressure 19 18                  Not recorded       Not recorded       Not recorded       Chief Complaint   Patient presents with    Follow-up     retinal hole 1-2w       HPI     Follow-up            Comments: retinal hole 1-2w            Comments    States that his vision is stable and denies any new ocular issues at this   time.          Last edited by Allison Arechiga on 12/20/2024  9:30 AM.      Problem List Items Addressed This Visit    None  Visit Diagnoses       Bilateral retinal lattice degeneration    -  Primary    Relevant Orders    Posterior Segment OCT Retina-Both eyes (Completed)    Retinal hole of both eyes        Relevant Orders    Posterior Segment OCT Retina-Both eyes (Completed)          Instructed to call 24/7 for any worsening of vision, visual distortion or pain.  Check OU independently daily.    Gave my office and personal cell phone number.  ________________  12/20/2024 today  Royal Martinez II    OU retinal hole  OCT ok  Clear lens OU  Macula ok  Bilateral lattice  Multiple flat pigmented atrophic holes OU  No tx needed- ok to follow  Discussed s/s retina tear/detachment  Discussed increased risk of RD, 1 in 5000  Ok to follow yearly with Dr. Worley    RTC 1 year with Dr. Worley  Instructed to call 24/7 for any worsening of vision or symptoms. Check OU daily.   Gave my office and cell phone number.    =============================

## 2025-06-18 ENCOUNTER — PATIENT MESSAGE (OUTPATIENT)
Dept: RESEARCH | Facility: HOSPITAL | Age: 25
End: 2025-06-18
Payer: COMMERCIAL

## (undated) DEVICE — COVER PROXIMA MAYO STAND

## (undated) DEVICE — UNDERGLOVES BIOGEL PI SIZE 8.5

## (undated) DEVICE — NDL SUTTAPE MINI MENIS REP 2-0

## (undated) DEVICE — DRAPE STERI INSTRUMENT 1018

## (undated) DEVICE — Device

## (undated) DEVICE — ELECTRODE REM PLYHSV RETURN 9

## (undated) DEVICE — DRAPE TOP 53X102IN

## (undated) DEVICE — GAUZE SPONGE 4X4 12PLY

## (undated) DEVICE — BANDAGE ESMARK ELASTIC ST 6X9

## (undated) DEVICE — GOWN SMART IMP BREATHABLE XXLG

## (undated) DEVICE — PROBE ARTHSCP EDGE ENERGY 50

## (undated) DEVICE — DRAPE INCISE IOBAN 2 23X17IN

## (undated) DEVICE — PAD ABDOMINAL STERILE 8X10IN

## (undated) DEVICE — TUBING SUC UNIV W/CONN 12FT

## (undated) DEVICE — SUT ETHICON 3-0 BLK MONO PS

## (undated) DEVICE — DRESSING XEROFORM NONADH 1X8IN

## (undated) DEVICE — APPLICATOR CHLORAPREP ORN 26ML

## (undated) DEVICE — DRAPE ARTHSCP T ORTHOMAX POUCH

## (undated) DEVICE — GLOVE BIOGEL SKINSENSE PI 8.0

## (undated) DEVICE — BANDAGE MATRIX HK LOOP 6IN 5YD

## (undated) DEVICE — SYS CLSR DERMABOND PRINEO 22CM

## (undated) DEVICE — COVER LIGHT HANDLE 80/CA

## (undated) DEVICE — BLADE SHAVER LANZA 4.2X13CM

## (undated) DEVICE — YANKAUER OPEN TIP W/O VENT

## (undated) DEVICE — PUSHER CUTTER KNOT FAST FX CUR

## (undated) DEVICE — TUBE SET INFLOW/OUTFLOW

## (undated) DEVICE — WRAP KNEE ACCU THERM GEL PACK

## (undated) DEVICE — PENCIL ROCKER SWITCH 10FT CORD

## (undated) DEVICE — PUSHER CUTTER KNOT FAST FX STR

## (undated) DEVICE — SOL NACL IRR 3000ML

## (undated) DEVICE — COVER CAMERA OPERATING ROOM

## (undated) DEVICE — SUT CTD VICRYL 0 UND BR

## (undated) DEVICE — TOURNIQUET SB QC DP 34X4IN

## (undated) DEVICE — SYR 30CC LUER LOCK

## (undated) DEVICE — NDL 18GA X1 1/2 REG BEVEL

## (undated) DEVICE — SUT VICRYL 3-0 27 SH